# Patient Record
Sex: FEMALE | Race: WHITE | NOT HISPANIC OR LATINO | Employment: OTHER | URBAN - NONMETROPOLITAN AREA
[De-identification: names, ages, dates, MRNs, and addresses within clinical notes are randomized per-mention and may not be internally consistent; named-entity substitution may affect disease eponyms.]

---

## 2023-02-10 RX ORDER — ASPIRIN 325 MG
1 TABLET, DELAYED RELEASE (ENTERIC COATED) ORAL DAILY
COMMUNITY

## 2023-02-10 RX ORDER — IBANDRONATE SODIUM 150 MG/1
TABLET, FILM COATED ORAL
COMMUNITY
Start: 2022-06-21 | End: 2023-05-02 | Stop reason: SDUPTHER

## 2023-02-10 RX ORDER — MEMANTINE HYDROCHLORIDE 10 MG/1
1 TABLET ORAL 2 TIMES DAILY
COMMUNITY
Start: 2021-05-03 | End: 2023-09-13 | Stop reason: SDUPTHER

## 2023-02-10 RX ORDER — ASCORBIC ACID 500 MG
1 TABLET ORAL DAILY
COMMUNITY
Start: 2020-05-21

## 2023-02-10 RX ORDER — MUPIROCIN 20 MG/G
OINTMENT TOPICAL 2 TIMES DAILY
COMMUNITY
Start: 2022-08-26 | End: 2023-05-02 | Stop reason: ALTCHOICE

## 2023-02-18 PROBLEM — L97.912: Status: ACTIVE | Noted: 2023-02-18

## 2023-02-18 PROBLEM — S32.2XXA FX SACRUM/COCCYX-CLOSED (MULTI): Status: ACTIVE | Noted: 2023-02-18

## 2023-02-18 PROBLEM — M53.3 SACRAL PAIN: Status: ACTIVE | Noted: 2023-02-18

## 2023-02-18 PROBLEM — F01.50 VASCULAR DEMENTIA WITHOUT BEHAVIORAL DISTURBANCE (MULTI): Status: ACTIVE | Noted: 2023-02-18

## 2023-02-18 PROBLEM — M40.00 KYPHOSIS (ACQUIRED) (POSTURAL): Status: ACTIVE | Noted: 2023-02-18

## 2023-02-18 PROBLEM — S32.10XA FX SACRUM/COCCYX-CLOSED (MULTI): Status: ACTIVE | Noted: 2023-02-18

## 2023-02-18 PROBLEM — M85.851 OSTEOPENIA OF RIGHT HIP: Status: ACTIVE | Noted: 2023-02-18

## 2023-02-18 PROBLEM — N89.8 VAGINAL DRYNESS: Status: ACTIVE | Noted: 2023-02-18

## 2023-02-18 PROBLEM — M80.00XA AGE-RELATED OSTEOPOROSIS WITH CURRENT PATHOLOGICAL FRACTURE: Status: ACTIVE | Noted: 2023-02-18

## 2023-05-02 ENCOUNTER — OFFICE VISIT (OUTPATIENT)
Dept: PRIMARY CARE | Facility: CLINIC | Age: 83
End: 2023-05-02
Payer: MEDICARE

## 2023-05-02 VITALS
WEIGHT: 111 LBS | BODY MASS INDEX: 22.38 KG/M2 | HEIGHT: 59 IN | SYSTOLIC BLOOD PRESSURE: 126 MMHG | DIASTOLIC BLOOD PRESSURE: 64 MMHG | OXYGEN SATURATION: 98 % | HEART RATE: 82 BPM

## 2023-05-02 DIAGNOSIS — S32.2XXD CLOSED FRACTURE OF SACRUM AND COCCYX WITH ROUTINE HEALING, SUBSEQUENT ENCOUNTER: ICD-10-CM

## 2023-05-02 DIAGNOSIS — Z00.00 ROUTINE GENERAL MEDICAL EXAMINATION AT HEALTH CARE FACILITY: Primary | ICD-10-CM

## 2023-05-02 DIAGNOSIS — M80.00XD AGE-RELATED OSTEOPOROSIS WITH CURRENT PATHOLOGICAL FRACTURE WITH ROUTINE HEALING, SUBSEQUENT ENCOUNTER: ICD-10-CM

## 2023-05-02 DIAGNOSIS — S32.10XD CLOSED FRACTURE OF SACRUM AND COCCYX WITH ROUTINE HEALING, SUBSEQUENT ENCOUNTER: ICD-10-CM

## 2023-05-02 DIAGNOSIS — F01.50 VASCULAR DEMENTIA WITHOUT BEHAVIORAL DISTURBANCE (MULTI): ICD-10-CM

## 2023-05-02 PROBLEM — M53.3 SACRAL PAIN: Status: RESOLVED | Noted: 2023-02-18 | Resolved: 2023-05-02

## 2023-05-02 PROBLEM — L97.912: Status: RESOLVED | Noted: 2023-02-18 | Resolved: 2023-05-02

## 2023-05-02 PROBLEM — M85.851 OSTEOPENIA OF RIGHT HIP: Status: RESOLVED | Noted: 2023-02-18 | Resolved: 2023-05-02

## 2023-05-02 PROCEDURE — 1160F RVW MEDS BY RX/DR IN RCRD: CPT | Performed by: FAMILY MEDICINE

## 2023-05-02 PROCEDURE — 99214 OFFICE O/P EST MOD 30 MIN: CPT | Performed by: FAMILY MEDICINE

## 2023-05-02 PROCEDURE — 1170F FXNL STATUS ASSESSED: CPT | Performed by: FAMILY MEDICINE

## 2023-05-02 PROCEDURE — 1159F MED LIST DOCD IN RCRD: CPT | Performed by: FAMILY MEDICINE

## 2023-05-02 PROCEDURE — 1036F TOBACCO NON-USER: CPT | Performed by: FAMILY MEDICINE

## 2023-05-02 PROCEDURE — G0439 PPPS, SUBSEQ VISIT: HCPCS | Performed by: FAMILY MEDICINE

## 2023-05-02 RX ORDER — SALIVA STIMULANT COMB. NO.4
1 SPRAY, NON-AEROSOL (ML) MUCOUS MEMBRANE DAILY
COMMUNITY
End: 2024-05-15 | Stop reason: ALTCHOICE

## 2023-05-02 RX ORDER — IBANDRONATE SODIUM 150 MG/1
150 TABLET, FILM COATED ORAL
Qty: 3 TABLET | Refills: 3 | Status: SHIPPED | OUTPATIENT
Start: 2023-05-02 | End: 2024-05-15 | Stop reason: SDUPTHER

## 2023-05-02 ASSESSMENT — ACTIVITIES OF DAILY LIVING (ADL)
MANAGING_FINANCES: TOTAL CARE
GROCERY_SHOPPING: NEEDS ASSISTANCE
BATHING: INDEPENDENT
DRESSING: INDEPENDENT
TAKING_MEDICATION: NEEDS ASSISTANCE
DOING_HOUSEWORK: INDEPENDENT

## 2023-05-02 ASSESSMENT — PATIENT HEALTH QUESTIONNAIRE - PHQ9
SUM OF ALL RESPONSES TO PHQ9 QUESTIONS 1 AND 2: 0
1. LITTLE INTEREST OR PLEASURE IN DOING THINGS: NOT AT ALL
1. LITTLE INTEREST OR PLEASURE IN DOING THINGS: NOT AT ALL
2. FEELING DOWN, DEPRESSED OR HOPELESS: NOT AT ALL
2. FEELING DOWN, DEPRESSED OR HOPELESS: NOT AT ALL
SUM OF ALL RESPONSES TO PHQ9 QUESTIONS 1 AND 2: 0

## 2023-05-02 NOTE — PATIENT INSTRUCTIONS

## 2023-05-02 NOTE — PROGRESS NOTES
"Subjective   Reason for Visit: Dinah Parmar is an 82 y.o. female here for a Medicare Wellness visit.     Past Medical, Surgical, and Family History reviewed and updated in chart.    Reviewed all medications by prescribing practitioner or clinical pharmacist (such as prescriptions, OTCs, herbal therapies and supplements) and documented in the medical record.    HPI  She had a lot of pain in right groin. When walking or trying to get up. Walking. CT shows Sacral fracture as well as pubic ramus and L5 transverse process. This was 12  months ago. Has had  no falls since then. Had [facility] for PT. Tylenol for pain. She saw Dr Grossman and observed. . She is Active and pain is less      Memory seems to be about the same as it has been. 6 CIT score of 23 Cannot accurately recall the associations of nieces and nephews and now . Cannot get to places. Will not recognize places she been. Nees reminded of appointments. Re-asks things she has been told. No wandering. Does cooking still but only routine things.  Now burner issues. But cannot recall recipes or follow them. Memantine at 10 bid.  feels she is doing well at times.      Osteoporosis. Noted on Xray. DEXA Elizabeth 15, 2022. Could not afford the Prolia so on Boniva. T -3.9 at hip.      Weight is stable at BMI 22    DPA and LW  and son  Pneumovax UTD          Patient Care Team:  Abdiel Gutierrez MD as PCP - General  Abdiel Gutierrez MD as PCP - O Medicare Advantage PCP     Review of Systems    Objective   Vitals:  /64 (BP Location: Left arm, Patient Position: Sitting)   Pulse 82   Ht 1.499 m (4' 11\")   Wt 50.3 kg (111 lb)   SpO2 98%   BMI 22.42 kg/m²       Physical Exam  Vitals reviewed.   Constitutional:       General: She is not in acute distress.     Appearance: Normal appearance.      Comments:  supplies most of history and corrects her history. She is able to tell current condition.    HENT:      Head: Normocephalic.      Right Ear: Tympanic " membrane, ear canal and external ear normal. No decreased hearing noted.      Left Ear: Tympanic membrane, ear canal and external ear normal. No decreased hearing noted.      Nose: Nose normal.      Mouth/Throat:      Pharynx: Oropharynx is clear.   Eyes:      Extraocular Movements: Extraocular movements intact.      Conjunctiva/sclera: Conjunctivae normal.      Pupils: Pupils are equal, round, and reactive to light.   Neck:      Vascular: No carotid bruit.   Cardiovascular:      Rate and Rhythm: Normal rate and regular rhythm.      Pulses: Normal pulses.      Heart sounds: Normal heart sounds. No murmur heard.  Pulmonary:      Effort: Pulmonary effort is normal. No respiratory distress.      Breath sounds: Normal breath sounds.   Abdominal:      General: Abdomen is flat. Bowel sounds are normal. There is no distension.      Palpations: Abdomen is soft. There is no mass.      Tenderness: There is no abdominal tenderness.   Musculoskeletal:      Cervical back: Normal range of motion and neck supple. No tenderness.   Lymphadenopathy:      Cervical: No cervical adenopathy.   Skin:     General: Skin is warm and dry.      Findings: No rash.   Neurological:      General: No focal deficit present.      Mental Status: She is alert.   Psychiatric:         Mood and Affect: Mood normal.         Thought Content: Thought content normal.         Judgment: Judgment normal.         Assessment/Plan   Problem List Items Addressed This Visit       Age-related osteoporosis with current pathological fracture    Fx sacrum/coccyx-closed (CMS/HCC)    Vascular dementia without behavioral disturbance (CMS/HCC)     Other Visit Diagnoses       Routine general medical examination at health care facility    -  Primary             Patient was identified as a fall risk. Risk prevention instructions provided. She fell a few months ago and broke pelvis. She got tangled up in clothes. She is more careful dressing now but has the memory deficit. Bu  this was over a year ago. No repeats.

## 2023-09-13 ENCOUNTER — TELEPHONE (OUTPATIENT)
Dept: PRIMARY CARE | Facility: CLINIC | Age: 83
End: 2023-09-13
Payer: MEDICARE

## 2023-09-13 DIAGNOSIS — F01.50 VASCULAR DEMENTIA WITHOUT BEHAVIORAL DISTURBANCE (MULTI): Primary | ICD-10-CM

## 2023-09-13 RX ORDER — MEMANTINE HYDROCHLORIDE 10 MG/1
10 TABLET ORAL 2 TIMES DAILY
Qty: 60 TABLET | Refills: 2 | Status: SHIPPED | OUTPATIENT
Start: 2023-09-13 | End: 2023-11-14

## 2023-09-21 ENCOUNTER — CLINICAL SUPPORT (OUTPATIENT)
Dept: PRIMARY CARE | Facility: CLINIC | Age: 83
End: 2023-09-21
Payer: MEDICARE

## 2023-09-21 DIAGNOSIS — Z23 IMMUNIZATION DUE: ICD-10-CM

## 2023-09-21 PROCEDURE — G0008 ADMIN INFLUENZA VIRUS VAC: HCPCS | Performed by: FAMILY MEDICINE

## 2023-09-21 PROCEDURE — 90662 IIV NO PRSV INCREASED AG IM: CPT | Performed by: FAMILY MEDICINE

## 2023-11-02 ENCOUNTER — APPOINTMENT (OUTPATIENT)
Dept: PRIMARY CARE | Facility: CLINIC | Age: 83
End: 2023-11-02
Payer: MEDICARE

## 2023-11-14 ENCOUNTER — LAB (OUTPATIENT)
Dept: LAB | Facility: LAB | Age: 83
End: 2023-11-14
Payer: MEDICARE

## 2023-11-14 ENCOUNTER — OFFICE VISIT (OUTPATIENT)
Dept: PRIMARY CARE | Facility: CLINIC | Age: 83
End: 2023-11-14
Payer: MEDICARE

## 2023-11-14 VITALS
SYSTOLIC BLOOD PRESSURE: 126 MMHG | BODY MASS INDEX: 21.81 KG/M2 | WEIGHT: 108 LBS | HEART RATE: 73 BPM | DIASTOLIC BLOOD PRESSURE: 74 MMHG | OXYGEN SATURATION: 99 %

## 2023-11-14 DIAGNOSIS — M80.00XD AGE-RELATED OSTEOPOROSIS WITH CURRENT PATHOLOGICAL FRACTURE WITH ROUTINE HEALING, SUBSEQUENT ENCOUNTER: Primary | ICD-10-CM

## 2023-11-14 DIAGNOSIS — M47.22 CERVICAL SPONDYLOSIS WITH RADICULOPATHY: ICD-10-CM

## 2023-11-14 DIAGNOSIS — M40.00 KYPHOSIS (ACQUIRED) (POSTURAL): ICD-10-CM

## 2023-11-14 DIAGNOSIS — F01.50 VASCULAR DEMENTIA WITHOUT BEHAVIORAL DISTURBANCE (MULTI): ICD-10-CM

## 2023-11-14 LAB
ALBUMIN SERPL BCP-MCNC: 4.5 G/DL (ref 3.4–5)
ALP SERPL-CCNC: 72 U/L (ref 33–136)
ALT SERPL W P-5'-P-CCNC: 13 U/L (ref 7–45)
ANION GAP SERPL CALC-SCNC: 9 MMOL/L (ref 10–20)
AST SERPL W P-5'-P-CCNC: 25 U/L (ref 9–39)
BILIRUB SERPL-MCNC: 0.9 MG/DL (ref 0–1.2)
BUN SERPL-MCNC: 15 MG/DL (ref 6–23)
CALCIUM SERPL-MCNC: 10.2 MG/DL (ref 8.6–10.3)
CHLORIDE SERPL-SCNC: 100 MMOL/L (ref 98–107)
CO2 SERPL-SCNC: 30 MMOL/L (ref 21–32)
CREAT SERPL-MCNC: 0.75 MG/DL (ref 0.5–1.05)
ERYTHROCYTE [DISTWIDTH] IN BLOOD BY AUTOMATED COUNT: 12.5 % (ref 11.5–14.5)
GFR SERPL CREATININE-BSD FRML MDRD: 78 ML/MIN/1.73M*2
GLUCOSE SERPL-MCNC: 83 MG/DL (ref 74–99)
HCT VFR BLD AUTO: 41.4 % (ref 36–46)
HGB BLD-MCNC: 13.3 G/DL (ref 12–16)
MCH RBC QN AUTO: 32 PG (ref 26–34)
MCHC RBC AUTO-ENTMCNC: 32.1 G/DL (ref 32–36)
MCV RBC AUTO: 100 FL (ref 80–100)
NRBC BLD-RTO: 0 /100 WBCS (ref 0–0)
PLATELET # BLD AUTO: 372 X10*3/UL (ref 150–450)
POTASSIUM SERPL-SCNC: 3.8 MMOL/L (ref 3.5–5.3)
PROT SERPL-MCNC: 7.4 G/DL (ref 6.4–8.2)
RBC # BLD AUTO: 4.16 X10*6/UL (ref 4–5.2)
SODIUM SERPL-SCNC: 135 MMOL/L (ref 136–145)
WBC # BLD AUTO: 8.8 X10*3/UL (ref 4.4–11.3)

## 2023-11-14 PROCEDURE — 1036F TOBACCO NON-USER: CPT | Performed by: FAMILY MEDICINE

## 2023-11-14 PROCEDURE — 80053 COMPREHEN METABOLIC PANEL: CPT

## 2023-11-14 PROCEDURE — 99214 OFFICE O/P EST MOD 30 MIN: CPT | Performed by: FAMILY MEDICINE

## 2023-11-14 PROCEDURE — 1159F MED LIST DOCD IN RCRD: CPT | Performed by: FAMILY MEDICINE

## 2023-11-14 PROCEDURE — 85027 COMPLETE CBC AUTOMATED: CPT

## 2023-11-14 PROCEDURE — 1160F RVW MEDS BY RX/DR IN RCRD: CPT | Performed by: FAMILY MEDICINE

## 2023-11-14 PROCEDURE — 36415 COLL VENOUS BLD VENIPUNCTURE: CPT

## 2023-11-14 RX ORDER — MEMANTINE HYDROCHLORIDE 28 MG/1
28 CAPSULE, EXTENDED RELEASE ORAL DAILY
Qty: 90 CAPSULE | Refills: 3 | Status: SHIPPED | OUTPATIENT
Start: 2023-11-14 | End: 2024-11-13

## 2023-11-14 NOTE — PROGRESS NOTES
Subjective   Patient ID: Dinah Parmar is a 83 y.o. female who presents for Med Management (Memory worse).    HPI   She had a lot of pain in right groin. When walking or trying to get up. Walking. CT shows Sacral fracture as well as pubic ramus and L5 transverse process. This was 12  months ago. Has had no falls since then. Had [facility] for PT. Tylenol for pain. She saw Dr Grossman and observed.  She is Active and pain is resolved but now shoulder pain more of an issue.      Memory seems to be worse.  Her  reports that she used [employer] for shortening. Also she insisted a little girl was in the house.  6 CIT score of 23 last time.  Did not recall that she was sleeping with .  Cannot accurately recall the associations of nieces and nephews and now . Cannot get to places. Will not recognize places she been. Needs reminded of appointments. Re-asks things she has been told. No wandering. No longer does cooking.   No burner issues. Cannot recall recipes or follow them. She was able to make coffee but now struggles.  Memantine at 10 bid. Will bump to 28 mg.   feels she is having less good times. Sleeping OK     Osteoporosis. Noted on Xray. DEXA Elizabeth 15, 2022. Could not afford the Prolia so on Boniva. T -3.9 at hip.      Weight is down 3 to 109 and  BMI 21.8     DPA and LW  and son  Pneumovax UTD     Review of Systems    Objective   /74 (BP Location: Left arm, Patient Position: Sitting)   Pulse 73   Wt 49 kg (108 lb)   SpO2 99%   BMI 21.81 kg/m²     Physical Exam  Vitals reviewed.   Constitutional:       Appearance: Normal appearance.   HENT:      Head: Normocephalic.   Eyes:      Extraocular Movements: Extraocular movements intact.      Conjunctiva/sclera: Conjunctivae normal.      Pupils: Pupils are equal, round, and reactive to light.   Neck:      Vascular: No carotid bruit.   Cardiovascular:      Rate and Rhythm: Normal rate and regular rhythm.      Heart sounds: Murmur (2/6 systolic)  heard.   Pulmonary:      Effort: Pulmonary effort is normal.      Breath sounds: Normal breath sounds.   Abdominal:      General: Abdomen is flat. Bowel sounds are normal.      Palpations: Abdomen is soft.   Musculoskeletal:      Cervical back: Normal range of motion and neck supple. Tenderness (left paracervical with spasm of left traps) present. No rigidity.   Lymphadenopathy:      Cervical: No cervical adenopathy.   Skin:     General: Skin is warm and dry.   Neurological:      Mental Status: She is alert and oriented to person, place, and time.   Psychiatric:         Mood and Affect: Mood normal.         Behavior: Behavior normal.         Thought Content: Thought content normal.         Judgment: Judgment normal.         Assessment/Plan   Problem List Items Addressed This Visit             ICD-10-CM    Age-related osteoporosis with current pathological fracture - Primary M80.00XA    Kyphosis (acquired) (postural) M40.00    Vascular dementia without behavioral disturbance (CMS/HCC) F01.50    Relevant Medications    memantine (Namenda) 28 mg capsule,sprinkle,ER 24hr    Other Relevant Orders    CBC    Comprehensive Metabolic Panel    Follow Up In Primary Care - Established     Other Visit Diagnoses         Codes    Cervical spondylosis with radiculopathy     M47.22

## 2023-11-14 NOTE — PATIENT INSTRUCTIONS
Try heating pad on neck for pain. Can try Jimbo Emu or something like that for the neck pain that you are feeling in the shoulder.

## 2023-11-16 ENCOUNTER — APPOINTMENT (OUTPATIENT)
Dept: PRIMARY CARE | Facility: CLINIC | Age: 83
End: 2023-11-16
Payer: MEDICARE

## 2024-05-15 ENCOUNTER — OFFICE VISIT (OUTPATIENT)
Dept: PRIMARY CARE | Facility: CLINIC | Age: 84
End: 2024-05-15
Payer: MEDICARE

## 2024-05-15 VITALS
SYSTOLIC BLOOD PRESSURE: 122 MMHG | OXYGEN SATURATION: 96 % | HEIGHT: 59 IN | BODY MASS INDEX: 21.97 KG/M2 | HEART RATE: 78 BPM | DIASTOLIC BLOOD PRESSURE: 60 MMHG | WEIGHT: 109 LBS

## 2024-05-15 DIAGNOSIS — N39.41 URGE INCONTINENCE OF URINE: ICD-10-CM

## 2024-05-15 DIAGNOSIS — Z00.00 ROUTINE GENERAL MEDICAL EXAMINATION AT HEALTH CARE FACILITY: Primary | ICD-10-CM

## 2024-05-15 DIAGNOSIS — M80.00XD AGE-RELATED OSTEOPOROSIS WITH CURRENT PATHOLOGICAL FRACTURE WITH ROUTINE HEALING, SUBSEQUENT ENCOUNTER: ICD-10-CM

## 2024-05-15 DIAGNOSIS — S61.411A SKIN TEAR OF RIGHT HAND WITHOUT COMPLICATION, INITIAL ENCOUNTER: ICD-10-CM

## 2024-05-15 DIAGNOSIS — F01.50 VASCULAR DEMENTIA WITHOUT BEHAVIORAL DISTURBANCE (MULTI): ICD-10-CM

## 2024-05-15 PROBLEM — M80.00XA AGE-RELATED OSTEOPOROSIS WITH CURRENT PATHOLOGICAL FRACTURE: Status: RESOLVED | Noted: 2023-02-18 | Resolved: 2024-05-15

## 2024-05-15 PROBLEM — S32.2XXA FX SACRUM/COCCYX-CLOSED (MULTI): Status: RESOLVED | Noted: 2023-02-18 | Resolved: 2024-05-15

## 2024-05-15 PROBLEM — M81.0 AGE-RELATED OSTEOPOROSIS WITHOUT CURRENT PATHOLOGICAL FRACTURE: Status: ACTIVE | Noted: 2024-05-15

## 2024-05-15 PROBLEM — S32.10XA FX SACRUM/COCCYX-CLOSED (MULTI): Status: RESOLVED | Noted: 2023-02-18 | Resolved: 2024-05-15

## 2024-05-15 PROCEDURE — 1123F ACP DISCUSS/DSCN MKR DOCD: CPT | Performed by: FAMILY MEDICINE

## 2024-05-15 PROCEDURE — 1170F FXNL STATUS ASSESSED: CPT | Performed by: FAMILY MEDICINE

## 2024-05-15 PROCEDURE — 1160F RVW MEDS BY RX/DR IN RCRD: CPT | Performed by: FAMILY MEDICINE

## 2024-05-15 PROCEDURE — G0439 PPPS, SUBSEQ VISIT: HCPCS | Performed by: FAMILY MEDICINE

## 2024-05-15 PROCEDURE — 1036F TOBACCO NON-USER: CPT | Performed by: FAMILY MEDICINE

## 2024-05-15 PROCEDURE — 1157F ADVNC CARE PLAN IN RCRD: CPT | Performed by: FAMILY MEDICINE

## 2024-05-15 PROCEDURE — 1159F MED LIST DOCD IN RCRD: CPT | Performed by: FAMILY MEDICINE

## 2024-05-15 PROCEDURE — 1158F ADVNC CARE PLAN TLK DOCD: CPT | Performed by: FAMILY MEDICINE

## 2024-05-15 RX ORDER — IBANDRONATE SODIUM 150 MG/1
150 TABLET, FILM COATED ORAL
Qty: 3 TABLET | Refills: 3 | Status: SHIPPED | OUTPATIENT
Start: 2024-05-15 | End: 2025-05-15

## 2024-05-15 RX ORDER — IBUPROFEN 200 MG
400 TABLET ORAL EVERY 6 HOURS PRN
COMMUNITY

## 2024-05-15 ASSESSMENT — ACTIVITIES OF DAILY LIVING (ADL)
BATHING: INDEPENDENT
MANAGING_FINANCES: TOTAL CARE
GROCERY_SHOPPING: TOTAL CARE
TAKING_MEDICATION: TOTAL CARE
DOING_HOUSEWORK: NEEDS ASSISTANCE
DRESSING: INDEPENDENT

## 2024-05-15 ASSESSMENT — ENCOUNTER SYMPTOMS
OCCASIONAL FEELINGS OF UNSTEADINESS: 0
LOSS OF SENSATION IN FEET: 0
DEPRESSION: 0

## 2024-05-15 ASSESSMENT — PATIENT HEALTH QUESTIONNAIRE - PHQ9
1. LITTLE INTEREST OR PLEASURE IN DOING THINGS: NOT AT ALL
2. FEELING DOWN, DEPRESSED OR HOPELESS: NOT AT ALL
2. FEELING DOWN, DEPRESSED OR HOPELESS: NOT AT ALL
1. LITTLE INTEREST OR PLEASURE IN DOING THINGS: NOT AT ALL
SUM OF ALL RESPONSES TO PHQ9 QUESTIONS 1 AND 2: 0
SUM OF ALL RESPONSES TO PHQ9 QUESTIONS 1 AND 2: 0

## 2024-05-15 NOTE — PROGRESS NOTES
Subjective   Reason for Visit: Dinah Parmar is an 83 y.o. female here for a Medicare Wellness visit.     Past Medical, Surgical, and Family History reviewed and updated in chart.    Reviewed all medications by prescribing practitioner or clinical pharmacist (such as prescriptions, OTCs, herbal therapies and supplements) and documented in the medical record.    HPI  She had a lot of pain in right groin. When walking or trying to get up. Walking. CT shows Sacral fracture as well as pubic ramus and L5 transverse process. This was 12  months ago. Has had no falls since then. Had [facility] for PT. Tylenol for pain. She saw Dr Grossman and observed.  She is Active and pain is resolved but now shoulder pain more of an issue. Ibuprofen takes care of that with normal CMP in November.      Memory continues to get worse.  She is still seeing animals and people in the house. 6 CIT score of 25 as she has difficulty following instructions, remembering what she is doing with sequences. .  Did not recall that she was sleeping with .  Cannot accurately recall the associations of nieces and nephews and now . Cannot get to places. Will not recognize places she has been.  Re-asks things she has been told. No wandering. No longer does cooking.   Now with burner issues so needs to take handles off.  Cannot recall recipes or follow them. She was able to make coffee but now struggles.  Memantine to ER 28 with little help.   feels she is having less good times. Sleeping OK.  Agitation not an issue. Not ready to consider memory care. Unit.  Needs to go with the things she is seeing.       Osteoporosis. Noted on Xray. DEXA Elizabeth 15, 2022. Could not afford the Prolia so on Boniva. T -3.9 at hip. Will forgo the DEXA this time.     Urge incontinence - continent most of the time but when she gets urge does not make it often. So wearing Depends       Weight is stable at 109 again.  BMI 21.8     DPA and LW No but would be  and  "son  Pneumovax UTD     Patient Care Team:  Abdiel Gutierrez MD as PCP - General  Abdiel Gutierrez MD as PCP - MMO Medicare Advantage PCP     Review of Systems    Objective   Vitals:  /60 (BP Location: Left arm, Patient Position: Sitting)   Pulse 78   Ht 1.505 m (4' 11.25\")   Wt 49.4 kg (109 lb)   SpO2 96%   BMI 21.83 kg/m²       Physical Exam  Vitals reviewed.   Constitutional:       General: She is not in acute distress.     Appearance: Normal appearance.      Comments: She repeats things. Denies issues that her  brings up.    HENT:      Head: Normocephalic.      Right Ear: Tympanic membrane, ear canal and external ear normal.      Left Ear: Tympanic membrane, ear canal and external ear normal.      Nose: Nose normal.      Mouth/Throat:      Mouth: Mucous membranes are moist.      Pharynx: Oropharynx is clear.   Eyes:      Extraocular Movements: Extraocular movements intact.      Conjunctiva/sclera: Conjunctivae normal.      Pupils: Pupils are equal, round, and reactive to light.   Neck:      Vascular: No carotid bruit.   Cardiovascular:      Rate and Rhythm: Normal rate and regular rhythm.      Pulses: Normal pulses.      Heart sounds: Normal heart sounds. No murmur heard.  Pulmonary:      Effort: Pulmonary effort is normal. No respiratory distress.      Breath sounds: Normal breath sounds.   Abdominal:      General: Abdomen is flat. Bowel sounds are normal. There is no distension.      Palpations: Abdomen is soft. There is no mass.      Tenderness: There is no abdominal tenderness.   Musculoskeletal:      Cervical back: Normal range of motion and neck supple. No tenderness.   Lymphadenopathy:      Cervical: No cervical adenopathy.   Skin:     General: Skin is warm and dry.      Findings: No rash.      Comments: Healing skin tear on palm of hand.    Neurological:      General: No focal deficit present.      Mental Status: She is alert. She is disoriented.   Psychiatric:         Mood and Affect: " Mood normal.         Thought Content: Thought content normal.      Comments: Good affect and no irritability         Assessment/Plan   Problem List Items Addressed This Visit       RESOLVED: Age-related osteoporosis with current pathological fracture    Relevant Medications    ibandronate (Boniva) 150 mg tablet    Urge incontinence of urine    Vascular dementia without behavioral disturbance (Multi)    Relevant Orders    CBC    Comprehensive Metabolic Panel    Follow Up In Primary Care - Established     Other Visit Diagnoses       Routine general medical examination at health care facility    -  Primary    Skin tear of right hand without complication, initial encounter

## 2024-08-17 ENCOUNTER — HOSPITAL ENCOUNTER (INPATIENT)
Facility: HOSPITAL | Age: 84
End: 2024-08-17
Attending: EMERGENCY MEDICINE | Admitting: INTERNAL MEDICINE
Payer: MEDICARE

## 2024-08-17 DIAGNOSIS — R53.1 GENERALIZED WEAKNESS: Primary | ICD-10-CM

## 2024-08-17 LAB
ALBUMIN SERPL BCP-MCNC: 4.1 G/DL (ref 3.4–5)
ALP SERPL-CCNC: 63 U/L (ref 33–136)
ALT SERPL W P-5'-P-CCNC: 8 U/L (ref 7–45)
ANION GAP SERPL CALC-SCNC: 11 MMOL/L (ref 10–20)
AST SERPL W P-5'-P-CCNC: 19 U/L (ref 9–39)
BASOPHILS # BLD AUTO: 0.04 X10*3/UL (ref 0–0.1)
BASOPHILS NFR BLD AUTO: 0.5 %
BILIRUB SERPL-MCNC: 1 MG/DL (ref 0–1.2)
BUN SERPL-MCNC: 16 MG/DL (ref 6–23)
CALCIUM SERPL-MCNC: 9.3 MG/DL (ref 8.6–10.3)
CHLORIDE SERPL-SCNC: 103 MMOL/L (ref 98–107)
CO2 SERPL-SCNC: 24 MMOL/L (ref 21–32)
CREAT SERPL-MCNC: 0.76 MG/DL (ref 0.5–1.05)
EGFRCR SERPLBLD CKD-EPI 2021: 77 ML/MIN/1.73M*2
EOSINOPHIL # BLD AUTO: 0.1 X10*3/UL (ref 0–0.4)
EOSINOPHIL NFR BLD AUTO: 1.3 %
ERYTHROCYTE [DISTWIDTH] IN BLOOD BY AUTOMATED COUNT: 12.2 % (ref 11.5–14.5)
GLUCOSE SERPL-MCNC: 95 MG/DL (ref 74–99)
HCT VFR BLD AUTO: 36.4 % (ref 36–46)
HGB BLD-MCNC: 11.9 G/DL (ref 12–16)
IMM GRANULOCYTES # BLD AUTO: 0.02 X10*3/UL (ref 0–0.5)
IMM GRANULOCYTES NFR BLD AUTO: 0.3 % (ref 0–0.9)
LACTATE SERPL-SCNC: 0.9 MMOL/L (ref 0.4–2)
LYMPHOCYTES # BLD AUTO: 0.95 X10*3/UL (ref 0.8–3)
LYMPHOCYTES NFR BLD AUTO: 11.9 %
MAGNESIUM SERPL-MCNC: 1.92 MG/DL (ref 1.6–2.4)
MCH RBC QN AUTO: 31.4 PG (ref 26–34)
MCHC RBC AUTO-ENTMCNC: 32.7 G/DL (ref 32–36)
MCV RBC AUTO: 96 FL (ref 80–100)
MONOCYTES # BLD AUTO: 0.92 X10*3/UL (ref 0.05–0.8)
MONOCYTES NFR BLD AUTO: 11.5 %
NEUTROPHILS # BLD AUTO: 5.97 X10*3/UL (ref 1.6–5.5)
NEUTROPHILS NFR BLD AUTO: 74.5 %
NRBC BLD-RTO: 0 /100 WBCS (ref 0–0)
PLATELET # BLD AUTO: 291 X10*3/UL (ref 150–450)
POTASSIUM SERPL-SCNC: 3.5 MMOL/L (ref 3.5–5.3)
PROT SERPL-MCNC: 6.5 G/DL (ref 6.4–8.2)
RBC # BLD AUTO: 3.79 X10*6/UL (ref 4–5.2)
SODIUM SERPL-SCNC: 134 MMOL/L (ref 136–145)
WBC # BLD AUTO: 8 X10*3/UL (ref 4.4–11.3)

## 2024-08-17 PROCEDURE — 80053 COMPREHEN METABOLIC PANEL: CPT | Performed by: EMERGENCY MEDICINE

## 2024-08-17 PROCEDURE — 85025 COMPLETE CBC W/AUTO DIFF WBC: CPT | Performed by: EMERGENCY MEDICINE

## 2024-08-17 PROCEDURE — 83735 ASSAY OF MAGNESIUM: CPT | Performed by: EMERGENCY MEDICINE

## 2024-08-17 PROCEDURE — 84443 ASSAY THYROID STIM HORMONE: CPT | Performed by: INTERNAL MEDICINE

## 2024-08-17 PROCEDURE — 36415 COLL VENOUS BLD VENIPUNCTURE: CPT | Performed by: EMERGENCY MEDICINE

## 2024-08-17 PROCEDURE — 99285 EMERGENCY DEPT VISIT HI MDM: CPT

## 2024-08-17 PROCEDURE — 83605 ASSAY OF LACTIC ACID: CPT | Performed by: EMERGENCY MEDICINE

## 2024-08-18 VITALS
WEIGHT: 105.38 LBS | HEIGHT: 59 IN | SYSTOLIC BLOOD PRESSURE: 129 MMHG | RESPIRATION RATE: 16 BRPM | DIASTOLIC BLOOD PRESSURE: 59 MMHG | BODY MASS INDEX: 21.24 KG/M2 | HEART RATE: 71 BPM | TEMPERATURE: 97.2 F | OXYGEN SATURATION: 97 %

## 2024-08-18 PROBLEM — R53.1 WEAKNESS: Status: ACTIVE | Noted: 2024-08-18

## 2024-08-18 PROBLEM — R53.1 GENERALIZED WEAKNESS: Status: ACTIVE | Noted: 2024-08-18

## 2024-08-18 LAB
AMORPH CRY #/AREA UR COMP ASSIST: ABNORMAL /HPF
APPEARANCE UR: ABNORMAL
BACTERIA #/AREA URNS AUTO: ABNORMAL /HPF
BILIRUB UR STRIP.AUTO-MCNC: NEGATIVE MG/DL
COLOR UR: ABNORMAL
GLUCOSE UR STRIP.AUTO-MCNC: NORMAL MG/DL
HOLD SPECIMEN: NORMAL
KETONES UR STRIP.AUTO-MCNC: ABNORMAL MG/DL
LEUKOCYTE ESTERASE UR QL STRIP.AUTO: ABNORMAL
MUCOUS THREADS #/AREA URNS AUTO: ABNORMAL /LPF
NITRITE UR QL STRIP.AUTO: ABNORMAL
PH UR STRIP.AUTO: 6 [PH]
PROT UR STRIP.AUTO-MCNC: NEGATIVE MG/DL
RBC # UR STRIP.AUTO: NEGATIVE /UL
RBC #/AREA URNS AUTO: ABNORMAL /HPF
SP GR UR STRIP.AUTO: 1.02
SQUAMOUS #/AREA URNS AUTO: ABNORMAL /HPF
TSH SERPL-ACNC: 2.72 MIU/L (ref 0.44–3.98)
UROBILINOGEN UR STRIP.AUTO-MCNC: NORMAL MG/DL
WBC #/AREA URNS AUTO: ABNORMAL /HPF

## 2024-08-18 PROCEDURE — 2500000001 HC RX 250 WO HCPCS SELF ADMINISTERED DRUGS (ALT 637 FOR MEDICARE OP): Performed by: INTERNAL MEDICINE

## 2024-08-18 PROCEDURE — 99223 1ST HOSP IP/OBS HIGH 75: CPT | Performed by: INTERNAL MEDICINE

## 2024-08-18 PROCEDURE — 2500000004 HC RX 250 GENERAL PHARMACY W/ HCPCS (ALT 636 FOR OP/ED): Performed by: HOSPITALIST

## 2024-08-18 PROCEDURE — 1100000001 HC PRIVATE ROOM DAILY

## 2024-08-18 PROCEDURE — 2500000004 HC RX 250 GENERAL PHARMACY W/ HCPCS (ALT 636 FOR OP/ED): Performed by: INTERNAL MEDICINE

## 2024-08-18 PROCEDURE — 97161 PT EVAL LOW COMPLEX 20 MIN: CPT | Mod: GP

## 2024-08-18 PROCEDURE — 81001 URINALYSIS AUTO W/SCOPE: CPT | Performed by: EMERGENCY MEDICINE

## 2024-08-18 PROCEDURE — G0378 HOSPITAL OBSERVATION PER HR: HCPCS

## 2024-08-18 PROCEDURE — 87086 URINE CULTURE/COLONY COUNT: CPT | Mod: SAMLAB | Performed by: EMERGENCY MEDICINE

## 2024-08-18 RX ORDER — MAGNESIUM HYDROXIDE 2400 MG/10ML
10 SUSPENSION ORAL DAILY PRN
Status: DISCONTINUED | OUTPATIENT
Start: 2024-08-18 | End: 2024-08-23 | Stop reason: HOSPADM

## 2024-08-18 RX ORDER — ONDANSETRON 4 MG/1
4 TABLET, ORALLY DISINTEGRATING ORAL EVERY 8 HOURS PRN
Status: DISCONTINUED | OUTPATIENT
Start: 2024-08-18 | End: 2024-08-23 | Stop reason: HOSPADM

## 2024-08-18 RX ORDER — CEFTRIAXONE 1 G/50ML
1 INJECTION, SOLUTION INTRAVENOUS EVERY 24 HOURS
Status: DISCONTINUED | OUTPATIENT
Start: 2024-08-18 | End: 2024-08-20

## 2024-08-18 RX ORDER — ACETAMINOPHEN 650 MG/1
650 SUPPOSITORY RECTAL EVERY 4 HOURS PRN
Status: DISCONTINUED | OUTPATIENT
Start: 2024-08-18 | End: 2024-08-23 | Stop reason: HOSPADM

## 2024-08-18 RX ORDER — ACETAMINOPHEN 325 MG/1
650 TABLET ORAL EVERY 4 HOURS PRN
Status: DISCONTINUED | OUTPATIENT
Start: 2024-08-18 | End: 2024-08-23 | Stop reason: HOSPADM

## 2024-08-18 RX ORDER — ACETAMINOPHEN 160 MG/5ML
650 SOLUTION ORAL EVERY 4 HOURS PRN
Status: DISCONTINUED | OUTPATIENT
Start: 2024-08-18 | End: 2024-08-23 | Stop reason: HOSPADM

## 2024-08-18 RX ORDER — ONDANSETRON HYDROCHLORIDE 2 MG/ML
4 INJECTION, SOLUTION INTRAVENOUS EVERY 8 HOURS PRN
Status: DISCONTINUED | OUTPATIENT
Start: 2024-08-18 | End: 2024-08-23 | Stop reason: HOSPADM

## 2024-08-18 RX ORDER — HALOPERIDOL 5 MG/ML
5 INJECTION INTRAMUSCULAR EVERY 6 HOURS PRN
Status: DISCONTINUED | OUTPATIENT
Start: 2024-08-18 | End: 2024-08-23 | Stop reason: HOSPADM

## 2024-08-18 RX ORDER — MEMANTINE HYDROCHLORIDE 10 MG/1
10 TABLET ORAL 2 TIMES DAILY
Status: DISCONTINUED | OUTPATIENT
Start: 2024-08-18 | End: 2024-08-23 | Stop reason: HOSPADM

## 2024-08-18 RX ORDER — LORAZEPAM 2 MG/ML
0.5 INJECTION INTRAMUSCULAR ONCE
Status: DISCONTINUED | OUTPATIENT
Start: 2024-08-18 | End: 2024-08-18

## 2024-08-18 RX ADMIN — MEMANTINE 10 MG: 10 TABLET ORAL at 08:05

## 2024-08-18 RX ADMIN — CEFTRIAXONE SODIUM 1 G: 1 INJECTION, SOLUTION INTRAVENOUS at 08:05

## 2024-08-18 RX ADMIN — HALOPERIDOL LACTATE 5 MG: 5 INJECTION, SOLUTION INTRAMUSCULAR at 21:28

## 2024-08-18 SDOH — SOCIAL STABILITY: SOCIAL INSECURITY: DO YOU FEEL UNSAFE GOING BACK TO THE PLACE WHERE YOU ARE LIVING?: NO

## 2024-08-18 SDOH — SOCIAL STABILITY: SOCIAL INSECURITY: HAS ANYONE EVER THREATENED TO HURT YOUR FAMILY OR YOUR PETS?: NO

## 2024-08-18 SDOH — SOCIAL STABILITY: SOCIAL INSECURITY: ARE THERE ANY APPARENT SIGNS OF INJURIES/BEHAVIORS THAT COULD BE RELATED TO ABUSE/NEGLECT?: NO

## 2024-08-18 SDOH — SOCIAL STABILITY: SOCIAL INSECURITY: WERE YOU ABLE TO COMPLETE ALL THE BEHAVIORAL HEALTH SCREENINGS?: YES

## 2024-08-18 SDOH — SOCIAL STABILITY: SOCIAL INSECURITY: ABUSE: ADULT

## 2024-08-18 SDOH — SOCIAL STABILITY: SOCIAL INSECURITY: ARE YOU OR HAVE YOU BEEN THREATENED OR ABUSED PHYSICALLY, EMOTIONALLY, OR SEXUALLY BY ANYONE?: NO

## 2024-08-18 SDOH — SOCIAL STABILITY: SOCIAL INSECURITY: HAVE YOU HAD THOUGHTS OF HARMING ANYONE ELSE?: NO

## 2024-08-18 SDOH — SOCIAL STABILITY: SOCIAL INSECURITY: HAVE YOU HAD ANY THOUGHTS OF HARMING ANYONE ELSE?: NO

## 2024-08-18 SDOH — SOCIAL STABILITY: SOCIAL INSECURITY: DOES ANYONE TRY TO KEEP YOU FROM HAVING/CONTACTING OTHER FRIENDS OR DOING THINGS OUTSIDE YOUR HOME?: NO

## 2024-08-18 SDOH — SOCIAL STABILITY: SOCIAL INSECURITY: DO YOU FEEL ANYONE HAS EXPLOITED OR TAKEN ADVANTAGE OF YOU FINANCIALLY OR OF YOUR PERSONAL PROPERTY?: NO

## 2024-08-18 ASSESSMENT — ACTIVITIES OF DAILY LIVING (ADL)
LACK_OF_TRANSPORTATION: PATIENT DECLINED
WALKS IN HOME: INDEPENDENT
GROOMING: INDEPENDENT
PATIENT'S MEMORY ADEQUATE TO SAFELY COMPLETE DAILY ACTIVITIES?: NO
FEEDING YOURSELF: INDEPENDENT
JUDGMENT_ADEQUATE_SAFELY_COMPLETE_DAILY_ACTIVITIES: NO
ADEQUATE_TO_COMPLETE_ADL: YES
HEARING - LEFT EAR: FUNCTIONAL
DRESSING YOURSELF: INDEPENDENT
BATHING: INDEPENDENT
TOILETING: INDEPENDENT
HEARING - RIGHT EAR: FUNCTIONAL

## 2024-08-18 ASSESSMENT — COGNITIVE AND FUNCTIONAL STATUS - GENERAL
WALKING IN HOSPITAL ROOM: A LITTLE
MOBILITY SCORE: 19
MOBILITY SCORE: 18
TURNING FROM BACK TO SIDE WHILE IN FLAT BAD: A LITTLE
MOBILITY SCORE: 22
WALKING IN HOSPITAL ROOM: A LITTLE
MOBILITY SCORE: 18
DRESSING REGULAR LOWER BODY CLOTHING: A LITTLE
DAILY ACTIVITIY SCORE: 20
DRESSING REGULAR UPPER BODY CLOTHING: A LITTLE
HELP NEEDED FOR BATHING: A LITTLE
WALKING IN HOSPITAL ROOM: A LITTLE
TURNING FROM BACK TO SIDE WHILE IN FLAT BAD: A LITTLE
STANDING UP FROM CHAIR USING ARMS: A LITTLE
STANDING UP FROM CHAIR USING ARMS: A LITTLE
HELP NEEDED FOR BATHING: A LITTLE
CLIMB 3 TO 5 STEPS WITH RAILING: A LITTLE
DRESSING REGULAR UPPER BODY CLOTHING: A LITTLE
CLIMB 3 TO 5 STEPS WITH RAILING: A LOT
TOILETING: A LITTLE
MOVING FROM LYING ON BACK TO SITTING ON SIDE OF FLAT BED WITH BEDRAILS: A LITTLE
MOVING TO AND FROM BED TO CHAIR: A LITTLE
STANDING UP FROM CHAIR USING ARMS: A LITTLE
MOVING TO AND FROM BED TO CHAIR: A LITTLE
PATIENT BASELINE BEDBOUND: NO
STANDING UP FROM CHAIR USING ARMS: A LITTLE
MOVING TO AND FROM BED TO CHAIR: A LITTLE
DAILY ACTIVITIY SCORE: 24
DAILY ACTIVITIY SCORE: 20
CLIMB 3 TO 5 STEPS WITH RAILING: A LITTLE
DRESSING REGULAR LOWER BODY CLOTHING: A LITTLE
TOILETING: A LITTLE
MOVING FROM LYING ON BACK TO SITTING ON SIDE OF FLAT BED WITH BEDRAILS: A LITTLE
WALKING IN HOSPITAL ROOM: A LITTLE

## 2024-08-18 ASSESSMENT — PAIN - FUNCTIONAL ASSESSMENT
PAIN_FUNCTIONAL_ASSESSMENT: 0-10

## 2024-08-18 ASSESSMENT — PAIN SCALES - GENERAL
PAINLEVEL_OUTOF10: 0 - NO PAIN

## 2024-08-18 ASSESSMENT — LIFESTYLE VARIABLES
HOW OFTEN DO YOU HAVE 6 OR MORE DRINKS ON ONE OCCASION: NEVER
SKIP TO QUESTIONS 9-10: 1
HOW MANY STANDARD DRINKS CONTAINING ALCOHOL DO YOU HAVE ON A TYPICAL DAY: PATIENT DOES NOT DRINK
HOW OFTEN DO YOU HAVE A DRINK CONTAINING ALCOHOL: NEVER
SUBSTANCE_ABUSE_PAST_12_MONTHS: NO
AUDIT-C TOTAL SCORE: 0
AUDIT-C TOTAL SCORE: 0
PRESCIPTION_ABUSE_PAST_12_MONTHS: NO

## 2024-08-18 ASSESSMENT — PATIENT HEALTH QUESTIONNAIRE - PHQ9
2. FEELING DOWN, DEPRESSED OR HOPELESS: NOT AT ALL
SUM OF ALL RESPONSES TO PHQ9 QUESTIONS 1 & 2: 0
1. LITTLE INTEREST OR PLEASURE IN DOING THINGS: NOT AT ALL

## 2024-08-18 ASSESSMENT — COLUMBIA-SUICIDE SEVERITY RATING SCALE - C-SSRS
6. HAVE YOU EVER DONE ANYTHING, STARTED TO DO ANYTHING, OR PREPARED TO DO ANYTHING TO END YOUR LIFE?: NO
2. HAVE YOU ACTUALLY HAD ANY THOUGHTS OF KILLING YOURSELF?: NO
1. IN THE PAST MONTH, HAVE YOU WISHED YOU WERE DEAD OR WISHED YOU COULD GO TO SLEEP AND NOT WAKE UP?: NO

## 2024-08-18 NOTE — ED PROVIDER NOTES
HPI   Chief Complaint   Patient presents with    Weakness, Gen     Brought by EMS for reported b/l leg weakness that began tonight. EMS states that both patient and  are at home without assistance and unable to care for themselves at present.        83-year-old female presents with generalized weakness.   states that she laid down on the floor for several hours and he could not get her up.  He states she is progressively gotten weaker with her underlying senile dementia.  Patient has really no complaints.   states he is unable to take care of her.  Patient is somewhat agitated at times and was given Ativan 0.5 mg IV.  Patient will be admitted for nursing home placement.      History provided by:  Patient and spouse          Patient History   Past Medical History:   Diagnosis Date    Body mass index (BMI) 23.0-23.9, adult 12/21/2020    BMI 23.0-23.9, adult    Contusion of eyeball and orbital tissues, left eye, initial encounter 02/18/2021    Ecchymosis of left eye, initial encounter    Elevated blood-pressure reading, without diagnosis of hypertension 05/21/2020    Elevated blood pressure reading    Other insomnia not due to a substance or known physiological condition 05/22/2020    Situational insomnia    Personal history of other specified conditions 02/18/2021    History of memory loss    Personal history of urinary (tract) infections 05/21/2020    History of UTI     Past Surgical History:   Procedure Laterality Date    OTHER SURGICAL HISTORY  05/21/2020    Hysterectomy     Family History   Problem Relation Name Age of Onset    Cancer Mother       Social History     Tobacco Use    Smoking status: Never    Smokeless tobacco: Never   Vaping Use    Vaping status: Never Used   Substance Use Topics    Alcohol use: Never    Drug use: Never       Physical Exam   ED Triage Vitals [08/17/24 2254]   Temperature Heart Rate Respirations BP   36.4 °C (97.6 °F) 72 16 154/78      Pulse Ox Temp src Heart Rate  Source Patient Position   96 % -- -- --      BP Location FiO2 (%)     -- --       Physical Exam  Vitals and nursing note reviewed.   Constitutional:       General: She is not in acute distress.     Appearance: Normal appearance. She is well-developed.   HENT:      Head: Normocephalic and atraumatic.   Eyes:      Conjunctiva/sclera: Conjunctivae normal.   Cardiovascular:      Rate and Rhythm: Normal rate and regular rhythm.      Heart sounds: No murmur heard.  Pulmonary:      Effort: Pulmonary effort is normal. No respiratory distress.      Breath sounds: Normal breath sounds.   Abdominal:      Palpations: Abdomen is soft.      Tenderness: There is no abdominal tenderness.   Musculoskeletal:         General: No swelling.      Cervical back: Neck supple.   Skin:     General: Skin is warm and dry.      Capillary Refill: Capillary refill takes less than 2 seconds.   Neurological:      Mental Status: She is alert. Mental status is at baseline.      Comments: Pleasantly confused at times.   Psychiatric:         Mood and Affect: Mood normal.         ED Course & MDM   Diagnoses as of 08/18/24 0156   Generalized weakness                 No data recorded     [place] Coma Scale Score: 15 (08/17/24 2285 : Santo Maldonado RN)                           Medical Decision Making      Procedure  Procedures     Dougie Camarena, DO  08/18/24 0157

## 2024-08-18 NOTE — PROGRESS NOTES
This is an 83 years old female patient admitted because of probable UTI and metabolic encephalopathy in the setting of history of vascular dementia.  This morning, patient was resting on the recliner next to her .  She is aware only to herself, disoriented to time and place.  She denies any pain.  She denies any complaints.  Her vitals are stable, afebrile.  She is on room air.  She is on IV Rocephin.  Routine blood work was unremarkable.  Urine culture is pending.    Plan: Continue IV Rocephin, follow urine cultures, PT OT evaluation and treatment, patient will likely need placement to skilled nursing facility.    I spoke with the patient's  who is sitting next to her and he stated that he is not able to take care of her anymore at home and he thinks that she will need placement.

## 2024-08-18 NOTE — H&P
History Of Present Illness  Dinah Parmar is a 83 y.o. female  who presented to the emergency room for lower extremities weakness and generalized fatigue followed by a fall after which patient was not able to stand up by herself nor the spouse was able to get her up.  On presentation, vital signs grossly within normal limits.  Blood workup also came back grossly within normal limits except for a sodium of 134.  Urinalysis came back positive for nitrite and leukocyte esterase.  Patient was admitted to the medical service for further investigation and management.   Upon encounter, patient is awake and alert but oriented only to self.  Totally confused.  She is even agitated and restless and trying to get out of the bed.  No family at bedside.  Her  was actually hospitalized at the same time.  I was also told that her son lives in [place].  And contact was made with him.  Patient is still continent.  She did request to go to the restroom.    ROS  10 systems were reviewed and were negative except for those noted in the history of present illness.    Past Medical History  Past Medical History:   Diagnosis Date    Body mass index (BMI) 23.0-23.9, adult 12/21/2020    BMI 23.0-23.9, adult    Contusion of eyeball and orbital tissues, left eye, initial encounter 02/18/2021    Ecchymosis of left eye, initial encounter    Elevated blood-pressure reading, without diagnosis of hypertension 05/21/2020    Elevated blood pressure reading    Other insomnia not due to a substance or known physiological condition 05/22/2020    Situational insomnia    Personal history of other specified conditions 02/18/2021    History of memory loss    Personal history of urinary (tract) infections 05/21/2020    History of UTI     Pertinent medical history also documented in my below narrative    Surgical History  Past Surgical History:   Procedure Laterality Date    OTHER SURGICAL HISTORY  05/21/2020    Hysterectomy      Pertinent surgical history  "also documented in my below narrative    Social History  She reports that she has never smoked. She has never used smokeless tobacco. She reports that she does not drink alcohol and does not use drugs.    Family History  Family History   Problem Relation Name Age of Onset    Cancer Mother          Allergies  Donepezil    Medications Prior to Admission   Medication Sig Dispense Refill Last Dose    ascorbic acid (Vitamin C) 500 mg tablet Take 1 tablet (500 mg) by mouth once daily.       cholecalciferol (Vitamin D-3) 1,250 mcg (50,000 unit) capsule Take 1 capsule (50,000 Units) by mouth once daily.       diphenhydrAMINE-acetaminophen (Tylenol PM)  mg per tablet Take 2 tablets by mouth as needed at bedtime.       ibandronate (Boniva) 150 mg tablet Take 1 tablet (150 mg) by mouth every 30 (thirty) days. 3 tablet 3     ibuprofen 200 mg tablet Take 2 tablets (400 mg) by mouth every 6 hours if needed for mild pain (1 - 3).       memantine (Namenda) 28 mg capsule,sprinkle,ER 24hr Take 1 capsule (28 mg) by mouth once daily. 90 capsule 3     RED BEET ROOT-SOUR CHERRY EXT ORAL Take 1 tablet by mouth once daily.           Last Recorded Vitals  Blood pressure 151/60, pulse 72, temperature 36.2 °C (97.2 °F), temperature source Tympanic, resp. rate 20, height 1.505 m (4' 11.25\"), weight 47.8 kg (105 lb 6.1 oz), SpO2 98%.    Physical Exam  Constitutional:       General: She is not in acute distress.     Appearance: She is ill-appearing.      Comments: Awake.  Oriented only to self.  Not engaging in any meaningful conversation.  Not following verbal commands.  Restless and agitated.   HENT:      Mouth/Throat:      Pharynx: Oropharynx is clear.   Eyes:      Pupils: Pupils are equal, round, and reactive to light.   Cardiovascular:      Rate and Rhythm: Normal rate and regular rhythm.      Heart sounds: Normal heart sounds.   Pulmonary:      Effort: No respiratory distress.      Breath sounds: Normal breath sounds. No wheezing or " rhonchi.   Abdominal:      General: Abdomen is flat. Bowel sounds are normal. There is no distension.      Palpations: Abdomen is soft.      Tenderness: There is no abdominal tenderness.   Musculoskeletal:         General: No swelling.   Skin:     General: Skin is warm and dry.   Neurological:      General: No focal deficit present.           Relevant Results  Results for orders placed or performed during the hospital encounter of 08/17/24 (from the past 24 hour(s))   Light Blue Top   Result Value Ref Range    Extra Tube Hold for add-ons.    SST TOP   Result Value Ref Range    Extra Tube Hold for add-ons.    CBC and Auto Differential   Result Value Ref Range    WBC 8.0 4.4 - 11.3 x10*3/uL    nRBC 0.0 0.0 - 0.0 /100 WBCs    RBC 3.79 (L) 4.00 - 5.20 x10*6/uL    Hemoglobin 11.9 (L) 12.0 - 16.0 g/dL    Hematocrit 36.4 36.0 - 46.0 %    MCV 96 80 - 100 fL    MCH 31.4 26.0 - 34.0 pg    MCHC 32.7 32.0 - 36.0 g/dL    RDW 12.2 11.5 - 14.5 %    Platelets 291 150 - 450 x10*3/uL    Neutrophils % 74.5 40.0 - 80.0 %    Immature Granulocytes %, Automated 0.3 0.0 - 0.9 %    Lymphocytes % 11.9 13.0 - 44.0 %    Monocytes % 11.5 2.0 - 10.0 %    Eosinophils % 1.3 0.0 - 6.0 %    Basophils % 0.5 0.0 - 2.0 %    Neutrophils Absolute 5.97 (H) 1.60 - 5.50 x10*3/uL    Immature Granulocytes Absolute, Automated 0.02 0.00 - 0.50 x10*3/uL    Lymphocytes Absolute 0.95 0.80 - 3.00 x10*3/uL    Monocytes Absolute 0.92 (H) 0.05 - 0.80 x10*3/uL    Eosinophils Absolute 0.10 0.00 - 0.40 x10*3/uL    Basophils Absolute 0.04 0.00 - 0.10 x10*3/uL   Comprehensive metabolic panel   Result Value Ref Range    Glucose 95 74 - 99 mg/dL    Sodium 134 (L) 136 - 145 mmol/L    Potassium 3.5 3.5 - 5.3 mmol/L    Chloride 103 98 - 107 mmol/L    Bicarbonate 24 21 - 32 mmol/L    Anion Gap 11 10 - 20 mmol/L    Urea Nitrogen 16 6 - 23 mg/dL    Creatinine 0.76 0.50 - 1.05 mg/dL    eGFR 77 >60 mL/min/1.73m*2    Calcium 9.3 8.6 - 10.3 mg/dL    Albumin 4.1 3.4 - 5.0 g/dL     Alkaline Phosphatase 63 33 - 136 U/L    Total Protein 6.5 6.4 - 8.2 g/dL    AST 19 9 - 39 U/L    Bilirubin, Total 1.0 0.0 - 1.2 mg/dL    ALT 8 7 - 45 U/L   Magnesium   Result Value Ref Range    Magnesium 1.92 1.60 - 2.40 mg/dL   Lactate   Result Value Ref Range    Lactate 0.9 0.4 - 2.0 mmol/L   Urinalysis with Reflex Culture and Microscopic   Result Value Ref Range    Color, Urine Light-Yellow Light-Yellow, Yellow, Dark-Yellow    Appearance, Urine Turbid (N) Clear    Specific Gravity, Urine 1.016 1.005 - 1.035    pH, Urine 6.0 5.0, 5.5, 6.0, 6.5, 7.0, 7.5, 8.0    Protein, Urine NEGATIVE NEGATIVE, 10 (TRACE), 20 (TRACE) mg/dL    Glucose, Urine Normal Normal mg/dL    Blood, Urine NEGATIVE NEGATIVE    Ketones, Urine 10 (1+) (A) NEGATIVE mg/dL    Bilirubin, Urine NEGATIVE NEGATIVE    Urobilinogen, Urine Normal Normal mg/dL    Nitrite, Urine 2+ (A) NEGATIVE    Leukocyte Esterase, Urine 250 Ivette/µL (A) NEGATIVE   Microscopic Only, Urine   Result Value Ref Range    WBC, Urine 6-10 (A) 1-5, NONE /HPF    RBC, Urine NONE NONE, 1-2, 3-5 /HPF    Squamous Epithelial Cells, Urine 1-9 (SPARSE) Reference range not established. /HPF    Bacteria, Urine 2+ (A) NONE SEEN /HPF    Mucus, Urine FEW Reference range not established. /LPF    Amorphous Crystals, Urine 1+ NONE, 1+, 2+ /HPF        No results found.      Assessment/Plan       83-year-old female with a past medical history of vascular dementia, pelvic fracture, urinary tract infection, insomnia, incontinence to urine, who presented to the emergency room for generalized weakness and fatigue associated with the fall.  Workup in the emergency room showed findings consistent with urinary tract infection associated with an acute metabolic encephalopathy in the form of hyperactive delirium.    Admit patient to the inpatient medical service with vital signs monitoring.  I will start ceftriaxone 1 g IV daily for the possible urinary tract infection and follow-up with a urine culture final  report.  Consult physical therapy and Occupational Therapy to evaluate patient's functional status and consult  to assist with a safe discharge plan.  Resume home medications  SCDs for DVT prophylaxis  Patient is full code    (This note was generated with voice recognition software and may contain errors including spelling, grammar, syntax and misrecognition of what was dictated, that are not fully corrected)     Rj Mcintyre MD

## 2024-08-18 NOTE — PROGRESS NOTES
Physical Therapy    Physical Therapy Evaluation    Patient Name: Dinah Parmar  MRN: [id number]  Today's Date: 8/18/2024   Time Calculation  Start Time: 0926  Stop Time: 0934  Time Calculation (min): 8 min    Assessment/Plan   PT Assessment  PT Assessment Results: Decreased strength, Impaired balance, Decreased mobility, Decreased cognition, Impaired judgement, Decreased safety awareness  Rehab Prognosis: Good  Evaluation/Treatment Tolerance: Patient tolerated treatment well  Medical Staff Made Aware: Yes  Strengths: Premorbid level of function, Support of Caregivers  Barriers to Participation: Comorbidities, Housing layout  Assessment Comment: Pt will benefit from skilled PT services to addres the above stated impairments in order to reduce recurrance of additoinal falls and to improve safety in the home.  End of Session Patient Position: Up in chair, Alarm off, caregiver present  IP OR SWING BED PT PLAN  Inpatient or Swing Bed: Inpatient  PT Plan  Treatment/Interventions: Transfer training, Gait training, Stair training, Balance training, Neuromuscular re-education, Strengthening, Therapeutic exercise, Therapeutic activity, Home exercise program  PT Plan: Ongoing PT  PT Frequency: 5 times per week  PT Discharge Recommendations: Moderate intensity level of continued care  PT Recommended Transfer Status: Contact guard, Assist x1  PT - OK to Discharge:  (Yes from PT perspective with mobility)      Subjective   General Visit Information:  General  Reason for Referral: brendon jasso  Referred By: impaired mobility  Past Medical History Relevant to Rehab: dementia, UTI, frequent falls  Family/Caregiver Present: Yes ( present (also hospitalized) and provided entire subjective. Wife's sitter present from nursing staff.)  Patient Position Received: Up in chair  General Comment: Pt's  reports she generally falls 2x/wk. He called EMS to assist him in getting her off floor yesterday (she had taken all cushions off couch  and laid on ground then refused to get up).  Home Living:  Home Living  Home Living Comments: Lives with  in mobile home with 4 PATRICIA, B rail. Shower bench in shower with grab bars. Laundry in mobile home.  Prior Level of Function:  Prior Function Per Pt/Caregiver Report  Prior Function Comments: IND with cleaning (sweeps floors, does dishes, puts clothing away). Bathes at sink IND. Normally does not use assistive device to ambulate however  always with d/t imbalance and for safety concerns with dementia  Precautions:  Precautions  Medical Precautions: Fall precautions  Vital Signs:       Objective   Pain:  Pain Assessment  Pain Assessment: 0-10  0-10 (Numeric) Pain Score: 0 - No pain  Cognition:  Cognition  Orientation Level: Disoriented to place, Disoriented to time, Disoriented to situation    General Assessments:       Activity Tolerance  Endurance: Endurance does not limit participation in activity    Strength  Strength Comments: 5/5 MMT seated B hip flex, knee ext, ankle DF       Static Sitting Balance  Static Sitting-Balance Support: Bilateral upper extremity supported  Static Sitting-Level of Assistance: Contact guard  Static Sitting-Comment/Number of Minutes: 2 min in chair- keeps leaning backwards likely d/t core weakness  Functional Assessments:  Transfers  Transfer:  (Sit <> stand min A with BUE support)    Ambulation/Gait Training  Ambulation/Gait Training Performed:  (Ambulates 30 ft CGA and HHA with gait belt. Imbalance observed throughout.)    Outcome Measures:  [facility] Basic Mobility  Turning from your back to your side while in a flat bed without using bedrails: A little  Moving from lying on your back to sitting on the side of a flat bed without using bedrails: A little  Moving to and from bed to chair (including a wheelchair): A little  Standing up from a chair using your arms (e.g. wheelchair or bedside chair): A little  To walk in hospital room: A little  Climbing 3-5 steps with  railing: A little  Basic Mobility - Total Score: 18    Encounter Problems       Encounter Problems (Active)       Mobility       STG - Patient will navigate 4 stairs B rails for safety after discharge.        Start:  08/18/24    Expected End:  09/01/24            STG - Patient will ambulate 50 ft with least restrictive device for improved safety and reduce risk of falling.        Start:  08/18/24    Expected End:  09/01/24               PT Transfers       STG - Patient will transfer sit to and from stand CGA for reduced caregiver burden.        Start:  08/18/24    Expected End:  09/01/24               Pain - Adult              Education Documentation  Mobility Training, taught by Vicky Melendrez PT at 8/18/2024 10:02 AM.  Learner: Patient  Readiness: Acceptance  Method: Explanation  Response: Verbalizes Understanding  Comment: PT will come to assist pt in strengthening and improving balance.    Education Comments  No comments found.

## 2024-08-19 LAB — BACTERIA UR CULT: ABNORMAL

## 2024-08-19 PROCEDURE — 1100000001 HC PRIVATE ROOM DAILY

## 2024-08-19 PROCEDURE — 97110 THERAPEUTIC EXERCISES: CPT | Mod: GP,CQ

## 2024-08-19 PROCEDURE — 99232 SBSQ HOSP IP/OBS MODERATE 35: CPT

## 2024-08-19 PROCEDURE — 97165 OT EVAL LOW COMPLEX 30 MIN: CPT | Mod: GO

## 2024-08-19 PROCEDURE — 2500000001 HC RX 250 WO HCPCS SELF ADMINISTERED DRUGS (ALT 637 FOR MEDICARE OP): Performed by: INTERNAL MEDICINE

## 2024-08-19 PROCEDURE — G0378 HOSPITAL OBSERVATION PER HR: HCPCS

## 2024-08-19 PROCEDURE — 2500000004 HC RX 250 GENERAL PHARMACY W/ HCPCS (ALT 636 FOR OP/ED): Performed by: INTERNAL MEDICINE

## 2024-08-19 RX ADMIN — CEFTRIAXONE SODIUM 1 G: 1 INJECTION, SOLUTION INTRAVENOUS at 06:48

## 2024-08-19 RX ADMIN — ACETAMINOPHEN 650 MG: 325 TABLET ORAL at 08:43

## 2024-08-19 RX ADMIN — MEMANTINE 10 MG: 10 TABLET ORAL at 08:43

## 2024-08-19 RX ADMIN — MEMANTINE 10 MG: 10 TABLET ORAL at 21:47

## 2024-08-19 ASSESSMENT — COGNITIVE AND FUNCTIONAL STATUS - GENERAL
DRESSING REGULAR LOWER BODY CLOTHING: A LOT
DRESSING REGULAR LOWER BODY CLOTHING: A LOT
MOBILITY SCORE: 17
EATING MEALS: A LITTLE
DRESSING REGULAR UPPER BODY CLOTHING: A LOT
MOVING TO AND FROM BED TO CHAIR: A LITTLE
MOBILITY SCORE: 19
DAILY ACTIVITIY SCORE: 14
WALKING IN HOSPITAL ROOM: A LITTLE
STANDING UP FROM CHAIR USING ARMS: A LITTLE
PERSONAL GROOMING: A LITTLE
MOVING TO AND FROM BED TO CHAIR: A LITTLE
EATING MEALS: A LITTLE
CLIMB 3 TO 5 STEPS WITH RAILING: A LOT
MOVING TO AND FROM BED TO CHAIR: A LITTLE
TURNING FROM BACK TO SIDE WHILE IN FLAT BAD: A LITTLE
TOILETING: A LOT
MOVING FROM LYING ON BACK TO SITTING ON SIDE OF FLAT BED WITH BEDRAILS: A LITTLE
TOILETING: A LOT
CLIMB 3 TO 5 STEPS WITH RAILING: A LITTLE
HELP NEEDED FOR BATHING: A LOT
HELP NEEDED FOR BATHING: A LOT
STANDING UP FROM CHAIR USING ARMS: A LITTLE
MOBILITY SCORE: 17
MOVING FROM LYING ON BACK TO SITTING ON SIDE OF FLAT BED WITH BEDRAILS: A LITTLE
HELP NEEDED FOR BATHING: A LOT
TURNING FROM BACK TO SIDE WHILE IN FLAT BAD: A LITTLE
STANDING UP FROM CHAIR USING ARMS: A LITTLE
WALKING IN HOSPITAL ROOM: A LITTLE
TOILETING: A LOT
PERSONAL GROOMING: A LITTLE
DAILY ACTIVITIY SCORE: 14
DAILY ACTIVITIY SCORE: 14
DRESSING REGULAR LOWER BODY CLOTHING: A LOT
PERSONAL GROOMING: A LITTLE
CLIMB 3 TO 5 STEPS WITH RAILING: A LOT
EATING MEALS: A LITTLE
TURNING FROM BACK TO SIDE WHILE IN FLAT BAD: A LITTLE
DRESSING REGULAR UPPER BODY CLOTHING: A LOT
DRESSING REGULAR UPPER BODY CLOTHING: A LOT
WALKING IN HOSPITAL ROOM: A LITTLE

## 2024-08-19 ASSESSMENT — PAIN SCALES - PAIN ASSESSMENT IN ADVANCED DEMENTIA (PAINAD)
FACIALEXPRESSION: SMILING OR INEXPRESSIVE
BODYLANGUAGE: RELAXED
BREATHING: NORMAL
BODYLANGUAGE: RELAXED
FACIALEXPRESSION: SMILING OR INEXPRESSIVE
BREATHING: NORMAL
TOTALSCORE: 0
TOTALSCORE: 0
CONSOLABILITY: NO NEED TO CONSOLE
CONSOLABILITY: NO NEED TO CONSOLE
FACIALEXPRESSION: SMILING OR INEXPRESSIVE
CONSOLABILITY: NO NEED TO CONSOLE
TOTALSCORE: 0
BREATHING: NORMAL
BODYLANGUAGE: RELAXED
TOTALSCORE: MEDICATION (SEE MAR);EMOTIONAL SUPPORT

## 2024-08-19 ASSESSMENT — PAIN - FUNCTIONAL ASSESSMENT
PAIN_FUNCTIONAL_ASSESSMENT: 0-10
PAIN_FUNCTIONAL_ASSESSMENT: PAINAD (PAIN ASSESSMENT IN ADVANCED DEMENTIA SCALE)

## 2024-08-19 ASSESSMENT — ACTIVITIES OF DAILY LIVING (ADL)
BATHING_ASSISTANCE: MODERATE
LACK_OF_TRANSPORTATION: NO

## 2024-08-19 ASSESSMENT — PAIN SCALES - GENERAL
PAINLEVEL_OUTOF10: 0 - NO PAIN

## 2024-08-19 NOTE — PROGRESS NOTES
Occupational Therapy    Evaluation    Patient Name: Dinah Parmar  MRN: [id number]  Today's Date: 8/19/2024  Time Calculation  Start Time: 0751  Stop Time: 0816  Time Calculation (min): 25 min  301/301-A    Assessment  IP OT Assessment  OT Assessment: pt with deficits in cognition, safety/judgement, strength, and balance now needing increased assist for all ADL and mobility.  Recommend skilled OT services in order to ensure safe return to PLOF.   End of Session Communication: PCT/NA/CTA  End of Session Patient Position: Up in chair, Alarm on (PCT present)    Plan:  Treatment Interventions: ADL retraining, Visual perceptual retraining, Functional transfer training  OT Frequency: 3 times per week  OT Discharge Recommendations: Moderate intensity level of continued care  OT Recommended Transfer Status: Assist of 1    Subjective     Current Problem:  1. Generalized weakness            General:  General  Reason for Referral: per chart review: Pt's  reports she generally falls 2x/wk. He called EMS to assist him in getting her off floor yesterday (she had taken all cushions off couch and laid on ground then refused to get up). leg weakness and UTI  Referred By: Jl Mcintyre  Past Medical History Relevant to Rehab: dementia, UTI, frequent falls  Family/Caregiver Present: No  Prior to Session Communication: Bedside nurse  Patient Position Received: Bed, 3 rail up, Alarm on  General Comment: Pt's  reports she generally falls 2x/wk. He called EMS to assist him in getting her off floor yesterday (she had taken all cushions off couch and laid on ground then refused to get up).    Precautions:  Medical Precautions: Fall precautions      Pain:  Pain Assessment  Pain Assessment: 0-10  0-10 (Numeric) Pain Score:  (pt c/o pain in RUE at IV site, nurse notified.  pt unable to rate pain)    Objective     Cognition:  Overall Cognitive Status: Impaired at baseline  Memory: Exceptions to WFL  Short-Term Memory: Impaired  (severe)  Safety/Judgement: Unable to assess  Insight: Moderate             Home Living:  Type of Home: Mobile home  Lives With: Spouse  Home Layout: One level  Home Access: Stairs to enter with rails  Entrance Stairs-Number of Steps: 4  Bathroom Shower/Tub:  (per chart review pt only sponge bathes)     Prior Function:  Prior Function Comments: pt is unable to state due to dementia. per chart review: IND with cleaning (sweeps floors, does dishes, puts clothing away). Bathes at sink IND. Normally does not use assistive device to ambulate however  always with d/t imbalance and for safety concerns with dementia    ADL:  Eating Assistance: Minimal  Grooming Assistance: Moderate  Bathing Assistance: Moderate  UE Dressing Assistance: Moderate  LE Dressing Assistance: Maximal  Toileting Assistance with Device: Maximal    Activity Tolerance:  Endurance: Endurance does not limit participation in activity    Bed Mobility/Transfers:   Bed Mobility  Bed Mobility: Yes  Bed Mobility 1  Bed Mobility 1: Supine to sitting  Level of Assistance 1: Moderate assistance  Bed Mobility Comments 1: extra time.  max cues  Transfers  Transfer: Yes  Transfer 1  Technique 1: Sit to stand, Stand to sit  Transfer Device 1: Walker, Gait belt  Transfer Level of Assistance 1: Minimum assistance  Trials/Comments 1: from elevated bed.  hand over hand for correct hand placement with multiple corrections  Transfers 2  Technique 2: Sit to stand, Stand to sit  Transfer Device 2: Walker, Gait belt  Transfer Level of Assistance 2: Moderate assistance  Trials/Comments 2: from standard toilet    Ambulation/Gait Training:  Functional Mobility  Functional Mobility Performed: Yes  Functional Mobility 1  Surface 1: Level tile  Device 1: Rolling walker  Functional Mobility Support Devices: Gait belt  Assistance 1: Minimum assistance  Comments 1: pt needs constant cues to stay on task.  therapist advances FWW in order to pt to ambulate to targeted  position      Vision: Vision - Basic Assessment  Current Vision: No visual deficits    Strength:  Strength Comments: pt unable to follow commands for MMT    Outcome Measures: [facility] Daily Activity  Putting on and taking off regular lower body clothing: A lot  Bathing (including washing, rinsing, drying): A lot  Putting on and taking off regular upper body clothing: A lot  Toileting, which includes using toilet, bedpan or urinal: A lot  Taking care of personal grooming such as brushing teeth: A little  Eating Meals: A little  Daily Activity - Total Score: 14                       EDUCATION:     Education Documentation  Body Mechanics, taught by Lindsay Melchor OT at 8/19/2024  8:36 AM.  Learner: Patient  Readiness: Eager  Method: Demonstration, Explanation  Response: No Evidence of Learning    ADL Training, taught by Lindsay Melchor OT at 8/19/2024  8:36 AM.  Learner: Patient  Readiness: Eager  Method: Demonstration, Explanation  Response: No Evidence of Learning    Education Comments  No comments found.        Goals:   Encounter Problems       Encounter Problems (Active)       ADLs       Patient will perform UB bathing  with supervision level of assistance. (Progressing)       Start:  08/19/24    Expected End:  09/02/24            Patient with complete lower body dressing with contact guard assist level of assistance  (Progressing)       Start:  08/19/24    Expected End:  09/02/24            Patient will complete toileting including hygiene clothing management/hygiene with contact guard assist level of assistance (Progressing)       Start:  08/19/24    Expected End:  09/02/24               BALANCE       Pt will maintain dynamic standing balance during ADL task with contact guard assist level of assistance in order to demonstrate decreased risk of falling and improved postural control. (Progressing)       Start:  08/19/24    Expected End:  09/02/24

## 2024-08-19 NOTE — PROGRESS NOTES
08/19/24 1254   Discharge Planning   Living Arrangements Spouse/significant other   Support Systems Spouse/significant other;Children   Assistance Needed Lisette x 1   Type of Residence Private residence   Number of Stairs to Enter Residence 4   Number of Stairs Within Residence 0   Do you have animals or pets at home? No   Who is requesting discharge planning? Provider   Home or Post Acute Services Post acute facilities (Rehab/SNF/etc)   Type of Post Acute Facility Services Rehab   Expected Discharge Disposition SNF   Does the patient need discharge transport arranged? Yes   RoundTrip coordination needed? Yes   Has discharge transport been arranged? No   Financial Resource Strain   How hard is it for you to pay for the very basics like food, housing, medical care, and heating? Somewhat  (Answered per spouse at bedside)   Housing Stability   In the last 12 months, was there a time when you were not able to pay the mortgage or rent on time? N   In the past 12 months, how many times have you moved where you were living? 0   At any time in the past 12 months, were you homeless or living in a shelter (including now)? N   Transportation Needs   In the past 12 months, has lack of transportation kept you from medical appointments or from getting medications? no   In the past 12 months, has lack of transportation kept you from meetings, work, or from getting things needed for daily living? No   Patient Choice   Provider Choice list and CMS website (https://medicare.gov/care-compare#search) for post-acute Quality and Resource Measure Data were provided and reviewed with: Family     Care Transitions: Met with patient and  at bedside. Patient  is also currently admitted here and is sitting in same room. Role of TCC explained. Patient is oriented to self only. Unable to answer questions appropriately concerning discharge planning so  is assisting. Demographics and contacts verified. Will update chart with  patient's 2 sons. They both reside in [place]. Their son Chino is flying in today to assist them.  states patient has been confused at home and he found her on the floor but he was too weak himself to assist her up. She has been independent at home with bathing/dressing/toileting herself. She does not drive or cook. She receives meals from [employer]. Her  is her mode of transportation and her main caregiver. AMPAC score 14/17 per therapy with recommendations for rehab. Patient  is also agreeable for her to have rehab before returning home. Facility choices given per  since patient is unable to do so. Will send referrals to [facility], [facility], and [facility] in [facility]. Medicare IMM reviewed, patient  signed, copy provided, original placed in chart. Voiced understanding.  Care team to follow for SNF acceptance and precrt. Wendie Alejo RN/TCC    -1031 Patient reviewed in care round meeting this AM. ADOD 24 hours. Wendie Alejo RN/TCC    -[id number] SNF referral status checked in [facility]. [facility] unable to accept due to no beds. [facility] and [facility] still under review. Met with patient,  and son Chino at bedside. Updated Chino on discharge planning and referral status. Wendie Alejo RN/TCC

## 2024-08-19 NOTE — PROGRESS NOTES
Physical Therapy    Physical Therapy    Physical Therapy Treatment    Patient Name: Dinah Parmar  MRN: [id number]  Today's Date: 8/19/2024  Time Calculation  Start Time: 1207  Stop Time: 1229  Time Calculation (min): 22 min     301/301-A    Assessment/Plan   Assessment Comment: Thi session she was able to follow verbal and tactile cues for exercise instruction. During gait she was unsteady with turns and was stepping outside FWW. She required mid to  mod assist to maintain dynamic standing balance . Verbal cues to stay inside walker when backing up to chair. Cont with CKC exercises and NMRE to increased stability with standing ADLs and safety with ambulation.  End of Session Patient Position: Up in chair, Alarm on ( in room)     PT Plan  Treatment/Interventions: Transfer training, Gait training, Stair training, Balance training, Neuromuscular re-education, Strengthening, Therapeutic exercise, Therapeutic activity, Home exercise program  PT Plan: Ongoing PT  PT Frequency: 5 times per week  PT Discharge Recommendations: Moderate intensity level of continued care  PT Recommended Transfer Status: Contact guard, Assist x1  PT - OK to Discharge:  (Yes from PT perspective with mobility)    Current Problem:  Patient Active Problem List   Diagnosis    Kyphosis (acquired) (postural)    Vaginal dryness    Vascular dementia without behavioral disturbance (Multi)    Age-related osteoporosis without current pathological fracture    Urge incontinence of urine    Weakness    Generalized weakness       General Visit Information:      General  Reason for Referral: t  Subjective  She denies pain. She is agreeable to therex and small walk in underwood.     Precautions:  Precautions  Medical Precautions: Fall precautions    Vital Signs:  Vital Signs  Heart Rate: 65  SpO2: 97 %  Objective     Pain:  Pain Assessment  Pain Assessment: 0-10  0-10 (Numeric) Pain Score: 0 - No pain (No objective signs of pain during session.)    Cognition:  Impaired at baseline          Treatments:  Therapeutic Exercise  Therapeutic Exercise Performed: Yes  Therapeutic Exercise Activity 1: AP  Therapeutic Exercise Activity 2: resisted 4 way ankle  Therapeutic Exercise Activity 3: Hip abd/Add  Therapeutic Exercise Activity 4: SAQ  Therapeutic Exercise Activity 5: resisted HSC  Therapeutic Exercise Activity 6: LAQ  Therapeutic Exercise Activity 7: Clamshells resisted  Therapeutic Exercise Activity 8: Hip add resisted           Ambulation/Gait Training  Ambulation/Gait Training Performed: Yes  Ambulation/Gait Training 1  Surface 1: Level tile  Device 1: Rolling walker  Gait Support Devices: Gait belt  Assistance 1: Minimum assistance  Comments/Distance (ft) 1: 47'  Transfers  Transfer: Yes  Transfer 1  Transfer From 1: Chair with arms to  Transfer to 1: Chair with arms  Technique 1: Sit to stand, Stand to sit  Transfer Device 1: Walker, Gait belt  Transfer Level of Assistance 1: Contact guard          Outcome Measures:     [facility] Basic Mobility  Turning from your back to your side while in a flat bed without using bedrails: A little  Moving from lying on your back to sitting on the side of a flat bed without using bedrails: A little  Moving to and from bed to chair (including a wheelchair): A little  Standing up from a chair using your arms (e.g. wheelchair or bedside chair): A little  To walk in hospital room: A little  Climbing 3-5 steps with railing: A lot  Basic Mobility - Total Score: 17                                      Education Documentation  Mobility Training, taught by Osiris Gates PTA at 8/19/2024 12:45 PM.  Learner: Family, Patient  Readiness: Acceptance  Method: Explanation  Response: Needs Reinforcement    Education Comments  No comments found.           EDUCATION:     Encounter Problems       Encounter Problems (Active)       Mobility       STG - Patient will navigate 4 stairs B rails for safety after discharge.  (Progressing)       Start:  08/18/24     Expected End:  09/01/24            STG - Patient will ambulate 50 ft with least restrictive device for improved safety and reduce risk of falling.  (Progressing)       Start:  08/18/24    Expected End:  09/01/24               PT Transfers       STG - Patient will transfer sit to and from stand [facility] for reduced caregiver burden.  (Progressing)       Start:  08/18/24    Expected End:  09/01/24               Pain - Adult

## 2024-08-19 NOTE — PROGRESS NOTES
Adrian Parmar is a 83 y.o. female on day 1 of admission presenting with Weakness.      Subjective   Seen and examined this morning.  Patient has been present in the room.  Patient verbalizes no complaints.  Alert and oriented to herself.  History of vascular dementia.  Sitting up in a chair comfortably, respirations even nonlabored.  Denies any pain       Objective     Last Recorded Vitals  /52 (BP Location: Left arm, Patient Position: Lying)   Pulse 70   Temp 36.1 °C (97 °F) (Temporal)   Resp 16   Wt 47.8 kg (105 lb 6.1 oz)   SpO2 95%   Intake/Output last 3 Shifts:    Intake/Output Summary (Last 24 hours) at 8/19/2024 0942  Last data filed at 8/19/2024 0718  Gross per 24 hour   Intake 50 ml   Output --   Net 50 ml       Admission Weight  Weight: 49 kg (108 lb) (08/17/24 2254)    Daily Weight  08/18/24 : 47.8 kg (105 lb 6.1 oz)    Image Results  XR tibia and fibia  MRN: [id number]  Patient Name: ADRIAN PARMAR     STUDY:  TIBIA ; Right;  8/24/2022 8:10 pm     INDICATION:  wound .     COMPARISON:  None.     ACCESSION NUMBER(S):  [id number]     ORDERING CLINICIAN:  CINDY ABDUL     FINDINGS:  No osseous destruction or erosive change. No acute fractures or  malalignment. The bones are osteopenic. No soft tissue gas or  radiopaque foreign bodies.     IMPRESSION:  No soft tissue gas or radiographic evidence of osteomyelitis.         Physical Exam  Constitutional:       General: She is not in acute distress.     Appearance: She is ill-appearing.      Comments: Awake.  Oriented only to self.  Not engaging in any meaningful conversation.  Not following verbal commands.  Restless and agitated.   HENT:      Mouth/Throat:      Pharynx: Oropharynx is clear.   Eyes:      Pupils: Pupils are equal, round, and reactive to light.   Cardiovascular:      Rate and Rhythm: Normal rate and regular rhythm.      Heart sounds: Normal heart sounds.   Pulmonary:      Effort: No respiratory distress.      Breath sounds: Normal breath  sounds. No wheezing or rhonchi.   Abdominal:      General: Abdomen is flat. Bowel sounds are normal. There is no distension.      Palpations: Abdomen is soft.      Tenderness: There is no abdominal tenderness.   Musculoskeletal:         General: No swelling.   Skin:     General: Skin is warm and dry.   Neurological:      General: No focal deficit present.   Relevant Results             Scheduled medications  cefTRIAXone, 1 g, intravenous, q24h  memantine, 10 mg, oral, BID      Continuous medications     PRN medications  PRN medications: acetaminophen **OR** acetaminophen **OR** acetaminophen, haloperidol lactate, magnesium hydroxide, ondansetron ODT **OR** ondansetron  No results found for this or any previous visit (from the past 24 hour(s)).   Assessment/Plan        83-year-old female with a past medical history of vascular dementia, pelvic fracture, urinary tract infection, insomnia, incontinence to urine, who presented to the emergency room for generalized weakness and fatigue associated with the fall.  Workup in the emergency room showed findings consistent with urinary tract infection associated with an acute metabolic encephalopathy in the form of hyperactive delirium.     Assessment & Plan  Weakness    Generalized weakness    Weakness  UTI  Metabolic encephalopathy in the setting of history of vascular dementia    Plan:  -Continue IV Rocephin  -Urine culture pending  -PT/OT evaluation and treatment    Full code    Discharge disposition: More likely will need SNF on discharge.   in agreement.   to assist with discharge planning.              SHELL Arellano-CNP

## 2024-08-20 LAB
ANION GAP SERPL CALC-SCNC: 13 MMOL/L (ref 10–20)
BUN SERPL-MCNC: 10 MG/DL (ref 6–23)
CALCIUM SERPL-MCNC: 9.5 MG/DL (ref 8.6–10.3)
CHLORIDE SERPL-SCNC: 103 MMOL/L (ref 98–107)
CO2 SERPL-SCNC: 23 MMOL/L (ref 21–32)
CREAT SERPL-MCNC: 0.68 MG/DL (ref 0.5–1.05)
EGFRCR SERPLBLD CKD-EPI 2021: 85 ML/MIN/1.73M*2
ERYTHROCYTE [DISTWIDTH] IN BLOOD BY AUTOMATED COUNT: 12.3 % (ref 11.5–14.5)
GLUCOSE SERPL-MCNC: 104 MG/DL (ref 74–99)
HCT VFR BLD AUTO: 39.5 % (ref 36–46)
HGB BLD-MCNC: 13.4 G/DL (ref 12–16)
MCH RBC QN AUTO: 31.8 PG (ref 26–34)
MCHC RBC AUTO-ENTMCNC: 33.9 G/DL (ref 32–36)
MCV RBC AUTO: 94 FL (ref 80–100)
NRBC BLD-RTO: 0 /100 WBCS (ref 0–0)
PLATELET # BLD AUTO: 344 X10*3/UL (ref 150–450)
POTASSIUM SERPL-SCNC: 4 MMOL/L (ref 3.5–5.3)
RBC # BLD AUTO: 4.21 X10*6/UL (ref 4–5.2)
SODIUM SERPL-SCNC: 135 MMOL/L (ref 136–145)
WBC # BLD AUTO: 8 X10*3/UL (ref 4.4–11.3)

## 2024-08-20 PROCEDURE — G0378 HOSPITAL OBSERVATION PER HR: HCPCS

## 2024-08-20 PROCEDURE — 97116 GAIT TRAINING THERAPY: CPT | Mod: GP,CQ

## 2024-08-20 PROCEDURE — 2500000004 HC RX 250 GENERAL PHARMACY W/ HCPCS (ALT 636 FOR OP/ED): Performed by: INTERNAL MEDICINE

## 2024-08-20 PROCEDURE — 85027 COMPLETE CBC AUTOMATED: CPT

## 2024-08-20 PROCEDURE — 99232 SBSQ HOSP IP/OBS MODERATE 35: CPT

## 2024-08-20 PROCEDURE — 2500000001 HC RX 250 WO HCPCS SELF ADMINISTERED DRUGS (ALT 637 FOR MEDICARE OP): Performed by: INTERNAL MEDICINE

## 2024-08-20 PROCEDURE — 82374 ASSAY BLOOD CARBON DIOXIDE: CPT

## 2024-08-20 PROCEDURE — 36415 COLL VENOUS BLD VENIPUNCTURE: CPT

## 2024-08-20 PROCEDURE — 1100000001 HC PRIVATE ROOM DAILY

## 2024-08-20 RX ADMIN — CEFTRIAXONE SODIUM 1 G: 1 INJECTION, SOLUTION INTRAVENOUS at 06:47

## 2024-08-20 RX ADMIN — MEMANTINE 10 MG: 10 TABLET ORAL at 08:00

## 2024-08-20 ASSESSMENT — COGNITIVE AND FUNCTIONAL STATUS - GENERAL
MOVING TO AND FROM BED TO CHAIR: A LITTLE
MOBILITY SCORE: 17
WALKING IN HOSPITAL ROOM: A LITTLE
EATING MEALS: A LITTLE
STANDING UP FROM CHAIR USING ARMS: A LITTLE
TOILETING: A LOT
PERSONAL GROOMING: A LITTLE
STANDING UP FROM CHAIR USING ARMS: A LITTLE
WALKING IN HOSPITAL ROOM: A LITTLE
TOILETING: A LOT
MOVING FROM LYING ON BACK TO SITTING ON SIDE OF FLAT BED WITH BEDRAILS: A LITTLE
TURNING FROM BACK TO SIDE WHILE IN FLAT BAD: A LITTLE
MOVING FROM LYING ON BACK TO SITTING ON SIDE OF FLAT BED WITH BEDRAILS: A LITTLE
WALKING IN HOSPITAL ROOM: A LITTLE
DAILY ACTIVITIY SCORE: 14
PERSONAL GROOMING: A LITTLE
MOVING TO AND FROM BED TO CHAIR: A LITTLE
STANDING UP FROM CHAIR USING ARMS: A LITTLE
DRESSING REGULAR LOWER BODY CLOTHING: A LOT
TURNING FROM BACK TO SIDE WHILE IN FLAT BAD: A LITTLE
MOBILITY SCORE: 17
CLIMB 3 TO 5 STEPS WITH RAILING: A LOT
DRESSING REGULAR UPPER BODY CLOTHING: A LOT
MOBILITY SCORE: 17
HELP NEEDED FOR BATHING: A LOT
EATING MEALS: A LITTLE
DRESSING REGULAR UPPER BODY CLOTHING: A LOT
CLIMB 3 TO 5 STEPS WITH RAILING: A LOT
MOVING TO AND FROM BED TO CHAIR: A LITTLE
MOVING FROM LYING ON BACK TO SITTING ON SIDE OF FLAT BED WITH BEDRAILS: A LITTLE
DRESSING REGULAR LOWER BODY CLOTHING: A LOT
CLIMB 3 TO 5 STEPS WITH RAILING: A LOT
DAILY ACTIVITIY SCORE: 14
TURNING FROM BACK TO SIDE WHILE IN FLAT BAD: A LITTLE
HELP NEEDED FOR BATHING: A LOT

## 2024-08-20 ASSESSMENT — PAIN - FUNCTIONAL ASSESSMENT
PAIN_FUNCTIONAL_ASSESSMENT: 0-10
PAIN_FUNCTIONAL_ASSESSMENT: 0-10

## 2024-08-20 ASSESSMENT — PAIN SCALES - GENERAL
PAINLEVEL_OUTOF10: 0 - NO PAIN

## 2024-08-20 NOTE — PROGRESS NOTES
Per medical team, patient is medically appropriate for discharge today, but continues to await an accepting SNF. Per [facility], [facility] cannot accept; [facility] and [facility] are still considering. ABDIAS spoke with [facility] who will continue to consider referral. SW went to patient's room where there was no family present. Patient is confused at baseline. SW assured patient that SW is continuing to work with patient's family toward a good plan. Patient expressed appreciation. SW spoke with patient's son/Omar and /Brandon who agreed for additional referrals to be sent, though family prefers for patient to have SNF placement in [place]. SW/DSC to continue to send updated notes to referral facilities via [facility] as notes available.  - 1410: [facility] is considering patient, and may do an onsite visit today. [facility] and [facility] are out of network with insurance. [facility] has not responded. [facility] has no bed available before Friday. Plan for patient to discharge to accepting facility pending insurance auth. Care Transitions to follow and assist. MEGAN Aguilar

## 2024-08-20 NOTE — CARE PLAN
The patient's goals for the shift include get some sleep    The clinical goals for the shift include no falls throughout shift

## 2024-08-20 NOTE — PROGRESS NOTES
Physical Therapy    Physical Therapy Treatment    Patient Name: Dinah Parmar  MRN: [id number]  Today's Date: 8/20/2024  Time Calculation  Start Time: 1154  Stop Time: 1209  Time Calculation (min): 15 min     301/301-A    Assessment/Plan   PT Assessment  PT Assessment Results: Decreased strength, Impaired balance, Decreased mobility, Decreased cognition, Impaired judgement, Decreased safety awareness  Rehab Prognosis: Good  Evaluation/Treatment Tolerance: Patient tolerated treatment well  Medical Staff Made Aware: Yes  Strengths: Premorbid level of function, Support of Caregivers  Barriers to Participation: Comorbidities, Housing layout  End of Session Communication: Bedside nurse  Assessment Comment: Pt. required frequent cueing with guiding the walker during gait.  Pt. is unsteady and needed cues to stay within the walker during gait and transfers.  Pt. challenged with backing up to the chair after walk.  Pt. demo's some confusion this am during session.  End of Session Patient Position: Up in chair, Alarm on (Call light within reach)  PT Plan  Inpatient/Swing Bed or Outpatient: Inpatient  PT Plan  Treatment/Interventions: Transfer training, Gait training, Stair training, Balance training, Neuromuscular re-education, Strengthening, Therapeutic exercise, Therapeutic activity, Home exercise program  PT Plan: Ongoing PT  PT Frequency: 5 times per week  PT Discharge Recommendations: Moderate intensity level of continued care  PT Recommended Transfer Status: Contact guard, Assist x1  PT - OK to Discharge:  (Yes from PT perspective with mobility)    Current Problem:  Patient Active Problem List   Diagnosis    Kyphosis (acquired) (postural)    Vaginal dryness    Vascular dementia without behavioral disturbance (Multi)    Age-related osteoporosis without current pathological fracture    Urge incontinence of urine    Weakness    Generalized weakness       General Visit Information:   PT  Visit  PT Received On:  08/20/24  General  Family/Caregiver Present: No  Prior to Session Communication: Bedside nurse  Patient Position Received: Up in chair, Alarm on  Subjective     Precautions:  Precautions  Medical Precautions: Fall precautions    Vital Signs:  Vital Signs  SpO2: 97 %  Objective     Pain:  Pain Assessment  Pain Assessment: 0-10  0-10 (Numeric) Pain Score: 0 - No pain    Cognition:       Postural Control:       Extremity/Trunk Assessments:                Activity Tolerance:       Treatments:              Ambulation/Gait Training  Ambulation/Gait Training Performed: Yes  Ambulation/Gait Training 1  Surface 1: Level tile  Device 1: Rolling walker  Gait Support Devices: Gait belt  Assistance 1: Minimum assistance  Quality of Gait 1: Narrow base of support  Comments/Distance (ft) 1: 85ft  Transfers  Transfer: Yes  Transfer 1  Technique 1: Sit to stand, Stand to sit  Transfer Device 1: Walker, Gait belt  Transfer Level of Assistance 1: Contact guard, Minimum assistance          Outcome Measures:     [facility] Basic Mobility  Turning from your back to your side while in a flat bed without using bedrails: A little  Moving from lying on your back to sitting on the side of a flat bed without using bedrails: A little  Moving to and from bed to chair (including a wheelchair): A little  Standing up from a chair using your arms (e.g. wheelchair or bedside chair): A little  To walk in hospital room: A little  Climbing 3-5 steps with railing: A lot  Basic Mobility - Total Score: 17                                      Education Documentation  Mobility Training, taught by Wendie Moeller PTA at 8/20/2024 12:36 PM.  Learner: Patient  Readiness: Acceptance  Method: Explanation  Response: Needs Reinforcement    Education Comments  No comments found.           EDUCATION:     Encounter Problems       Encounter Problems (Active)       Mobility       STG - Patient will navigate 4 stairs B rails for safety after discharge.  (Progressing)        Start:  08/18/24    Expected End:  09/01/24            STG - Patient will ambulate 50 ft with least restrictive device for improved safety and reduce risk of falling.  (Progressing)       Start:  08/18/24    Expected End:  09/01/24               PT Transfers       STG - Patient will transfer sit to and from [facility] for reduced caregiver burden.  (Progressing)       Start:  08/18/24    Expected End:  09/01/24               Pain - Adult

## 2024-08-20 NOTE — PROGRESS NOTES
Adrian Parmar is a 83 y.o. female on day 2 of admission presenting with Weakness.      Subjective   Seen and examined this morning.   and the son at the bedside patient verbalizes no complaints.  Alert and oriented to herself.  History of vascular dementia.  Sitting up in a chair comfortably, respirations even nonlabored.  Denies any pain.  Awaiting pre-CERT to SNF       Objective     Last Recorded Vitals  /57 (BP Location: Left arm, Patient Position: Sitting)   Pulse 78   Temp 36.5 °C (97.7 °F) (Temporal)   Resp 20   Wt 47.8 kg (105 lb 6.1 oz)   SpO2 96%   Intake/Output last 3 Shifts:    Intake/Output Summary (Last 24 hours) at 8/20/2024 1151  Last data filed at 8/20/2024 0906  Gross per 24 hour   Intake 790 ml   Output --   Net 790 ml       Admission Weight  Weight: 49 kg (108 lb) (08/17/24 2254)    Daily Weight  08/18/24 : 47.8 kg (105 lb 6.1 oz)    Image Results  XR tibia and fibia  MRN: [id number]  Patient Name: ADRIAN PARMAR     STUDY:  TIBIA ; Right;  8/24/2022 8:10 pm     INDICATION:  wound .     COMPARISON:  None.     ACCESSION NUMBER(S):  [id number]     ORDERING CLINICIAN:  CINDY ABDUL     FINDINGS:  No osseous destruction or erosive change. No acute fractures or  malalignment. The bones are osteopenic. No soft tissue gas or  radiopaque foreign bodies.     IMPRESSION:  No soft tissue gas or radiographic evidence of osteomyelitis.         Physical Exam  Constitutional:       General: She is not in acute distress.     Appearance: She is ill-appearing.      Comments: Awake.  Oriented only to self.  Not engaging in any meaningful conversation.  Not following verbal commands.  Restless and agitated.   HENT:      Mouth/Throat:      Pharynx: Oropharynx is clear.   Eyes:      Pupils: Pupils are equal, round, and reactive to light.   Cardiovascular:      Rate and Rhythm: Normal rate and regular rhythm.      Heart sounds: Normal heart sounds.   Pulmonary:      Effort: No respiratory distress.       Breath sounds: Normal breath sounds. No wheezing or rhonchi.   Abdominal:      General: Abdomen is flat. Bowel sounds are normal. There is no distension.      Palpations: Abdomen is soft.      Tenderness: There is no abdominal tenderness.   Musculoskeletal:         General: No swelling.   Skin:     General: Skin is warm and dry.   Neurological:      General: No focal deficit present.   Relevant Results             Scheduled medications  cefTRIAXone, 1 g, intravenous, q24h  memantine, 10 mg, oral, BID      Continuous medications     PRN medications  PRN medications: acetaminophen **OR** acetaminophen **OR** acetaminophen, haloperidol lactate, magnesium hydroxide, ondansetron ODT **OR** ondansetron  Results for orders placed or performed during the hospital encounter of 08/17/24 (from the past 24 hour(s))   CBC   Result Value Ref Range    WBC 8.0 4.4 - 11.3 x10*3/uL    nRBC 0.0 0.0 - 0.0 /100 WBCs    RBC 4.21 4.00 - 5.20 x10*6/uL    Hemoglobin 13.4 12.0 - 16.0 g/dL    Hematocrit 39.5 36.0 - 46.0 %    MCV 94 80 - 100 fL    MCH 31.8 26.0 - 34.0 pg    MCHC 33.9 32.0 - 36.0 g/dL    RDW 12.3 11.5 - 14.5 %    Platelets 344 150 - 450 x10*3/uL   Basic Metabolic Panel   Result Value Ref Range    Glucose 104 (H) 74 - 99 mg/dL    Sodium 135 (L) 136 - 145 mmol/L    Potassium 4.0 3.5 - 5.3 mmol/L    Chloride 103 98 - 107 mmol/L    Bicarbonate 23 21 - 32 mmol/L    Anion Gap 13 10 - 20 mmol/L    Urea Nitrogen 10 6 - 23 mg/dL    Creatinine 0.68 0.50 - 1.05 mg/dL    eGFR 85 >60 mL/min/1.73m*2    Calcium 9.5 8.6 - 10.3 mg/dL      Assessment/Plan        83-year-old female with a past medical history of vascular dementia, pelvic fracture, urinary tract infection, insomnia, incontinence to urine, who presented to the emergency room for generalized weakness and fatigue associated with the fall.  Workup in the emergency room showed findings consistent with urinary tract infection associated with an acute metabolic encephalopathy in the form  of hyperactive delirium.     Assessment & Plan  Weakness    Generalized weakness    Weakness  UTI  Metabolic encephalopathy in the setting of history of vascular dementia    Plan:  -Received IV Rocephin for 3 days  -Continue home dose Namenda  -PT/OT evaluation and treatment    Full code    Discharge disposition: Awaiting pre-CERT to SNF.          Jia Lea, SHELL-CNP

## 2024-08-20 NOTE — CARE PLAN
The clinical goals for the shift include no falls, pt will rest quietly in bed and sleep some, and will not fall or be injured.

## 2024-08-21 PROCEDURE — G0378 HOSPITAL OBSERVATION PER HR: HCPCS

## 2024-08-21 PROCEDURE — 99239 HOSP IP/OBS DSCHRG MGMT >30: CPT

## 2024-08-21 PROCEDURE — 97530 THERAPEUTIC ACTIVITIES: CPT | Mod: GO

## 2024-08-21 PROCEDURE — 2500000001 HC RX 250 WO HCPCS SELF ADMINISTERED DRUGS (ALT 637 FOR MEDICARE OP): Performed by: INTERNAL MEDICINE

## 2024-08-21 PROCEDURE — 97530 THERAPEUTIC ACTIVITIES: CPT | Mod: GP

## 2024-08-21 PROCEDURE — 97535 SELF CARE MNGMENT TRAINING: CPT | Mod: GO

## 2024-08-21 PROCEDURE — 97116 GAIT TRAINING THERAPY: CPT | Mod: GP

## 2024-08-21 PROCEDURE — 1100000001 HC PRIVATE ROOM DAILY

## 2024-08-21 RX ADMIN — MEMANTINE 10 MG: 10 TABLET ORAL at 20:26

## 2024-08-21 RX ADMIN — MEMANTINE 10 MG: 10 TABLET ORAL at 09:55

## 2024-08-21 ASSESSMENT — PAIN SCALES - GENERAL
PAINLEVEL_OUTOF10: 0 - NO PAIN

## 2024-08-21 ASSESSMENT — COGNITIVE AND FUNCTIONAL STATUS - GENERAL
DAILY ACTIVITIY SCORE: 14
EATING MEALS: A LITTLE
EATING MEALS: A LITTLE
MOVING FROM LYING ON BACK TO SITTING ON SIDE OF FLAT BED WITH BEDRAILS: A LITTLE
WALKING IN HOSPITAL ROOM: A LITTLE
MOBILITY SCORE: 16
TURNING FROM BACK TO SIDE WHILE IN FLAT BAD: A LITTLE
CLIMB 3 TO 5 STEPS WITH RAILING: TOTAL
WALKING IN HOSPITAL ROOM: A LITTLE
DAILY ACTIVITIY SCORE: 14
MOVING FROM LYING ON BACK TO SITTING ON SIDE OF FLAT BED WITH BEDRAILS: A LITTLE
MOBILITY SCORE: 16
PERSONAL GROOMING: A LITTLE
STANDING UP FROM CHAIR USING ARMS: A LITTLE
DRESSING REGULAR LOWER BODY CLOTHING: A LOT
TURNING FROM BACK TO SIDE WHILE IN FLAT BAD: A LITTLE
MOVING TO AND FROM BED TO CHAIR: A LITTLE
TOILETING: A LOT
CLIMB 3 TO 5 STEPS WITH RAILING: TOTAL
DRESSING REGULAR UPPER BODY CLOTHING: A LOT
HELP NEEDED FOR BATHING: A LOT
STANDING UP FROM CHAIR USING ARMS: A LITTLE
PERSONAL GROOMING: A LITTLE
HELP NEEDED FOR BATHING: A LOT
MOVING TO AND FROM BED TO CHAIR: A LITTLE
DRESSING REGULAR UPPER BODY CLOTHING: A LOT
DRESSING REGULAR LOWER BODY CLOTHING: A LOT
TOILETING: A LOT

## 2024-08-21 ASSESSMENT — PAIN - FUNCTIONAL ASSESSMENT
PAIN_FUNCTIONAL_ASSESSMENT: 0-10

## 2024-08-21 ASSESSMENT — ACTIVITIES OF DAILY LIVING (ADL): HOME_MANAGEMENT_TIME_ENTRY: 9

## 2024-08-21 NOTE — NURSING NOTE
Pts  Brandon at bedside. Pts  taking pts wallet home with him to help fill out paperwork related to SNF placement.

## 2024-08-21 NOTE — PROGRESS NOTES
Per medical team, patient is medically appropriate for discharge today, but continues to await a final SNF placement. Patient's /son request an [facility] facility if possible. Only [facility] has not yet responded; other [facility] facilities cannot accept. [facility] is currently the only accepting facility.  left a message for [facility]; awaits a call back. SW to speak with patient's family to review discharge plan and Medicare IMM, as patient is confused at baseline. SW/DSC to continue to send updated notes to referral facilities via [facility] as notes available.  - 1200: SW spoke with patient's  and son to review plan and Medicare IMM. As [facility] has ot responded, patient's family agreeable for patient to go to [facility] pending auth. ABDIAS asked [facility] Auth Team to submit for same. Per auth team, the facility will have to submit for insurance auth for this particular plan. SW relayed same to [facility] via [facility], and awaits outcome. Plan for patient is to discharge to [facility] pending insurance auth. Avoidable days to be entered from today if auth not received. Care Transitions to follow and assist. MEGAN Aguilar

## 2024-08-21 NOTE — DISCHARGE SUMMARY
Discharge Diagnosis  Weakness    Issues Requiring Follow-Up  Weakness and UTI    Discharge Meds     Your medication list        CONTINUE taking these medications        Instructions Last Dose Given Next Dose Due   ascorbic acid 500 mg tablet  Commonly known as: Vitamin C           cholecalciferol 50,000 unit capsule  Commonly known as: Vitamin D-3           diphenhydrAMINE-acetaminophen  mg per tablet  Commonly known as: Tylenol PM           ibandronate 150 mg tablet  Commonly known as: Boniva      Take 1 tablet (150 mg) by mouth every 30 (thirty) days.       ibuprofen 200 mg tablet           memantine 28 mg capsule,sprinkle,ER 24hr  Commonly known as: Namenda      Take 1 capsule (28 mg) by mouth once daily.       RED BEET ROOT-SOUR CHERRY EXT ORAL                    Test Results Pending At Discharge  Pending Labs       No current pending labs.            Hospital Course   83-year-old female with a past medical history of vascular dementia, pelvic fracture, urinary tract infection, insomnia, incontinence to urine, who presented to the emergency room for generalized weakness and fatigue associated with the fall. Workup in the emergency room showed findings consistent with urinary tract infection associated with an acute metabolic encephalopathy in the form of hyperactive delirium.  She was admitted to the hospital on August 18 and treated with IV Rocephin for UTI.  She is discharged to skilled nursing facility to work with physical and Occupational Therapy  Pertinent Physical Exam At Time of Discharge  Physical Exam  Constitutional:       General: She is not in acute distress.  Comments: Awake.  Oriented only to self.    Mouth/Throat:      Pharynx: Oropharynx is clear.   Eyes:      Pupils: Pupils are equal, round, and reactive to light.   Cardiovascular:      Rate and Rhythm: Normal rate and regular rhythm.      Heart sounds: Normal heart sounds.   Pulmonary:      Effort: No respiratory distress.      Breath  sounds: Normal breath sounds. No wheezing or rhonchi.   Abdominal:      General: Abdomen is flat. Bowel sounds are normal. There is no distension.      Palpations: Abdomen is soft.      Tenderness: There is no abdominal tenderness.   Musculoskeletal:         General: No swelling.   Skin:     General: Skin is warm and dry.   Neurological:      General: No focal deficit present.   Outpatient Follow-Up  Future Appointments   Date Time Provider Department Center   11/18/2024 10:00 AM Abdiel Gutierrez MD [id number]     Follow-up with PCP in 1 week    SHELL Arellano-CNP

## 2024-08-21 NOTE — CARE PLAN
Problem: Discharge Planning  Goal: Discharge to home or other facility with appropriate resources  Outcome: Progressing     Problem: Chronic Conditions and Co-morbidities  Goal: Patient's chronic conditions and co-morbidity symptoms are monitored and maintained or improved  Outcome: Progressing     Problem: Fall/Injury  Goal: Not fall by end of shift  Outcome: Progressing  Goal: Be free from injury by end of the shift  Outcome: Progressing  Goal: Verbalize understanding of personal risk factors for fall in the hospital  Outcome: Progressing  Goal: Verbalize understanding of risk factor reduction measures to prevent injury from fall in the home  Outcome: Progressing  Goal: Use assistive devices by end of the shift  Outcome: Progressing  Goal: Pace activities to prevent fatigue by end of the shift  Outcome: Progressing     Problem: Skin  Goal: Decreased wound size/increased tissue granulation at next dressing change  Outcome: Progressing  Flowsheets (Taken 8/21/2024 1054)  Decreased wound size/increased tissue granulation at next dressing change: Promote sleep for wound healing  Goal: Participates in plan/prevention/treatment measures  Outcome: Progressing  Flowsheets (Taken 8/21/2024 1054)  Participates in plan/prevention/treatment measures:   Elevate heels   Increase activity/out of bed for meals  Goal: Prevent/manage excess moisture  Outcome: Progressing  Flowsheets (Taken 8/21/2024 1054)  Prevent/manage excess moisture:   Cleanse incontinence/protect with barrier cream   Monitor for/manage infection if present   Moisturize dry skin  Goal: Prevent/minimize sheer/friction injuries  Outcome: Progressing  Flowsheets (Taken 8/21/2024 1054)  Prevent/minimize sheer/friction injuries:   Complete micro-shifts as needed if patient unable. Adjust patient position to relieve pressure points, not a full turn   Increase activity/out of bed for meals   Use pull sheet   HOB 30 degrees or less   Turn/reposition every 2 hours/use  positioning/transfer devices  Goal: Promote/optimize nutrition  Outcome: Progressing  Flowsheets (Taken 8/21/2024 1054)  Promote/optimize nutrition:   Assist with feeding   Monitor/record intake including meals   Consume > 50% meals/supplements   Offer water/supplements/favorite foods  Goal: Promote skin healing  Outcome: Progressing  Flowsheets (Taken 8/21/2024 1054)  Promote skin healing:   Assess skin/pad under line(s)/device(s)   Turn/reposition every 2 hours/use positioning/transfer devices    The clinical goals for the shift include fall prevention, maintain comfort

## 2024-08-21 NOTE — PROGRESS NOTES
Physical Therapy    Physical Therapy Treatment    Patient Name: Dinah Parmar  MRN: [id number]  Today's Date: 8/21/2024  Time Calculation  Start Time: 0744  Stop Time: 0810  Time Calculation (min): 26 min     301/301-A    Assessment/Plan   PT Assessment  PT Assessment Results: Decreased strength, Impaired balance, Decreased mobility, Decreased cognition, Impaired judgement, Decreased safety awareness  Rehab Prognosis: Good  Evaluation/Treatment Tolerance: Patient tolerated treatment well  Medical Staff Made Aware: Yes  Strengths: Premorbid level of function, Support of Caregivers  Barriers to Participation: Comorbidities, Housing layout  End of Session Communication: PCT/NA/CTA  Assessment Comment: Pt requiring frequent verbal and tactile cues for safety during mobility this date. She demonstrates difficulty with ww management requiring min A during ambulation and transfers. Pt will continue to benefit from PT to address mobility deficits.  End of Session Patient Position: Up in chair, Alarm on  PT Plan  Inpatient/Swing Bed or Outpatient: Inpatient  PT Plan  Treatment/Interventions: Transfer training, Gait training, Stair training, Balance training, Neuromuscular re-education, Strengthening, Therapeutic exercise, Therapeutic activity, Home exercise program  PT Plan: Ongoing PT  PT Frequency: 5 times per week  PT Discharge Recommendations: Moderate intensity level of continued care  PT Recommended Transfer Status: Contact guard, Assist x1  PT - OK to Discharge:  (Yes from PT perspective with mobility)    Current Problem:  Patient Active Problem List   Diagnosis    Kyphosis (acquired) (postural)    Vaginal dryness    Vascular dementia without behavioral disturbance (Multi)    Age-related osteoporosis without current pathological fracture    Urge incontinence of urine    Weakness    Generalized weakness       General Visit Information:   PT  Visit  PT Received On: 08/21/24  General  Referred By: Jl Mcintyre  Family/Caregiver  Present: No  Co-Treatment: OT  Co-Treatment Reason: to maximize pt safety  Patient Position Received: Up in chair, Alarm on  General Comment: pt awake in chair upon arrival.  pt remaints confused due to dementia yet pleasant and cooperative  Subjective     Precautions:  Precautions  Medical Precautions: Fall precautions    Vital Signs:     Objective     Pain:  Pain Assessment  Pain Assessment: 0-10  0-10 (Numeric) Pain Score: 0 - No pain    Cognition:  Cognition  Overall Cognitive Status: Impaired at baseline  Orientation Level: Disoriented to person, Disoriented to situation, Disoriented to time, Disoriented to place  Processing Speed: Delayed        Activity Tolerance:  Activity Tolerance  Endurance: Endurance does not limit participation in activity    Treatments:  Therapeutic Exercise  Therapeutic Exercise Performed: No        Bed Mobility  Bed Mobility: No  Ambulation/Gait Training  Ambulation/Gait Training Performed: Yes  Ambulation/Gait Training 1  Surface 1: Level tile  Device 1: Rolling walker  Gait Support Devices: Gait belt  Assistance 1: Minimum assistance  Quality of Gait 1: Narrow base of support  Comments/Distance (ft) 1: Pt ambulates ~75' with FWW and min A x1. She has difficulty managing FWW and requires tactile cues to prevent her from steering FWW into the wall. Tends to veer towards her L. She demonstrates wide turns in hallway, again requiring PT assist to manage FWW.  Transfers  Transfer: Yes  Transfer 1  Technique 1: Sit to stand, Stand to sit  Transfer Device 1: Gait belt (FWW)  Transfer Level of Assistance 1: Minimum assistance, Moderate verbal cues  Trials/Comments 1: Tactile cues for hand placement with FWW. Cues to scoot forward in chair. Difficulty sequencing steps to perform stand > sit transfer, requiring PT to move chair behind patient as pt has difficulty turning with FWW.          Outcome Measures:     [facility] Basic Mobility  Turning from your back to your side while in a flat bed  without using bedrails: A little  Moving from lying on your back to sitting on the side of a flat bed without using bedrails: A little  Moving to and from bed to chair (including a wheelchair): A little  Standing up from a chair using your arms (e.g. wheelchair or bedside chair): A little  To walk in hospital room: A little  Climbing 3-5 steps with railing: Total  Basic Mobility - Total Score: 16                                      Education Documentation  Mobility Training, taught by Candace Delgado PT at 8/21/2024 11:52 AM.  Learner: Patient  Readiness: Acceptance  Method: Explanation  Response: Needs Reinforcement  Comment: Educated pt on safe FWW use           EDUCATION:     Encounter Problems       Encounter Problems (Active)       Mobility       STG - Patient will navigate 4 stairs B rails for safety after discharge.  (Progressing)       Start:  08/18/24    Expected End:  09/01/24            STG - Patient will ambulate 50 ft with least restrictive device for improved safety and reduce risk of falling.  (Progressing)       Start:  08/18/24    Expected End:  09/01/24               PT Transfers       STG - Patient will transfer sit to and from [facility] for reduced caregiver burden.  (Progressing)       Start:  08/18/24    Expected End:  09/01/24               Pain - Adult

## 2024-08-21 NOTE — CARE PLAN
The patient's goals for the shift include get some sleep    The clinical goals for the shift include Safety- fall prevention, hourly rounding, comfort    Over the shift, the patient did not make progress toward the following goals. Barriers to progression include delirium.  Recommendations to address these barriers include reorient patient.     Problem: Discharge Planning  Goal: Discharge to home or other facility with appropriate resources  Outcome: Progressing     Problem: Chronic Conditions and Co-morbidities  Goal: Patient's chronic conditions and co-morbidity symptoms are monitored and maintained or improved  Outcome: Progressing  Flowsheets (Taken 8/20/2024 5244)  Care Plan - Patient's Chronic Conditions and Co-Morbidity Symptoms are Monitored and Maintained or Improved: Monitor and assess patient's chronic conditions and comorbid symptoms for stability, deterioration, or improvement     Problem: Fall/Injury  Goal: Not fall by end of shift  Outcome: Progressing  Goal: Be free from injury by end of the shift  Outcome: Progressing  Goal: Verbalize understanding of personal risk factors for fall in the hospital  Outcome: Progressing  Goal: Verbalize understanding of risk factor reduction measures to prevent injury from fall in the home  Outcome: Progressing  Goal: Use assistive devices by end of the shift  Outcome: Progressing  Goal: Pace activities to prevent fatigue by end of the shift  Outcome: Progressing     Problem: Skin  Goal: Decreased wound size/increased tissue granulation at next dressing change  Flowsheets (Taken 8/20/2024 9342)  Decreased wound size/increased tissue granulation at next dressing change: Promote sleep for wound healing

## 2024-08-21 NOTE — PROGRESS NOTES
Occupational Therapy    OT Treatment    Patient Name: Dinah Parmar  MRN: [id number]  Today's Date: 8/21/2024  Time Calculation  Start Time: 0743  Stop Time: 0810  Time Calculation (min): 27 min        Assessment:  OT Assessment: pt continues to need assist for most ADLs and mobility.  recommend to continue OT POC and current goals  End of Session Communication: PCT/NA/CTA  End of Session Patient Position: Up in chair, Alarm on     Plan:  Treatment Interventions: ADL retraining, Visual perceptual retraining, Functional transfer training  OT Frequency: 3 times per week  OT Discharge Recommendations: Moderate intensity level of continued care  OT Recommended Transfer Status: Assist of 1  Treatment Interventions: ADL retraining, Visual perceptual retraining, Functional transfer training    Subjective   Previous Visit Info:  OT Last Visit  OT Received On: 08/21/24  General:  General  Referred By: Jl Mcintyre  Family/Caregiver Present: No  Co-Treatment: PT  Co-Treatment Reason: to maximize pt safety  Patient Position Received: Up in chair, Alarm on  General Comment: pt awake in chair upon arrival.  pt remaints confused due to dementia yet pleasant and cooperative  Precautions:  Medical Precautions: Fall precautions    Pain:  Pain Assessment  Pain Assessment: 0-10  0-10 (Numeric) Pain Score: 0 - No pain    Objective    Cognition:  Cognition  Overall Cognitive Status: Impaired at baseline  Orientation Level: Disoriented to person, Disoriented to situation, Disoriented to time, Disoriented to place  Coordination:     Activities of Daily Living: Grooming  Grooming Level of Assistance: Minimum assistance  Grooming Where Assessed: Standing sinkside  Grooming Comments: pt particpate in standing at sink to wash hands.  pt able to initiate task with moderate prompts.  hand over hand to intiate to wash LUE.   pt needs cues for attention to task and sequencing.  Functional Standing Tolerance:     Bed Mobility/Transfers:      Transfer  1  Technique 1: Sit to stand, Stand to sit  Transfer Device 1: Walker, Gait belt  Transfer Level of Assistance 1: Minimum assistance, Moderate verbal cues  Trials/Comments 1: some hand over hand for correct hand placement      Functional Mobility:  Functional Mobility  Functional Mobility Performed: Yes  Functional Mobility 1  Surface 1: Level tile  Device 1: Rolling walker  Functional Mobility Support Devices: Gait belt  Assistance 1: Minimum assistance  Comments 1: ambulation to sink with FWW.  pt leaning more to right this date.   pt needs assist to move FWW especially with turns.  moderate cues for technique and safety. Pt needs extreme extra time      Outcome Measures:[facility] Daily Activity  Putting on and taking off regular lower body clothing: A lot  Bathing (including washing, rinsing, drying): A lot  Putting on and taking off regular upper body clothing: A lot  Toileting, which includes using toilet, bedpan or urinal: A lot  Taking care of personal grooming such as brushing teeth: A little  Eating Meals: A little  Daily Activity - Total Score: 14        Education Documentation  Body Mechanics, taught by Lindsay Melchor OT at 8/21/2024  8:36 AM.  Learner: Patient  Readiness: Acceptance  Method: Explanation, Demonstration  Response: Needs Reinforcement, Demonstrated Understanding    ADL Training, taught by Lindsay Melchor OT at 8/21/2024  8:36 AM.  Learner: Patient  Readiness: Acceptance  Method: Explanation, Demonstration  Response: Needs Reinforcement, Demonstrated Understanding    Education Comments  No comments found.        OP EDUCATION:       Goals:  Encounter Problems       Encounter Problems (Active)       ADLs       Patient will perform UB bathing  with supervision level of assistance. (Progressing)       Start:  08/19/24    Expected End:  09/02/24            Patient with complete lower body dressing with contact guard assist level of assistance  (Progressing)       Start:  08/19/24    Expected End:   09/02/24            Patient will complete toileting including hygiene clothing management/hygiene with contact guard assist level of assistance (Progressing)       Start:  08/19/24    Expected End:  09/02/24               BALANCE       Pt will maintain dynamic standing balance during ADL task with contact guard assist level of assistance in order to demonstrate decreased risk of falling and improved postural control. (Progressing)       Start:  08/19/24    Expected End:  09/02/24

## 2024-08-22 VITALS
WEIGHT: 105.38 LBS | DIASTOLIC BLOOD PRESSURE: 70 MMHG | HEIGHT: 59 IN | BODY MASS INDEX: 21.24 KG/M2 | HEART RATE: 95 BPM | SYSTOLIC BLOOD PRESSURE: 165 MMHG | OXYGEN SATURATION: 95 % | TEMPERATURE: 98.1 F | RESPIRATION RATE: 17 BRPM

## 2024-08-22 PROBLEM — R53.1 WEAKNESS: Status: RESOLVED | Noted: 2024-08-18 | Resolved: 2024-08-22

## 2024-08-22 PROCEDURE — 97530 THERAPEUTIC ACTIVITIES: CPT | Mod: GO

## 2024-08-22 PROCEDURE — 99232 SBSQ HOSP IP/OBS MODERATE 35: CPT | Performed by: NURSE PRACTITIONER

## 2024-08-22 PROCEDURE — G0378 HOSPITAL OBSERVATION PER HR: HCPCS

## 2024-08-22 PROCEDURE — 2500000001 HC RX 250 WO HCPCS SELF ADMINISTERED DRUGS (ALT 637 FOR MEDICARE OP): Performed by: INTERNAL MEDICINE

## 2024-08-22 RX ADMIN — MEMANTINE 10 MG: 10 TABLET ORAL at 09:29

## 2024-08-22 ASSESSMENT — COGNITIVE AND FUNCTIONAL STATUS - GENERAL
TOILETING: A LOT
EATING MEALS: A LITTLE
HELP NEEDED FOR BATHING: A LOT
PERSONAL GROOMING: A LITTLE
DRESSING REGULAR UPPER BODY CLOTHING: A LITTLE
DRESSING REGULAR LOWER BODY CLOTHING: A LOT
DAILY ACTIVITIY SCORE: 15

## 2024-08-22 ASSESSMENT — PAIN SCALES - PAIN ASSESSMENT IN ADVANCED DEMENTIA (PAINAD)
TOTALSCORE: 0
CONSOLABILITY: NO NEED TO CONSOLE
BREATHING: NORMAL
BODYLANGUAGE: RELAXED
FACIALEXPRESSION: SMILING OR INEXPRESSIVE

## 2024-08-22 ASSESSMENT — PAIN SCALES - GENERAL
PAINLEVEL_OUTOF10: 0 - NO PAIN
PAINLEVEL_OUTOF10: 0 - NO PAIN

## 2024-08-22 NOTE — CARE PLAN
Problem: Discharge Planning  Goal: Discharge to home or other facility with appropriate resources  Outcome: Progressing     Problem: Chronic Conditions and Co-morbidities  Goal: Patient's chronic conditions and co-morbidity symptoms are monitored and maintained or improved  Outcome: Progressing     Problem: Fall/Injury  Goal: Not fall by end of shift  Outcome: Progressing  Goal: Be free from injury by end of the shift  Outcome: Progressing  Goal: Verbalize understanding of personal risk factors for fall in the hospital  Outcome: Progressing  Goal: Verbalize understanding of risk factor reduction measures to prevent injury from fall in the home  Outcome: Progressing  Goal: Use assistive devices by end of the shift  Outcome: Progressing  Goal: Pace activities to prevent fatigue by end of the shift  Outcome: Progressing     Problem: Skin  Goal: Decreased wound size/increased tissue granulation at next dressing change  Outcome: Progressing  Flowsheets (Taken 8/22/2024 0925)  Decreased wound size/increased tissue granulation at next dressing change: Promote sleep for wound healing  Goal: Participates in plan/prevention/treatment measures  Outcome: Progressing  Flowsheets (Taken 8/22/2024 0925)  Participates in plan/prevention/treatment measures:   Elevate heels   Increase activity/out of bed for meals  Goal: Prevent/manage excess moisture  Outcome: Progressing  Flowsheets (Taken 8/22/2024 0925)  Prevent/manage excess moisture:   Cleanse incontinence/protect with barrier cream   Monitor for/manage infection if present   Follow provider orders for dressing changes  Goal: Prevent/minimize sheer/friction injuries  Outcome: Progressing  Flowsheets (Taken 8/22/2024 0925)  Prevent/minimize sheer/friction injuries:   Complete micro-shifts as needed if patient unable. Adjust patient position to relieve pressure points, not a full turn   Use pull sheet   HOB 30 degrees or less   Turn/reposition every 2 hours/use  positioning/transfer devices  Goal: Promote/optimize nutrition  Outcome: Progressing  Flowsheets (Taken 8/22/2024 0925)  Promote/optimize nutrition:   Consume > 50% meals/supplements   Assist with feeding   Offer water/supplements/favorite foods  Goal: Promote skin healing  Outcome: Progressing  Flowsheets (Taken 8/22/2024 0925)  Promote skin healing:   Assess skin/pad under line(s)/device(s)   Protective dressings over bony prominences   Turn/reposition every 2 hours/use positioning/transfer devices    The clinical goals for the shift include maintain pt safety, remain fall free

## 2024-08-22 NOTE — PROGRESS NOTES
Occupational Therapy    OT Treatment    Patient Name: Dinah Parmar  MRN: [id number]  Today's Date: 8/22/2024  Time Calculation  Start Time: 1129  Stop Time: 1147  Time Calculation (min): 18 min        Assessment:  OT Assessment: pt is making progress in all areas and now able to complete tasks with less tactile/hand over hand cues.   continue with OT POC  End of Session Patient Position: Up in chair, Alarm on     Plan:  Treatment Interventions: ADL retraining, Visual perceptual retraining, Functional transfer training  OT Frequency: 3 times per week  OT Discharge Recommendations: Moderate intensity level of continued care  OT Recommended Transfer Status: Assist of 1  Treatment Interventions: ADL retraining, Visual perceptual retraining, Functional transfer training    Subjective   Previous Visit Info:  OT Last Visit  OT Received On: 08/22/24  General:  General  Family/Caregiver Present: No  Patient Position Received: Up in chair, Alarm on  General Comment: pt awake upon arrival.  pt confused but able to follow simple commands with extra time and repitition  Precautions:  Medical Precautions: Fall precautions    Pain:  Pain Assessment  Pain Assessment: 0-10  0-10 (Numeric) Pain Score: 0 - No pain    Objective    Cognition:  Cognition  Overall Cognitive Status: Impaired at baseline       Therapy/Activity: Therapeutic Activity  Therapeutic Activity 1: sit to stand transfer training.  first trial min A.  second trial CGA.  pt has improved ability to follow verbal commands and not needing any tactile cues this date.  Therapeutic Activity 2: standing balance tasks incorporating reaching and dynamic balance.  reaching across midline.   pt leaning right yet able to correct with tactile cues.      Outcome Measures:[facility] Daily Activity  Putting on and taking off regular lower body clothing: A lot  Bathing (including washing, rinsing, drying): A lot  Putting on and taking off regular upper body clothing: A little  Toileting, which  includes using toilet, bedpan or urinal: A lot  Taking care of personal grooming such as brushing teeth: A little  Eating Meals: A little  Daily Activity - Total Score: 15        Education Documentation  Body Mechanics, taught by Lindsay Melchor OT at 8/22/2024 11:54 AM.  Learner: Patient  Readiness: Eager  Method: Explanation, Demonstration  Response: Needs Reinforcement, Demonstrated Understanding    ADL Training, taught by Lindsay Melchor OT at 8/22/2024 11:54 AM.  Learner: Patient  Readiness: Eager  Method: Explanation, Demonstration  Response: Needs Reinforcement, Demonstrated Understanding    Education Comments  No comments found.        OP EDUCATION:       Goals:  Encounter Problems       Encounter Problems (Active)       ADLs       Patient will perform UB bathing  with supervision level of assistance. (Progressing)       Start:  08/19/24    Expected End:  09/02/24            Patient with complete lower body dressing with contact guard assist level of assistance  (Progressing)       Start:  08/19/24    Expected End:  09/02/24            Patient will complete toileting including hygiene clothing management/hygiene with contact guard assist level of assistance (Progressing)       Start:  08/19/24    Expected End:  09/02/24               BALANCE       Pt will maintain dynamic standing balance during ADL task with contact guard assist level of assistance in order to demonstrate decreased risk of falling and improved postural control. (Progressing)       Start:  08/19/24    Expected End:  09/02/24

## 2024-08-22 NOTE — PROGRESS NOTES
Physical Therapy                 Therapy Communication Note    Patient Name: Dinah Parmar  MRN: [id number]  Today's Date: 8/22/2024     Discipline: Physical Therapy    Missed Visit Reason: Missed Visit Reason: Patient refused (States she's waiting on , suppose to be discharged today.)    Missed Time: Attempt

## 2024-08-22 NOTE — PROGRESS NOTES
Adrian Parmar is a 83 y.o. female on day 4 of admission presenting with Weakness.      Subjective   Patient sitting up in chair eating breakfast.  Awake and oriented to self only.  She is pleasant and appears comfortable.  Denies any complaints.  Awaiting insurance approval to transition to SNF.       Objective     Last Recorded Vitals  /71 (BP Location: Left arm, Patient Position: Sitting)   Pulse 86   Temp 36.5 °C (97.7 °F) (Temporal)   Resp 17   Wt 47.8 kg (105 lb 6.1 oz)   SpO2 94%   Intake/Output last 3 Shifts:    Intake/Output Summary (Last 24 hours) at 8/22/2024 1042  Last data filed at 8/22/2024 0807  Gross per 24 hour   Intake 480 ml   Output 175 ml   Net 305 ml       Admission Weight  Weight: 49 kg (108 lb) (08/17/24 2254)    Daily Weight  08/18/24 : 47.8 kg (105 lb 6.1 oz)    Image Results  XR tibia and fibia  MRN: [id number]  Patient Name: ADRIAN PARMAR     STUDY:  TIBIA ; Right;  8/24/2022 8:10 pm     INDICATION:  wound .     COMPARISON:  None.     ACCESSION NUMBER(S):  [id number]     ORDERING CLINICIAN:  CINDY ABDUL     FINDINGS:  No osseous destruction or erosive change. No acute fractures or  malalignment. The bones are osteopenic. No soft tissue gas or  radiopaque foreign bodies.     IMPRESSION:  No soft tissue gas or radiographic evidence of osteomyelitis.         Physical Exam  Constitutional:       General: She is not in acute distress.     Appearance: She is ill-appearing.      Comments: Awake.  Oriented only to self.  Not engaging in any meaningful conversation.  Not following verbal commands.  Restless and agitated.   HENT:      Mouth/Throat:      Pharynx: Oropharynx is clear.   Eyes:      Pupils: Pupils are equal, round, and reactive to light.   Cardiovascular:      Rate and Rhythm: Normal rate and regular rhythm.      Heart sounds: Normal heart sounds.   Pulmonary:      Effort: No respiratory distress.      Breath sounds: Normal breath sounds. No wheezing or rhonchi.   Abdominal:       General: Abdomen is flat. Bowel sounds are normal. There is no distension.      Palpations: Abdomen is soft.      Tenderness: There is no abdominal tenderness.   Musculoskeletal:         General: No swelling.   Skin:     General: Skin is warm and dry.   Neurological:      General: No focal deficit present.       Assessment/Plan    83-year-old female with a past medical history of vascular dementia, pelvic fracture, urinary tract infection, insomnia, incontinence to urine, who presented to the emergency room for generalized weakness and fatigue associated with the fall. Workup in the emergency room showed findings consistent with urinary tract infection associated with an acute metabolic encephalopathy in the form of hyperactive delirium.     Assessment & Plan  Weakness    Generalized weakness      Weakness  UTI  Metabolic encephalopathy in the setting of history of vascular dementia     Plan:  -IV Rocephin completed  -Continue home dose Namenda  -PT/OT evaluation and treatment completed     Full code     Discharge disposition: Awaiting pre-CERT to SNF.               Alyce Chaves, APRN-CNP

## 2024-08-26 ENCOUNTER — TELEPHONE (OUTPATIENT)
Dept: PRIMARY CARE | Facility: CLINIC | Age: 84
End: 2024-08-26
Payer: MEDICARE

## 2024-08-26 NOTE — TELEPHONE ENCOUNTER
Patient  called in wanting to let you know that patient is at the [facility] after having a fall

## 2024-09-03 PROBLEM — E55.9 VITAMIN D DEFICIENCY: Status: ACTIVE | Noted: 2024-08-23
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
## 2024-09-03 PROBLEM — M54.50 ACUTE LOW BACK PAIN: Status: ACTIVE | Noted: 2024-09-03
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
## 2024-09-03 PROBLEM — M25.559 ARTHRALGIA OF HIP: Status: ACTIVE | Noted: 2024-09-03

## 2024-09-03 PROBLEM — M89.8X1 PAIN OF RIGHT CLAVICLE: Status: ACTIVE | Noted: 2024-08-30

## 2024-09-05 RX ORDER — ASPIRIN 325 MG
325 TABLET ORAL 2 TIMES DAILY
COMMUNITY

## 2024-09-07 ENCOUNTER — APPOINTMENT (OUTPATIENT)
Dept: RADIOLOGY | Facility: HOSPITAL | Age: 84
End: 2024-09-07
Payer: MEDICARE

## 2024-09-07 ENCOUNTER — HOSPITAL ENCOUNTER (OUTPATIENT)
Dept: CARDIOLOGY | Facility: HOSPITAL | Age: 84
Discharge: HOME | End: 2024-09-07
Payer: MEDICARE

## 2024-09-07 ENCOUNTER — HOSPITAL ENCOUNTER (EMERGENCY)
Facility: HOSPITAL | Age: 84
Discharge: HOME | End: 2024-09-07
Payer: MEDICARE

## 2024-09-07 VITALS
WEIGHT: 111 LBS | TEMPERATURE: 97.2 F | BODY MASS INDEX: 21.79 KG/M2 | OXYGEN SATURATION: 96 % | HEIGHT: 60 IN | RESPIRATION RATE: 14 BRPM | SYSTOLIC BLOOD PRESSURE: 117 MMHG | HEART RATE: 74 BPM | DIASTOLIC BLOOD PRESSURE: 42 MMHG
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
## 2024-09-07 DIAGNOSIS — N39.0 URINARY TRACT INFECTION WITHOUT HEMATURIA, SITE UNSPECIFIED: ICD-10-CM

## 2024-09-07 LAB
ALBUMIN SERPL BCP-MCNC: 3.6 G/DL (ref 3.4–5)
ALP SERPL-CCNC: 214 U/L (ref 33–136)
ALT SERPL W P-5'-P-CCNC: 12 U/L (ref 7–45)
ANION GAP SERPL CALC-SCNC: 10 MMOL/L (ref 10–20)
APPEARANCE UR: ABNORMAL
AST SERPL W P-5'-P-CCNC: 27 U/L (ref 9–39)
BACTERIA #/AREA URNS AUTO: ABNORMAL /HPF
BILIRUB SERPL-MCNC: 1.5 MG/DL (ref 0–1.2)
BILIRUB UR STRIP.AUTO-MCNC: NEGATIVE MG/DL
BNP SERPL-MCNC: 145 PG/ML (ref 0–99)
BUN SERPL-MCNC: 9 MG/DL (ref 6–23)
CALCIUM SERPL-MCNC: 9.1 MG/DL (ref 8.6–10.3)
CARDIAC TROPONIN I PNL SERPL HS: 21 NG/L (ref 0–13)
CARDIAC TROPONIN I PNL SERPL HS: 22 NG/L (ref 0–13)
CHLORIDE SERPL-SCNC: 101 MMOL/L (ref 98–107)
CK SERPL-CCNC: 130 U/L (ref 0–215)
CO2 SERPL-SCNC: 27 MMOL/L (ref 21–32)
COLOR UR: YELLOW
CREAT SERPL-MCNC: 0.53 MG/DL (ref 0.5–1.05)
EGFRCR SERPLBLD CKD-EPI 2021: >90 ML/MIN/1.73M*2
ERYTHROCYTE [DISTWIDTH] IN BLOOD BY AUTOMATED COUNT: 13.8 % (ref 11.5–14.5)
GLUCOSE SERPL-MCNC: 115 MG/DL (ref 74–99)
GLUCOSE UR STRIP.AUTO-MCNC: NORMAL MG/DL
HCT VFR BLD AUTO: 28.3 % (ref 36–46)
HGB BLD-MCNC: 9.1 G/DL (ref 12–16)
KETONES UR STRIP.AUTO-MCNC: NEGATIVE MG/DL
LEUKOCYTE ESTERASE UR QL STRIP.AUTO: ABNORMAL
MCH RBC QN AUTO: 32.2 PG (ref 26–34)
MCHC RBC AUTO-ENTMCNC: 32.2 G/DL (ref 32–36)
MCV RBC AUTO: 100 FL (ref 80–100)
MUCOUS THREADS #/AREA URNS AUTO: ABNORMAL /LPF
NITRITE UR QL STRIP.AUTO: NEGATIVE
NRBC BLD-RTO: 0 /100 WBCS (ref 0–0)
PH UR STRIP.AUTO: 7 [PH]
PLATELET # BLD AUTO: 641 X10*3/UL (ref 150–450)
POTASSIUM SERPL-SCNC: 3.9 MMOL/L (ref 3.5–5.3)
PROT SERPL-MCNC: 6.6 G/DL (ref 6.4–8.2)
PROT UR STRIP.AUTO-MCNC: NEGATIVE MG/DL
RBC # BLD AUTO: 2.83 X10*6/UL (ref 4–5.2)
RBC # UR STRIP.AUTO: NEGATIVE /UL
RBC #/AREA URNS AUTO: ABNORMAL /HPF
SODIUM SERPL-SCNC: 134 MMOL/L (ref 136–145)
SP GR UR STRIP.AUTO: 1.01
SQUAMOUS #/AREA URNS AUTO: ABNORMAL /HPF
UROBILINOGEN UR STRIP.AUTO-MCNC: ABNORMAL MG/DL
WBC # BLD AUTO: 12.8 X10*3/UL (ref 4.4–11.3)
WBC #/AREA URNS AUTO: ABNORMAL /HPF

## 2024-09-07 PROCEDURE — 96365 THER/PROPH/DIAG IV INF INIT: CPT

## 2024-09-07 PROCEDURE — 96375 TX/PRO/DX INJ NEW DRUG ADDON: CPT

## 2024-09-07 PROCEDURE — 2500000004 HC RX 250 GENERAL PHARMACY W/ HCPCS (ALT 636 FOR OP/ED): Performed by: PHYSICIAN ASSISTANT

## 2024-09-07 PROCEDURE — 83880 ASSAY OF NATRIURETIC PEPTIDE: CPT | Performed by: PHYSICIAN ASSISTANT

## 2024-09-07 PROCEDURE — 80053 COMPREHEN METABOLIC PANEL: CPT | Performed by: PHYSICIAN ASSISTANT

## 2024-09-07 PROCEDURE — 81003 URINALYSIS AUTO W/O SCOPE: CPT | Performed by: PHYSICIAN ASSISTANT

## 2024-09-07 PROCEDURE — 99285 EMERGENCY DEPT VISIT HI MDM: CPT | Mod: 25

## 2024-09-07 PROCEDURE — 36415 COLL VENOUS BLD VENIPUNCTURE: CPT | Performed by: PHYSICIAN ASSISTANT

## 2024-09-07 PROCEDURE — 93005 ELECTROCARDIOGRAM TRACING: CPT

## 2024-09-07 PROCEDURE — 82550 ASSAY OF CK (CPK): CPT | Performed by: PHYSICIAN ASSISTANT

## 2024-09-07 PROCEDURE — 85027 COMPLETE CBC AUTOMATED: CPT | Performed by: PHYSICIAN ASSISTANT

## 2024-09-07 PROCEDURE — 70450 CT HEAD/BRAIN W/O DYE: CPT | Performed by: RADIOLOGY

## 2024-09-07 PROCEDURE — 84484 ASSAY OF TROPONIN QUANT: CPT | Performed by: PHYSICIAN ASSISTANT

## 2024-09-07 PROCEDURE — 73502 X-RAY EXAM HIP UNI 2-3 VIEWS: CPT | Mod: RT

## 2024-09-07 PROCEDURE — 71045 X-RAY EXAM CHEST 1 VIEW: CPT | Performed by: RADIOLOGY

## 2024-09-07 PROCEDURE — 70450 CT HEAD/BRAIN W/O DYE: CPT

## 2024-09-07 PROCEDURE — 87086 URINE CULTURE/COLONY COUNT: CPT | Mod: SAMLAB | Performed by: PHYSICIAN ASSISTANT

## 2024-09-07 PROCEDURE — 71045 X-RAY EXAM CHEST 1 VIEW: CPT

## 2024-09-07 PROCEDURE — 73502 X-RAY EXAM HIP UNI 2-3 VIEWS: CPT | Mod: RIGHT SIDE | Performed by: RADIOLOGY

## 2024-09-07 RX ORDER — CEFTRIAXONE 1 G/50ML
1 INJECTION, SOLUTION INTRAVENOUS ONCE
Status: COMPLETED | OUTPATIENT
Start: 2024-09-07 | End: 2024-09-07

## 2024-09-07 RX ORDER — VITAMIN E MIXED 400 UNIT
1000 CAPSULE ORAL DAILY
COMMUNITY

## 2024-09-07 RX ORDER — FERROUS SULFATE 325(65) MG
325 TABLET ORAL DAILY
COMMUNITY
End: 2024-10-19

## 2024-09-07 RX ORDER — MICONAZOLE NITRATE 2 G/100G
1 POWDER TOPICAL AS NEEDED
COMMUNITY
Start: 2024-09-05

## 2024-09-07 RX ORDER — PETROLATUM 0.61 G/G
1 CREAM TOPICAL AS NEEDED
COMMUNITY

## 2024-09-07 RX ORDER — POTASSIUM &MAGNESIUM ASPARTATE 250-250 MG
500 CAPSULE ORAL DAILY
COMMUNITY

## 2024-09-07 RX ORDER — MORPHINE SULFATE 4 MG/ML
4 INJECTION, SOLUTION INTRAMUSCULAR; INTRAVENOUS ONCE
Status: COMPLETED | OUTPATIENT
Start: 2024-09-07 | End: 2024-09-07

## 2024-09-07 RX ORDER — ONDANSETRON HYDROCHLORIDE 2 MG/ML
4 INJECTION, SOLUTION INTRAVENOUS ONCE
Status: COMPLETED | OUTPATIENT
Start: 2024-09-07 | End: 2024-09-07

## 2024-09-07 RX ORDER — MEMANTINE HYDROCHLORIDE 10 MG/1
10 TABLET ORAL 2 TIMES DAILY
COMMUNITY

## 2024-09-07 RX ORDER — CEPHALEXIN 500 MG/1
500 CAPSULE ORAL 4 TIMES DAILY
Qty: 20 CAPSULE | Refills: 0 | Status: SHIPPED | OUTPATIENT
Start: 2024-09-07 | End: 2024-09-12

## 2024-09-07 RX ORDER — ACETAMINOPHEN 325 MG/1
650 TABLET ORAL EVERY 6 HOURS PRN
COMMUNITY

## 2024-09-07 RX ORDER — ADHESIVE BANDAGE
30 BANDAGE TOPICAL DAILY PRN
COMMUNITY

## 2024-09-07 RX ORDER — BISMUTH SUBSALICYLATE 262 MG
1 TABLET,CHEWABLE ORAL DAILY
COMMUNITY

## 2024-09-07 RX ORDER — HYDROCODONE BITARTRATE AND ACETAMINOPHEN 5; 300 MG/1; MG/1
1 TABLET ORAL EVERY 6 HOURS PRN
COMMUNITY

## 2024-09-07 RX ORDER — ALENDRONATE SODIUM 70 MG/1
70 TABLET ORAL
COMMUNITY

## 2024-09-07 RX ADMIN — MORPHINE SULFATE 4 MG: 4 INJECTION, SOLUTION INTRAMUSCULAR; INTRAVENOUS at 18:13

## 2024-09-07 RX ADMIN — ONDANSETRON 4 MG: 2 INJECTION INTRAMUSCULAR; INTRAVENOUS at 18:13

## 2024-09-07 RX ADMIN — CEFTRIAXONE SODIUM 1 G: 1 INJECTION, SOLUTION INTRAVENOUS at 18:57

## 2024-09-07 ASSESSMENT — PAIN SCALES - GENERAL
PAINLEVEL_OUTOF10: 0 - NO PAIN
PAINLEVEL_OUTOF10: 0 - NO PAIN

## 2024-09-07 ASSESSMENT — PAIN - FUNCTIONAL ASSESSMENT: PAIN_FUNCTIONAL_ASSESSMENT: 0-10

## 2024-09-07 NOTE — ED PROVIDER NOTES
HPI   Chief Complaint   Patient presents with    Fall     Brought to ED per ELIZABETH erickson from [place] after unwitnessed fall with c/o R hip pain. She was found in a W sitting position. Is A&Ox1 which is baseline for her       Patient has dementia and cannot give useful information.  She is pleasant and cooperative.    Nursing home report is limited.  We are told that patient was found on the ground.  This was a presumed fall.  It was unwitnessed.  Patient does complain of right hip pain.  Patient had an ORIF a few days ago of the right hip.  She is currently residing in the nursing home for rehab.      History provided by:  Patient and nursing home  History limited by:  Dementia          Patient History   Past Medical History:   Diagnosis Date    Body mass index (BMI) 23.0-23.9, adult 12/21/2020    BMI 23.0-23.9, adult    Contusion of eyeball and orbital tissues, left eye, initial encounter 02/18/2021    Ecchymosis of left eye, initial encounter    Elevated blood-pressure reading, without diagnosis of hypertension 05/21/2020    Elevated blood pressure reading    Other insomnia not due to a substance or known physiological condition 05/22/2020    Situational insomnia    Personal history of other specified conditions 02/18/2021    History of memory loss    Personal history of urinary (tract) infections 05/21/2020    History of UTI     Past Surgical History:   Procedure Laterality Date    HIP FRACTURE SURGERY Right 09/02/2024    fixation of right hip fracture    OTHER SURGICAL HISTORY  05/21/2020    Hysterectomy     Family History   Problem Relation Name Age of Onset    Cancer Mother       Social History     Tobacco Use    Smoking status: Never    Smokeless tobacco: Never   Vaping Use    Vaping status: Never Used   Substance Use Topics    Alcohol use: Never    Drug use: Never       Physical Exam   ED Triage Vitals   Temperature Heart Rate Respirations BP   09/07/24 1551 09/07/24 1551 09/07/24 1551 09/07/24 1551   36.2 °C  (97.2 °F) 91 16 155/71      Pulse Ox Temp src Heart Rate Source Patient Position   09/07/24 1551 -- 09/07/24 1700 --   98 %  Monitor       BP Location FiO2 (%)     -- --             Physical Exam  Vitals and nursing note reviewed.   Constitutional:       General: She is not in acute distress.     Appearance: Normal appearance. She is well-developed, well-groomed and normal weight. She is not ill-appearing, toxic-appearing or diaphoretic.   HENT:      Head: Normocephalic and atraumatic.      Right Ear: Ear canal and external ear normal.      Left Ear: Ear canal and external ear normal.      Nose: Nose normal.      Mouth/Throat:      Lips: Pink. No lesions.      Mouth: Mucous membranes are moist.      Pharynx: Oropharynx is clear.   Eyes:      General: No scleral icterus.     Conjunctiva/sclera: Conjunctivae normal.   Cardiovascular:      Rate and Rhythm: Normal rate and regular rhythm.      Pulses:           Dorsalis pedis pulses are 2+ on the right side and 2+ on the left side.        Posterior tibial pulses are 2+ on the right side and 2+ on the left side.      Heart sounds: Normal heart sounds.   Pulmonary:      Effort: Pulmonary effort is normal.      Breath sounds: Normal breath sounds and air entry.   Chest:      Chest wall: No tenderness.   Abdominal:      General: Bowel sounds are normal. There is no distension.      Palpations: Abdomen is soft.      Tenderness: There is no abdominal tenderness. There is no right CVA tenderness, left CVA tenderness or guarding.   Musculoskeletal:      Cervical back: No tenderness. No spinous process tenderness or muscular tenderness.      Comments: Palpating right hip causes discomfort.  Right knee and ankle are without any areas of tenderness.  Left lower extremity is without any areas of tenderness.  No tenderness to palpation of upper extremities bilaterally.  No midline axial spine tenderness on exam.  No step-off deformity   Neurological:      General: No focal deficit  present.      Mental Status: She is alert. She is confused.      Cranial Nerves: No cranial nerve deficit or facial asymmetry.      Sensory: No sensory deficit.      Motor: No weakness.   Psychiatric:         Attention and Perception: Attention normal.         Mood and Affect: Mood and affect normal.         Speech: Speech normal.         Behavior: Behavior normal. Behavior is cooperative.         Cognition and Memory: Memory is impaired.           ED Course & MDM   Diagnoses as of 09/07/24 1920   Fall, initial encounter   Urinary tract infection without hematuria, site unspecified                 No data recorded     Ann Coma Scale Score: 14 (09/07/24 1705 : Cecille Salcedo, TAYLOR)                           Medical Decision Making  Patient has dementia and cannot give useful information.  She is pleasant and cooperative.    Nursing home report is limited.  We are told that patient was found on the ground.  This was a presumed fall.  It was unwitnessed.  Patient does complain of right hip pain.  Patient had an ORIF a few days ago of the right hip.  She is currently residing in the nursing home for rehab.    Ddx: Fracture, contusion, strain, sprain, syncope, cardiac, pulmonary, metabolic, other    As this was an unwitnessed fall a more lengthy workup was obtained including CK to ensure that patient was not on the ground for any significant period of time.  Patient is slightly anemic on her labs but this appears to be improvement from the most recent from her hospitalization and ORIF.  Patient has a slight leukocytosis which might also be related to her recent surgery and the stress response.  No other acute findings were noted.  Patient's pain was treated with IV morphine and Zofran.  I discussed findings with the family they were agreeable to have patient transported back to the nursing home for further evaluation and treatment and continued rehab.  Patient discharged home in improved stable condition    Problems  Addressed:  Fall, initial encounter: undiagnosed new problem with uncertain prognosis    Amount and/or Complexity of Data Reviewed  Labs: ordered. Decision-making details documented in ED Course.  Radiology: ordered and independent interpretation performed. Decision-making details documented in ED Course.  ECG/medicine tests: ordered and independent interpretation performed. Decision-making details documented in ED Course.     Details: Read by self showing normal sinus rhythm at a ventricular rate of 95 bpm.  Normal axis.  No ST segment elevation.  HI interval of 146 with a QT of 350        Procedure  Procedures     Heather Ulloa PA-C  09/07/24 1803       Heather Ulloa PA-C  09/07/24 1920

## 2024-09-08 ENCOUNTER — APPOINTMENT (OUTPATIENT)
Dept: RADIOLOGY | Facility: HOSPITAL | Age: 84
DRG: 640 | End: 2024-09-08
Payer: MEDICARE

## 2024-09-08 ENCOUNTER — HOSPITAL ENCOUNTER (EMERGENCY)
Facility: HOSPITAL | Age: 84
Discharge: HOME | DRG: 640 | End: 2024-09-09
Attending: EMERGENCY MEDICINE
Payer: MEDICARE
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
## 2024-09-08 DIAGNOSIS — S70.11XA CONTUSION OF RIGHT HIP AND THIGH, INITIAL ENCOUNTER: ICD-10-CM

## 2024-09-08 DIAGNOSIS — S70.01XA CONTUSION OF RIGHT HIP AND THIGH, INITIAL ENCOUNTER: ICD-10-CM

## 2024-09-08 PROCEDURE — 73502 X-RAY EXAM HIP UNI 2-3 VIEWS: CPT | Mod: RIGHT SIDE | Performed by: RADIOLOGY

## 2024-09-08 PROCEDURE — 73502 X-RAY EXAM HIP UNI 2-3 VIEWS: CPT | Mod: RT

## 2024-09-08 PROCEDURE — 72125 CT NECK SPINE W/O DYE: CPT | Performed by: RADIOLOGY

## 2024-09-08 PROCEDURE — 70450 CT HEAD/BRAIN W/O DYE: CPT | Performed by: RADIOLOGY

## 2024-09-08 PROCEDURE — 70450 CT HEAD/BRAIN W/O DYE: CPT

## 2024-09-08 PROCEDURE — 73552 X-RAY EXAM OF FEMUR 2/>: CPT | Mod: RT

## 2024-09-08 PROCEDURE — 73552 X-RAY EXAM OF FEMUR 2/>: CPT | Mod: RIGHT SIDE | Performed by: RADIOLOGY

## 2024-09-08 PROCEDURE — 99285 EMERGENCY DEPT VISIT HI MDM: CPT | Mod: 25

## 2024-09-08 PROCEDURE — 72125 CT NECK SPINE W/O DYE: CPT

## 2024-09-08 ASSESSMENT — VISUAL ACUITY: OU: 1

## 2024-09-08 ASSESSMENT — PAIN SCALES - WONG BAKER: WONGBAKER_NUMERICALRESPONSE: HURTS EVEN MORE

## 2024-09-08 ASSESSMENT — PAIN DESCRIPTION - PROGRESSION: CLINICAL_PROGRESSION: NOT CHANGED

## 2024-09-08 ASSESSMENT — PAIN DESCRIPTION - LOCATION: LOCATION: HIP

## 2024-09-08 ASSESSMENT — COLUMBIA-SUICIDE SEVERITY RATING SCALE - C-SSRS
1. IN THE PAST MONTH, HAVE YOU WISHED YOU WERE DEAD OR WISHED YOU COULD GO TO SLEEP AND NOT WAKE UP?: NO
6. HAVE YOU EVER DONE ANYTHING, STARTED TO DO ANYTHING, OR PREPARED TO DO ANYTHING TO END YOUR LIFE?: NO
2. HAVE YOU ACTUALLY HAD ANY THOUGHTS OF KILLING YOURSELF?: NO

## 2024-09-08 ASSESSMENT — PAIN DESCRIPTION - PAIN TYPE: TYPE: ACUTE PAIN

## 2024-09-08 ASSESSMENT — PAIN DESCRIPTION - ORIENTATION: ORIENTATION: RIGHT

## 2024-09-08 ASSESSMENT — PAIN SCALES - GENERAL: PAINLEVEL_OUTOF10: 0 - NO PAIN

## 2024-09-08 ASSESSMENT — PAIN - FUNCTIONAL ASSESSMENT: PAIN_FUNCTIONAL_ASSESSMENT: WONG-BAKER FACES

## 2024-09-09 VITALS
TEMPERATURE: 97.3 F | BODY MASS INDEX: 21.79 KG/M2 | OXYGEN SATURATION: 95 % | HEART RATE: 85 BPM | RESPIRATION RATE: 16 BRPM | DIASTOLIC BLOOD PRESSURE: 63 MMHG | SYSTOLIC BLOOD PRESSURE: 138 MMHG | WEIGHT: 111 LBS | HEIGHT: 60 IN

## 2024-09-09 LAB — BACTERIA UR CULT: ABNORMAL

## 2024-09-09 RX ORDER — ACETAMINOPHEN 325 MG/1
650 TABLET ORAL ONCE
Status: COMPLETED | OUTPATIENT
Start: 2024-09-09 | End: 2024-09-09

## 2024-09-09 NOTE — ED PROVIDER NOTES
Unwitnessed fall.  This 83-year-old white female with recent history of ORIF of right hip due to fall presents from skilled nursing facility due to an unwitnessed fall.  Per EMS the patient was found on the ground and the patient was complaining of right hip pain.  Patient apparently was seen and evaluated in the ED yesterday for similar symptoms.  Patient with history of dementia and is unable to describe to me the fall or why she fell down.  She is noted to be in a Pasha lift mat on arrival to the ED.  Patient has no complaints.      History provided by:  Patient and EMS personnel  History limited by:  Dementia   used: No         Physical Exam  Vitals and nursing note reviewed.   Constitutional:       General: She is awake.      Appearance: Normal appearance. She is normal weight.   HENT:      Head: Normocephalic and atraumatic.      Right Ear: Tympanic membrane, ear canal and external ear normal. Decreased hearing noted.      Left Ear: Tympanic membrane, ear canal and external ear normal. Decreased hearing noted.      Nose: Nose normal. No congestion or rhinorrhea.      Mouth/Throat:      Lips: Pink.      Mouth: Mucous membranes are moist.      Pharynx: Oropharynx is clear. Uvula midline. No oropharyngeal exudate or posterior oropharyngeal erythema.   Eyes:      General: Lids are normal. Vision grossly intact.         Right eye: No discharge.         Left eye: No discharge.      Extraocular Movements: Extraocular movements intact.      Conjunctiva/sclera: Conjunctivae normal.      Pupils: Pupils are equal, round, and reactive to light.   Cardiovascular:      Rate and Rhythm: Normal rate and regular rhythm.      Pulses: Normal pulses.      Heart sounds: Normal heart sounds. No murmur heard.     No friction rub. No gallop.   Pulmonary:      Effort: Pulmonary effort is normal. No respiratory distress.      Breath sounds: Normal breath sounds. No stridor. No wheezing, rhonchi or rales.   Chest:       Chest wall: No tenderness.   Abdominal:      General: Abdomen is flat. Bowel sounds are normal. There is no distension.      Palpations: Abdomen is soft. There is no mass.      Tenderness: There is no abdominal tenderness. There is no guarding or rebound.      Hernia: No hernia is present.      Comments: Patient has benign abdominal exam.   Musculoskeletal:         General: No swelling, deformity or signs of injury. Normal range of motion.      Cervical back: Full passive range of motion without pain, normal range of motion and neck supple.      Right hip: Tenderness present.      Left hip: Normal.      Right upper leg: Tenderness present.      Left upper leg: Normal.      Right lower leg: Tenderness present. No edema.      Left lower leg: Normal. No edema.      Comments: Evaluation of the right lower extremity revealed distal pulses are +2/4 and present at the dorsalis pedis and tibialis.  Cap refill less than 2 seconds in light touch sensations intact.  Patient has tenderness over the greater trochanter area is low well as the middle portion of the femur.  Patient has surgical site with staples in place without evidence of dehiscence or secondary infection consistent with recent ORIF of right hip.   Skin:     General: Skin is warm and dry.      Capillary Refill: Capillary refill takes less than 2 seconds.      Coloration: Skin is not jaundiced or pale.      Findings: No bruising, erythema, lesion or rash.      Comments: Surgical incision over her right greater troches.   Neurological:      General: No focal deficit present.      Mental Status: She is alert. Mental status is at baseline. She is disoriented.      GCS: GCS eye subscore is 4. GCS verbal subscore is 5. GCS motor subscore is 6.      Cranial Nerves: Cranial nerves 2-12 are intact. No cranial nerve deficit.      Sensory: Sensation is intact. No sensory deficit.      Motor: Motor function is intact. No weakness.      Coordination: Coordination is intact.  Coordination normal.      Deep Tendon Reflexes: Reflexes normal.   Psychiatric:         Mood and Affect: Mood normal.         Behavior: Behavior normal. Behavior is cooperative.         Thought Content: Thought content normal.         Judgment: Judgment normal.          Labs Reviewed - No data to display     CT head wo IV contrast   Final Result   No acute intracranial hemorrhage, mass effect or midline shift.        Nonspecific scattered white matter hypodensities favored to represent   sequela of small vessel ischemia.        Cervical spondylosis without acute loss of vertebral body height or   traumatic malalignment.             MACRO:   None.        Signed by: Evan Finkelstein 9/9/2024 12:00 AM   Dictation workstation:   [id number]      CT cervical spine wo IV contrast   Final Result   No acute intracranial hemorrhage, mass effect or midline shift.        Nonspecific scattered white matter hypodensities favored to represent   sequela of small vessel ischemia.        Cervical spondylosis without acute loss of vertebral body height or   traumatic malalignment.             MACRO:   None.        Signed by: Evan Finkelstein 9/9/2024 12:00 AM   Dictation workstation:   [id number]      XR hip right with pelvis when performed 2 or 3 views   Final Result   Right hip fracture with hardware in place.   Signed by Tenzin Coffman DO      XR femur right 2+ views   Final Result   Internal fixation of right intertrochanteric fracture. Knee joint   effusion.   Signed by Tenzin Coffman DO           Procedures     Medical Decision Making  Patient with history of recurrent fall after having a right ORIF of hip.  Patient's fall was unwitnessed and subsequent CT scan of the brain and cervical spine was ordered.  These were unremarkable for acute abnormality.  X-rays of the right hip and femur were performed because of patient's complaint of pain with palpation in these areas.  Patient has postoperative staples in place that  appear to be without dehiscence or secondary infection.  X-rays of the right hip and femur were unremarkable with evidence of recent ORIF.  Patient was then discharged back to the facility she resides at currently.         Diagnoses as of 09/09/24 0734   Fall, subsequent encounter   Contusion of right hip and thigh, initial encounter                    Tenzin Bush,   09/09/24 0734

## 2024-09-10 ENCOUNTER — TELEPHONE (OUTPATIENT)
Dept: PHARMACY | Facility: HOSPITAL | Age: 84
End: 2024-09-10
Payer: MEDICARE

## 2024-09-10 NOTE — PROGRESS NOTES
EDPD Note: Lab/Chart Reviewed    Reviewed Mr./Mrs./Ms. Dinah Parmar 's chart regarding a positive urine culture/result that was taken during their recent emergency room visit. The patient was transferred back to their rehab/LTC facility .Therefore, I have faxed this information to [facility] at fax number [phone] .    Urine Culture Multiple organisms present, probable contamination. Repeat culture if clinically indicated. Abnormal            Resulting Agency: [facility]     No further follow up needed from EDPD Team.     Eli Tyson  Pharmacy Resident

## 2024-09-12 ENCOUNTER — APPOINTMENT (OUTPATIENT)
Dept: RADIOLOGY | Facility: HOSPITAL | Age: 84
DRG: 640 | End: 2024-09-12
Payer: MEDICARE

## 2024-09-12 ENCOUNTER — HOSPITAL ENCOUNTER (INPATIENT)
Facility: HOSPITAL | Age: 84
Discharge: ANOTHER HOSPITAL | DRG: 640 | End: 2024-09-12
Attending: EMERGENCY MEDICINE | Admitting: HOSPITALIST
Payer: MEDICARE

## 2024-09-12 ENCOUNTER — APPOINTMENT (OUTPATIENT)
Dept: CARDIOLOGY | Facility: HOSPITAL | Age: 84
DRG: 640 | End: 2024-09-12
Payer: MEDICARE

## 2024-09-12 DIAGNOSIS — R55 SYNCOPE, UNSPECIFIED SYNCOPE TYPE: ICD-10-CM

## 2024-09-12 DIAGNOSIS — R09.89 OTHER SPECIFIED SYMPTOMS AND SIGNS INVOLVING THE CIRCULATORY AND RESPIRATORY SYSTEMS: ICD-10-CM

## 2024-09-12 DIAGNOSIS — A41.9 SEPSIS WITH ENCEPHALOPATHY WITHOUT SEPTIC SHOCK, DUE TO UNSPECIFIED ORGANISM (MULTI): Primary | ICD-10-CM

## 2024-09-12 DIAGNOSIS — M79.89 SWELLING OF BOTH LOWER EXTREMITIES: ICD-10-CM

## 2024-09-12 DIAGNOSIS — R65.20 SEPSIS WITH ENCEPHALOPATHY WITHOUT SEPTIC SHOCK, DUE TO UNSPECIFIED ORGANISM (MULTI): Primary | ICD-10-CM

## 2024-09-12 DIAGNOSIS — R60.0 LOCALIZED EDEMA: ICD-10-CM

## 2024-09-12 DIAGNOSIS — E86.0 DEHYDRATION: ICD-10-CM

## 2024-09-12 DIAGNOSIS — R10.84 GENERALIZED ABDOMINAL PAIN: ICD-10-CM

## 2024-09-12 DIAGNOSIS — R55 SYNCOPE AND COLLAPSE: ICD-10-CM

## 2024-09-12 DIAGNOSIS — G93.41 SEPSIS WITH ENCEPHALOPATHY WITHOUT SEPTIC SHOCK, DUE TO UNSPECIFIED ORGANISM (MULTI): Primary | ICD-10-CM

## 2024-09-12 DIAGNOSIS — E87.6 HYPOKALEMIA: ICD-10-CM

## 2024-09-12 DIAGNOSIS — D64.9 ANEMIA, UNSPECIFIED TYPE: ICD-10-CM

## 2024-09-12 DIAGNOSIS — R19.7 DIARRHEA, UNSPECIFIED TYPE: ICD-10-CM

## 2024-09-12 DIAGNOSIS — R60.9 SWELLING: ICD-10-CM

## 2024-09-12 DIAGNOSIS — I50.20 UNSPECIFIED SYSTOLIC (CONGESTIVE) HEART FAILURE: ICD-10-CM

## 2024-09-12 LAB
ALBUMIN SERPL BCP-MCNC: 2.7 G/DL (ref 3.4–5)
ALP SERPL-CCNC: 186 U/L (ref 33–136)
ALT SERPL W P-5'-P-CCNC: 7 U/L (ref 7–45)
ANION GAP SERPL CALC-SCNC: 10 MMOL/L (ref 10–20)
APTT PPP: 23 SECONDS (ref 27–38)
AST SERPL W P-5'-P-CCNC: 19 U/L (ref 9–39)
BASOPHILS # BLD AUTO: 0.03 X10*3/UL (ref 0–0.1)
BASOPHILS NFR BLD AUTO: 0.2 %
BILIRUB SERPL-MCNC: 1.4 MG/DL (ref 0–1.2)
BUN SERPL-MCNC: 20 MG/DL (ref 6–23)
C DIF TOX TCDA+TCDB STL QL NAA+PROBE: NOT DETECTED
CALCIUM SERPL-MCNC: 8.2 MG/DL (ref 8.6–10.3)
CARDIAC TROPONIN I PNL SERPL HS: 14 NG/L (ref 0–13)
CARDIAC TROPONIN I PNL SERPL HS: 17 NG/L (ref 0–13)
CHLORIDE SERPL-SCNC: 103 MMOL/L (ref 98–107)
CO2 SERPL-SCNC: 22 MMOL/L (ref 21–32)
CREAT SERPL-MCNC: 0.84 MG/DL (ref 0.5–1.05)
CRP SERPL-MCNC: 2.05 MG/DL
EGFRCR SERPLBLD CKD-EPI 2021: 68 ML/MIN/1.73M*2
EOSINOPHIL # BLD AUTO: 0.05 X10*3/UL (ref 0–0.4)
EOSINOPHIL NFR BLD AUTO: 0.4 %
ERYTHROCYTE [DISTWIDTH] IN BLOOD BY AUTOMATED COUNT: 15.7 % (ref 11.5–14.5)
ERYTHROCYTE [SEDIMENTATION RATE] IN BLOOD BY WESTERGREN METHOD: 11 MM/H (ref 0–30)
FERRITIN SERPL-MCNC: 289 NG/ML (ref 8–150)
FLUAV RNA RESP QL NAA+PROBE: NOT DETECTED
FLUBV RNA RESP QL NAA+PROBE: NOT DETECTED
FOLATE SERPL-MCNC: 14.4 NG/ML
FOLATE SERPL-MCNC: 16.7 NG/ML
GLUCOSE BLD MANUAL STRIP-MCNC: 165 MG/DL (ref 74–99)
GLUCOSE SERPL-MCNC: 186 MG/DL (ref 74–99)
HCT VFR BLD AUTO: 25.8 % (ref 36–46)
HGB BLD-MCNC: 8.1 G/DL (ref 12–16)
HOLD SPECIMEN: NORMAL
HOLD SPECIMEN: NORMAL
IMM GRANULOCYTES # BLD AUTO: 0.24 X10*3/UL (ref 0–0.5)
IMM GRANULOCYTES NFR BLD AUTO: 1.8 % (ref 0–0.9)
INR PPP: 1 (ref 0.9–1.1)
IRON SATN MFR SERPL: 53 % (ref 25–45)
IRON SERPL-MCNC: 104 UG/DL (ref 35–150)
LACTATE SERPL-SCNC: 2 MMOL/L (ref 0.4–2)
LACTATE SERPL-SCNC: 3.1 MMOL/L (ref 0.4–2)
LYMPHOCYTES # BLD AUTO: 0.75 X10*3/UL (ref 0.8–3)
LYMPHOCYTES NFR BLD AUTO: 5.7 %
MAGNESIUM SERPL-MCNC: 2.01 MG/DL (ref 1.6–2.4)
MCH RBC QN AUTO: 32.1 PG (ref 26–34)
MCHC RBC AUTO-ENTMCNC: 31.4 G/DL (ref 32–36)
MCV RBC AUTO: 102 FL (ref 80–100)
MONOCYTES # BLD AUTO: 0.58 X10*3/UL (ref 0.05–0.8)
MONOCYTES NFR BLD AUTO: 4.4 %
NEUTROPHILS # BLD AUTO: 11.56 X10*3/UL (ref 1.6–5.5)
NEUTROPHILS NFR BLD AUTO: 87.5 %
NRBC BLD-RTO: 0 /100 WBCS (ref 0–0)
PHOSPHATE SERPL-MCNC: 3.3 MG/DL (ref 2.5–4.9)
PLATELET # BLD AUTO: 405 X10*3/UL (ref 150–450)
POTASSIUM SERPL-SCNC: 3.3 MMOL/L (ref 3.5–5.3)
PROT SERPL-MCNC: 4.7 G/DL (ref 6.4–8.2)
PROTHROMBIN TIME: 11.9 SECONDS (ref 9.8–12.8)
RBC # BLD AUTO: 2.52 X10*6/UL (ref 4–5.2)
RSV RNA RESP QL NAA+PROBE: NOT DETECTED
SARS-COV-2 RNA RESP QL NAA+PROBE: NOT DETECTED
SODIUM SERPL-SCNC: 132 MMOL/L (ref 136–145)
TIBC SERPL-MCNC: 196 UG/DL (ref 240–445)
TSH SERPL-ACNC: 3.19 MIU/L (ref 0.44–3.98)
UIBC SERPL-MCNC: 92 UG/DL (ref 110–370)
VIT B12 SERPL-MCNC: 393 PG/ML (ref 211–911)
WBC # BLD AUTO: 13.2 X10*3/UL (ref 4.4–11.3)

## 2024-09-12 PROCEDURE — 82746 ASSAY OF FOLIC ACID SERUM: CPT | Performed by: HOSPITALIST

## 2024-09-12 PROCEDURE — 82607 VITAMIN B-12: CPT | Performed by: HOSPITALIST

## 2024-09-12 PROCEDURE — 87077 CULTURE AEROBIC IDENTIFY: CPT | Mod: SAMLAB | Performed by: EMERGENCY MEDICINE

## 2024-09-12 PROCEDURE — 2550000001 HC RX 255 CONTRASTS: Performed by: EMERGENCY MEDICINE

## 2024-09-12 PROCEDURE — 94762 N-INVAS EAR/PLS OXIMTRY CONT: CPT

## 2024-09-12 PROCEDURE — 86140 C-REACTIVE PROTEIN: CPT | Performed by: HOSPITALIST

## 2024-09-12 PROCEDURE — 93005 ELECTROCARDIOGRAM TRACING: CPT

## 2024-09-12 PROCEDURE — 85610 PROTHROMBIN TIME: CPT | Performed by: EMERGENCY MEDICINE

## 2024-09-12 PROCEDURE — 2500000004 HC RX 250 GENERAL PHARMACY W/ HCPCS (ALT 636 FOR OP/ED): Performed by: HOSPITALIST

## 2024-09-12 PROCEDURE — 87637 SARSCOV2&INF A&B&RSV AMP PRB: CPT | Performed by: EMERGENCY MEDICINE

## 2024-09-12 PROCEDURE — 84484 ASSAY OF TROPONIN QUANT: CPT | Performed by: EMERGENCY MEDICINE

## 2024-09-12 PROCEDURE — 84443 ASSAY THYROID STIM HORMONE: CPT | Performed by: HOSPITALIST

## 2024-09-12 PROCEDURE — 85652 RBC SED RATE AUTOMATED: CPT | Performed by: HOSPITALIST

## 2024-09-12 PROCEDURE — 99222 1ST HOSP IP/OBS MODERATE 55: CPT | Performed by: STUDENT IN AN ORGANIZED HEALTH CARE EDUCATION/TRAINING PROGRAM

## 2024-09-12 PROCEDURE — 76705 ECHO EXAM OF ABDOMEN: CPT | Mod: FOREIGN READ | Performed by: RADIOLOGY

## 2024-09-12 PROCEDURE — 85730 THROMBOPLASTIN TIME PARTIAL: CPT | Performed by: EMERGENCY MEDICINE

## 2024-09-12 PROCEDURE — 2500000004 HC RX 250 GENERAL PHARMACY W/ HCPCS (ALT 636 FOR OP/ED): Performed by: EMERGENCY MEDICINE

## 2024-09-12 PROCEDURE — 9420000001 HC RT PATIENT EDUCATION 5 MIN

## 2024-09-12 PROCEDURE — 87493 C DIFF AMPLIFIED PROBE: CPT | Performed by: EMERGENCY MEDICINE

## 2024-09-12 PROCEDURE — 82947 ASSAY GLUCOSE BLOOD QUANT: CPT

## 2024-09-12 PROCEDURE — 83735 ASSAY OF MAGNESIUM: CPT | Performed by: EMERGENCY MEDICINE

## 2024-09-12 PROCEDURE — 76705 ECHO EXAM OF ABDOMEN: CPT

## 2024-09-12 PROCEDURE — 80053 COMPREHEN METABOLIC PANEL: CPT | Performed by: EMERGENCY MEDICINE

## 2024-09-12 PROCEDURE — 70450 CT HEAD/BRAIN W/O DYE: CPT

## 2024-09-12 PROCEDURE — 83605 ASSAY OF LACTIC ACID: CPT | Performed by: EMERGENCY MEDICINE

## 2024-09-12 PROCEDURE — 87040 BLOOD CULTURE FOR BACTERIA: CPT | Mod: SAMLAB | Performed by: EMERGENCY MEDICINE

## 2024-09-12 PROCEDURE — 94761 N-INVAS EAR/PLS OXIMETRY MLT: CPT

## 2024-09-12 PROCEDURE — 36415 COLL VENOUS BLD VENIPUNCTURE: CPT | Performed by: EMERGENCY MEDICINE

## 2024-09-12 PROCEDURE — 74177 CT ABD & PELVIS W/CONTRAST: CPT

## 2024-09-12 PROCEDURE — 96375 TX/PRO/DX INJ NEW DRUG ADDON: CPT

## 2024-09-12 PROCEDURE — 2500000001 HC RX 250 WO HCPCS SELF ADMINISTERED DRUGS (ALT 637 FOR MEDICARE OP): Performed by: HOSPITALIST

## 2024-09-12 PROCEDURE — 1200000002 HC GENERAL ROOM WITH TELEMETRY DAILY

## 2024-09-12 PROCEDURE — 99285 EMERGENCY DEPT VISIT HI MDM: CPT

## 2024-09-12 PROCEDURE — 2500000005 HC RX 250 GENERAL PHARMACY W/O HCPCS: Performed by: EMERGENCY MEDICINE

## 2024-09-12 PROCEDURE — 99223 1ST HOSP IP/OBS HIGH 75: CPT | Performed by: HOSPITALIST

## 2024-09-12 PROCEDURE — 70450 CT HEAD/BRAIN W/O DYE: CPT | Performed by: RADIOLOGY

## 2024-09-12 PROCEDURE — 82728 ASSAY OF FERRITIN: CPT | Performed by: HOSPITALIST

## 2024-09-12 PROCEDURE — 85025 COMPLETE CBC W/AUTO DIFF WBC: CPT | Performed by: EMERGENCY MEDICINE

## 2024-09-12 PROCEDURE — 83540 ASSAY OF IRON: CPT | Performed by: HOSPITALIST

## 2024-09-12 PROCEDURE — 96366 THER/PROPH/DIAG IV INF ADDON: CPT

## 2024-09-12 PROCEDURE — 84100 ASSAY OF PHOSPHORUS: CPT | Performed by: HOSPITALIST

## 2024-09-12 PROCEDURE — 96365 THER/PROPH/DIAG IV INF INIT: CPT | Mod: 59

## 2024-09-12 PROCEDURE — 94664 DEMO&/EVAL PT USE INHALER: CPT

## 2024-09-12 RX ORDER — FERROUS SULFATE 325(65) MG
65 TABLET ORAL DAILY
Status: DISCONTINUED | OUTPATIENT
Start: 2024-09-13 | End: 2024-09-18 | Stop reason: HOSPADM

## 2024-09-12 RX ORDER — ONDANSETRON HYDROCHLORIDE 2 MG/ML
4 INJECTION, SOLUTION INTRAVENOUS ONCE
Status: COMPLETED | OUTPATIENT
Start: 2024-09-12 | End: 2024-09-12

## 2024-09-12 RX ORDER — MEMANTINE HYDROCHLORIDE 10 MG/1
10 TABLET ORAL 2 TIMES DAILY
Status: DISCONTINUED | OUTPATIENT
Start: 2024-09-12 | End: 2024-09-18 | Stop reason: HOSPADM

## 2024-09-12 RX ORDER — ASPIRIN 325 MG
325 TABLET ORAL 2 TIMES DAILY
Status: DISCONTINUED | OUTPATIENT
Start: 2024-09-12 | End: 2024-09-13

## 2024-09-12 RX ORDER — SODIUM CHLORIDE 9 MG/ML
100 INJECTION, SOLUTION INTRAVENOUS CONTINUOUS
Status: DISCONTINUED | OUTPATIENT
Start: 2024-09-12 | End: 2024-09-16

## 2024-09-12 RX ORDER — ASCORBIC ACID 500 MG
500 TABLET ORAL DAILY
Status: DISCONTINUED | OUTPATIENT
Start: 2024-09-13 | End: 2024-09-18 | Stop reason: HOSPADM

## 2024-09-12 RX ORDER — MICONAZOLE NITRATE 2 G/100G
1 POWDER TOPICAL AS NEEDED
Status: DISCONTINUED | OUTPATIENT
Start: 2024-09-12 | End: 2024-09-12

## 2024-09-12 RX ORDER — ACETAMINOPHEN 325 MG/1
650 TABLET ORAL EVERY 6 HOURS PRN
COMMUNITY

## 2024-09-12 RX ORDER — POTASSIUM CHLORIDE 14.9 MG/ML
20 INJECTION INTRAVENOUS ONCE
Status: COMPLETED | OUTPATIENT
Start: 2024-09-12 | End: 2024-09-12

## 2024-09-12 RX ORDER — KETOCONAZOLE 20 MG/G
CREAM TOPICAL DAILY PRN
Status: DISCONTINUED | OUTPATIENT
Start: 2024-09-12 | End: 2024-09-18 | Stop reason: HOSPADM

## 2024-09-12 SDOH — ECONOMIC STABILITY: TRANSPORTATION INSECURITY
IN THE PAST 12 MONTHS, HAS THE LACK OF TRANSPORTATION KEPT YOU FROM MEDICAL APPOINTMENTS OR FROM GETTING MEDICATIONS?: PATIENT UNABLE TO ANSWER

## 2024-09-12 SDOH — SOCIAL STABILITY: SOCIAL INSECURITY: HAVE YOU HAD ANY THOUGHTS OF HARMING ANYONE ELSE?: UNABLE TO ASSESS

## 2024-09-12 SDOH — ECONOMIC STABILITY: TRANSPORTATION INSECURITY
IN THE PAST 12 MONTHS, HAS LACK OF TRANSPORTATION KEPT YOU FROM MEETINGS, WORK, OR FROM GETTING THINGS NEEDED FOR DAILY LIVING?: PATIENT UNABLE TO ANSWER

## 2024-09-12 SDOH — ECONOMIC STABILITY: INCOME INSECURITY
IN THE LAST 12 MONTHS, WAS THERE A TIME WHEN YOU WERE NOT ABLE TO PAY THE MORTGAGE OR RENT ON TIME?: PATIENT UNABLE TO ANSWER

## 2024-09-12 SDOH — SOCIAL STABILITY: SOCIAL INSECURITY: DO YOU FEEL UNSAFE GOING BACK TO THE PLACE WHERE YOU ARE LIVING?: UNABLE TO ASSESS

## 2024-09-12 SDOH — SOCIAL STABILITY: SOCIAL INSECURITY: DO YOU FEEL ANYONE HAS EXPLOITED OR TAKEN ADVANTAGE OF YOU FINANCIALLY OR OF YOUR PERSONAL PROPERTY?: UNABLE TO ASSESS

## 2024-09-12 SDOH — ECONOMIC STABILITY: INCOME INSECURITY
HOW HARD IS IT FOR YOU TO PAY FOR THE VERY BASICS LIKE FOOD, HOUSING, MEDICAL CARE, AND HEATING?: PATIENT UNABLE TO ANSWER

## 2024-09-12 SDOH — SOCIAL STABILITY: SOCIAL INSECURITY: ARE THERE ANY APPARENT SIGNS OF INJURIES/BEHAVIORS THAT COULD BE RELATED TO ABUSE/NEGLECT?: UNABLE TO ASSESS

## 2024-09-12 SDOH — SOCIAL STABILITY: SOCIAL INSECURITY: HAVE YOU HAD THOUGHTS OF HARMING ANYONE ELSE?: NO

## 2024-09-12 SDOH — SOCIAL STABILITY: SOCIAL INSECURITY: ARE YOU OR HAVE YOU BEEN THREATENED OR ABUSED PHYSICALLY, EMOTIONALLY, OR SEXUALLY BY ANYONE?: UNABLE TO ASSESS

## 2024-09-12 SDOH — SOCIAL STABILITY: SOCIAL INSECURITY: ABUSE: ADULT

## 2024-09-12 SDOH — SOCIAL STABILITY: SOCIAL INSECURITY: DOES ANYONE TRY TO KEEP YOU FROM HAVING/CONTACTING OTHER FRIENDS OR DOING THINGS OUTSIDE YOUR HOME?: UNABLE TO ASSESS

## 2024-09-12 SDOH — SOCIAL STABILITY: SOCIAL INSECURITY: HAS ANYONE EVER THREATENED TO HURT YOUR FAMILY OR YOUR PETS?: UNABLE TO ASSESS

## 2024-09-12 SDOH — ECONOMIC STABILITY: HOUSING INSECURITY: IN THE PAST 12 MONTHS, HOW MANY TIMES HAVE YOU MOVED WHERE YOU WERE LIVING?: 1

## 2024-09-12 SDOH — SOCIAL STABILITY: SOCIAL INSECURITY: WERE YOU ABLE TO COMPLETE ALL THE BEHAVIORAL HEALTH SCREENINGS?: NO

## 2024-09-12 SDOH — ECONOMIC STABILITY: HOUSING INSECURITY: AT ANY TIME IN THE PAST 12 MONTHS, WERE YOU HOMELESS OR LIVING IN A SHELTER (INCLUDING NOW)?: PATIENT UNABLE TO ANSWER

## 2024-09-12 ASSESSMENT — PAIN SCALES - PAIN ASSESSMENT IN ADVANCED DEMENTIA (PAINAD)
BREATHING: NORMAL
FACIALEXPRESSION: SMILING OR INEXPRESSIVE
BODYLANGUAGE: RELAXED
TOTALSCORE: 0
CONSOLABILITY: NO NEED TO CONSOLE

## 2024-09-12 ASSESSMENT — COGNITIVE AND FUNCTIONAL STATUS - GENERAL
DRESSING REGULAR LOWER BODY CLOTHING: TOTAL
STANDING UP FROM CHAIR USING ARMS: TOTAL
PERSONAL GROOMING: TOTAL
TOILETING: TOTAL
MOVING FROM LYING ON BACK TO SITTING ON SIDE OF FLAT BED WITH BEDRAILS: A LOT
WALKING IN HOSPITAL ROOM: TOTAL
MOBILITY SCORE: 8
DAILY ACTIVITIY SCORE: 7
MOVING TO AND FROM BED TO CHAIR: TOTAL
HELP NEEDED FOR BATHING: TOTAL
DRESSING REGULAR UPPER BODY CLOTHING: TOTAL
CLIMB 3 TO 5 STEPS WITH RAILING: TOTAL
TURNING FROM BACK TO SIDE WHILE IN FLAT BAD: A LOT
EATING MEALS: A LOT

## 2024-09-12 ASSESSMENT — VISUAL ACUITY: OU: 1

## 2024-09-12 ASSESSMENT — LIFESTYLE VARIABLES
HOW MANY STANDARD DRINKS CONTAINING ALCOHOL DO YOU HAVE ON A TYPICAL DAY: PATIENT DOES NOT DRINK
AUDIT-C TOTAL SCORE: 0
AUDIT-C TOTAL SCORE: 0
HOW OFTEN DO YOU HAVE A DRINK CONTAINING ALCOHOL: NEVER
SKIP TO QUESTIONS 9-10: 1
HOW OFTEN DO YOU HAVE 6 OR MORE DRINKS ON ONE OCCASION: NEVER

## 2024-09-12 ASSESSMENT — PAIN SCALES - GENERAL: PAINLEVEL_OUTOF10: 0 - NO PAIN

## 2024-09-12 ASSESSMENT — PATIENT HEALTH QUESTIONNAIRE - PHQ9
SUM OF ALL RESPONSES TO PHQ9 QUESTIONS 1 & 2: 0
1. LITTLE INTEREST OR PLEASURE IN DOING THINGS: NOT AT ALL
2. FEELING DOWN, DEPRESSED OR HOPELESS: NOT AT ALL

## 2024-09-12 ASSESSMENT — PAIN - FUNCTIONAL ASSESSMENT: PAIN_FUNCTIONAL_ASSESSMENT: PAINAD (PAIN ASSESSMENT IN ADVANCED DEMENTIA SCALE)

## 2024-09-12 NOTE — ED PROVIDER NOTES
Unresponsiveness.  This 83-year-old white female presented to the ED with complaint of being unresponsive with low blood pressure.  The patient's last well-known was at 8:30 AM.  Patient was treated with push dose epinephrine by EMS and patient had mild improvement of her blood pressure and the patient was able to communicate with us on arrival to the ED she was complaining of abdominal pain symptoms.  Patient had a code stroke that was noted at 9:27 AM when the patient arrived to the trauma bay.  After initial assessment of the patient she was taken mainly to the CT scan for potential stroke workup she was ordered a CT scan of the brain in addition to his CT scan imaging of the abdomen pelvis.  Patient returned to the room at 9:49 AM and she currently has no complaints.      History provided by:  Patient and EMS personnel   used: No         Physical Exam  Vitals and nursing note reviewed.   Constitutional:       General: She is awake.      Appearance: Normal appearance. She is normal weight.   HENT:      Head: Normocephalic and atraumatic.      Jaw: There is normal jaw occlusion.      Right Ear: External ear normal. Decreased hearing noted.      Left Ear: External ear normal. Decreased hearing noted.      Nose: Nose normal. No congestion or rhinorrhea.      Mouth/Throat:      Mouth: Mucous membranes are moist. Mucous membranes are pale.      Pharynx: Oropharynx is clear. Uvula midline. No oropharyngeal exudate or posterior oropharyngeal erythema.   Eyes:      General: Lids are normal. Vision grossly intact.         Right eye: No discharge.         Left eye: No discharge.      Extraocular Movements: Extraocular movements intact.      Conjunctiva/sclera: Conjunctivae normal.      Pupils: Pupils are equal, round, and reactive to light.   Cardiovascular:      Rate and Rhythm: Normal rate and regular rhythm.      Pulses: Normal pulses.      Heart sounds: Normal heart sounds. No murmur heard.     No  friction rub. No gallop.   Pulmonary:      Effort: Pulmonary effort is normal. No respiratory distress.      Breath sounds: Normal breath sounds. No stridor. No wheezing, rhonchi or rales.   Chest:      Chest wall: No tenderness.   Abdominal:      General: Abdomen is flat. Bowel sounds are decreased. There is no distension.      Palpations: Abdomen is soft. There is no mass.      Tenderness: There is generalized abdominal tenderness. There is no guarding or rebound.      Hernia: No hernia is present.      Comments: Patient has diffuse tenderness of the abdomen.   Musculoskeletal:         General: No swelling, tenderness, deformity or signs of injury. Normal range of motion.      Cervical back: Full passive range of motion without pain, normal range of motion and neck supple.      Right lower le+ Pitting Edema present.      Left lower le+ Pitting Edema present.   Skin:     General: Skin is cool and dry.      Capillary Refill: Capillary refill takes less than 2 seconds.      Coloration: Skin is pale. Skin is not jaundiced.      Findings: No bruising, erythema, lesion or rash.   Neurological:      General: No focal deficit present.      Mental Status: She is alert and oriented to person, place, and time.      GCS: GCS eye subscore is 4. GCS verbal subscore is 5. GCS motor subscore is 6.      Cranial Nerves: Cranial nerves 2-12 are intact. No cranial nerve deficit.      Sensory: Sensation is intact. No sensory deficit.      Motor: Motor function is intact. No weakness.      Coordination: Coordination normal.      Deep Tendon Reflexes: Reflexes normal.   Psychiatric:         Mood and Affect: Mood normal.         Behavior: Behavior normal. Behavior is cooperative.         Thought Content: Thought content normal.         Judgment: Judgment normal.          Labs Reviewed   BLOOD CULTURE - Abnormal       Result Value    Blood Culture        Value: Identification and susceptibility testing to follow    Gram Stain Gram  negative bacilli (*)    OCCULT BLOOD X1, STOOL - Abnormal    Occult Blood, Stool X1 Positive (*)    CBC WITH AUTO DIFFERENTIAL - Abnormal    WBC 13.2 (*)     nRBC 0.0      RBC 2.52 (*)     Hemoglobin 8.1 (*)     Hematocrit 25.8 (*)      (*)     MCH 32.1      MCHC 31.4 (*)     RDW 15.7 (*)     Platelets 405      Neutrophils % 87.5      Immature Granulocytes %, Automated 1.8 (*)     Lymphocytes % 5.7      Monocytes % 4.4      Eosinophils % 0.4      Basophils % 0.2      Neutrophils Absolute 11.56 (*)     Immature Granulocytes Absolute, Automated 0.24      Lymphocytes Absolute 0.75 (*)     Monocytes Absolute 0.58      Eosinophils Absolute 0.05      Basophils Absolute 0.03     COMPREHENSIVE METABOLIC PANEL - Abnormal    Glucose 186 (*)     Sodium 132 (*)     Potassium 3.3 (*)     Chloride 103      Bicarbonate 22      Anion Gap 10      Urea Nitrogen 20      Creatinine 0.84      eGFR 68      Calcium 8.2 (*)     Albumin 2.7 (*)     Alkaline Phosphatase 186 (*)     Total Protein 4.7 (*)     AST 19      Bilirubin, Total 1.4 (*)     ALT 7     TROPONIN I, HIGH SENSITIVITY - Abnormal    Troponin I, High Sensitivity 14 (*)     Narrative:     Less than 99th percentile of normal range cutoff-  Female and children under 18 years old <14 ng/L; Male <21 ng/L: Negative  Repeat testing should be performed if clinically indicated.     Female and children under 18 years old 14-50 ng/L; Male 21-50 ng/L:  Consistent with possible cardiac damage and possible increased clinical   risk. Serial measurements may help to assess extent of myocardial damage.     >50 ng/L: Consistent with cardiac damage, increased clinical risk and  myocardial infarction. Serial measurements may help assess extent of   myocardial damage.      NOTE: Children less than 1 year old may have higher baseline troponin   levels and results should be interpreted in conjunction with the overall   clinical context.     NOTE: Troponin I testing is performed using a  different   testing methodology at [facility] than at other   [facility]. Direct result comparisons should only   be made within the same method.   APTT - Abnormal    aPTT 23 (*)     Narrative:     The APTT is no longer used for monitoring Unfractionated Heparin Therapy. For monitoring Heparin Therapy, use the Heparin Assay.   TROPONIN I, HIGH SENSITIVITY - Abnormal    Troponin I, High Sensitivity 17 (*)     Narrative:     Less than 99th percentile of normal range cutoff-  Female and children under 18 years old <14 ng/L; Male <21 ng/L: Negative  Repeat testing should be performed if clinically indicated.     Female and children under 18 years old 14-50 ng/L; Male 21-50 ng/L:  Consistent with possible cardiac damage and possible increased clinical   risk. Serial measurements may help to assess extent of myocardial damage.     >50 ng/L: Consistent with cardiac damage, increased clinical risk and  myocardial infarction. Serial measurements may help assess extent of   myocardial damage.      NOTE: Children less than 1 year old may have higher baseline troponin   levels and results should be interpreted in conjunction with the overall   clinical context.     NOTE: Troponin I testing is performed using a different   testing methodology at [facility] than at other   [facility]. Direct result comparisons should only   be made within the same method.   LACTATE - Abnormal    Lactate 3.1 (*)     Narrative:     Venipuncture immediately after or during the administration of Metamizole may lead to falsely low results. Testing should be performed immediately  prior to Metamizole dosing.   URINALYSIS WITH REFLEX CULTURE AND MICROSCOPIC - Abnormal    Color, Urine Yellow      Appearance, Urine Turbid (*)     Specific Gravity, Urine 1.048 (*)     pH, Urine 6.5      Protein, Urine 70 (1+) (*)     Glucose, Urine Normal      Blood, Urine NEGATIVE      Ketones, Urine 10 (1+) (*)     Bilirubin, Urine  NEGATIVE      Urobilinogen, Urine 4 (2+) (*)     Nitrite, Urine NEGATIVE      Leukocyte Esterase, Urine 250 Ivette/µL (*)    C-REACTIVE PROTEIN - Abnormal    C-Reactive Protein 2.05 (*)    IRON AND TIBC - Abnormal    Iron 104      UIBC 92 (*)     TIBC 196 (*)     % Saturation 53 (*)    FERRITIN - Abnormal    Ferritin 289 (*)    MICROSCOPIC ONLY, URINE - Abnormal    WBC, Urine 21-50 (*)     RBC, Urine 11-20 (*)     Squamous Epithelial Cells, Urine 10-25 (FEW)      Bacteria, Urine 1+ (*)     Budding Yeast, Urine PRESENT (*)     Mixed Cell Casts, Urine 2+ (*)    CBC - Abnormal    WBC 13.3 (*)     nRBC 0.0      RBC 2.40 (*)     Hemoglobin 7.8 (*)     Hematocrit 24.5 (*)      (*)     MCH 32.5      MCHC 31.8 (*)     RDW 16.0 (*)     Platelets 451 (*)    BASIC METABOLIC PANEL - Abnormal    Glucose 86      Sodium 136      Potassium 3.0 (*)     Chloride 110 (*)     Bicarbonate 21      Anion Gap 8 (*)     Urea Nitrogen 12      Creatinine 0.38 (*)     eGFR >90      Calcium 8.0 (*)    CBC - Abnormal    WBC 10.1      nRBC 0.0      RBC 2.45 (*)     Hemoglobin 7.9 (*)     Hematocrit 26.0 (*)      (*)     MCH 32.2      MCHC 30.4 (*)     RDW 15.5 (*)     Platelets 393     BASIC METABOLIC PANEL - Abnormal    Glucose 93      Sodium 134 (*)     Potassium 2.8 (*)     Chloride 106      Bicarbonate 20 (*)     Anion Gap 11      Urea Nitrogen 4 (*)     Creatinine 0.39 (*)     eGFR >90      Calcium 7.9 (*)    POCT GLUCOSE - Abnormal    POCT Glucose 165 (*)    C. DIFFICILE, PCR - Normal    C. difficile, PCR Not Detected      Narrative:     This test is an FDA-cleared real-time PCR assay for detection of toxigenic C. difficile DNA from unprocessed liquid or unformed stool specimens that have not undergone nucleic acid extraction in symptomatic patients with potential C. difficile infection (CDI). A positive result may indicate colonization, and clinical assessment is required for the diagnosis of CDI. This test cannot be performed  on formed stools or used as a test of cure, and should not be performed more than once per 7 days.   BLOOD CULTURE - Normal    Blood Culture No growth at 2 days     URINE CULTURE - Normal    Urine Culture Culture in progress     BLOOD CULTURE - Normal    Blood Culture Loaded on Instrument - Culture in progress     PROTIME-INR - Normal    Protime 11.9      INR 1.0     MAGNESIUM - Normal    Magnesium 2.01     SARS-COV-2 PCR - Normal    Coronavirus 2019, PCR Not Detected      Narrative:     This assay has received FDA Emergency Use Authorization (EUA) and is only authorized for the duration of time that circumstances exist to justify the authorization of the emergency use of in vitro diagnostic tests for the detection of SARS-CoV-2 virus and/or diagnosis of COVID-19 infection under section 564(b)(1) of the Act, 21 U.S.C. 360bbb-3(b)(1). This assay is an in vitro diagnostic nucleic acid amplification test for the qualitative detection of SARS-CoV-2 from nasopharyngeal specimens and has been validated for use at [facility]. Negative results do not preclude COVID-19 infections and should not be used as the sole basis for diagnosis, treatment, or other management decisions.     INFLUENZA A AND B PCR - Normal    Flu A Result Not Detected      Flu B Result Not Detected      Narrative:     This assay is an in vitro diagnostic multiplex nucleic acid amplification test for the detection and discrimination of Influenza A & B from nasopharyngeal specimens, and has been validated for use at [facility]. Negative results do not preclude Influenza A/B infections, and should not be used as the sole basis for diagnosis, treatment, or other management decisions. If Influenza A/B and RSV PCR results are negative, testing for Parainfluenza virus, Adenovirus and Metapneumovirus is routinely performed for [facility] pediatric oncology and intensive care inpatients, and is available on other patients  by placing an add-on request.   RSV PCR - Normal    RSV PCR Not Detected      Narrative:     This assay is an FDA-cleared, in vitro diagnostic nucleic acid amplification test for the detection of RSV from nasopharyngeal specimens, and has been validated for use at [facility]. Negative results do not preclude RSV infections, and should not be used as the sole basis for diagnosis, treatment, or other management decisions. If Influenza A/B and RSV PCR results are negative, testing for Parainfluenza virus, Adenovirus and Metapneumovirus is routinely performed for pediatric oncology and intensive care inpatients at [facility], and is available on other patients by placing an add-on request.       LACTATE - Normal    Lactate 2.0      Narrative:     Venipuncture immediately after or during the administration of Metamizole may lead to falsely low results. Testing should be performed immediately  prior to Metamizole dosing.   PHOSPHORUS - Normal    Phosphorus 3.3     FOLATE - Normal    Folate, Serum 14.4      Narrative:     Low           <3.4  Borderline 3.4-5.0  Normal        >5.0    Patients receiving more than 5 mg/day of biotin may have interference in test results. A sample should be taken no sooner than eight hours after previous dose. Contact the testing laboratory for additional information.    SEDIMENTATION RATE, AUTOMATED - Normal    Sedimentation Rate 11     TSH - Normal    Thyroid Stimulating Hormone 3.19      Narrative:     TSH testing is performed using different testing methodology at [facility] than at other [facility]. Direct result comparisons should only be made within the same method.     VITAMIN B12 - Normal    Vitamin B12 393     FOLATE - Normal    Folate, Serum 16.7      Narrative:     Low           <3.4  Borderline 3.4-5.0  Normal        >5.0    Patients receiving more than 5 mg/day of biotin may have interference in test results. A sample should be taken no sooner  than eight hours after previous dose. Contact the testing laboratory for additional information.    STOOL PATHOGEN PANEL, PCR   URINALYSIS WITH REFLEX CULTURE AND MICROSCOPIC    Narrative:     The following orders were created for panel order Urinalysis with Reflex Culture and Microscopic.  Procedure                               Abnormality         Status                     ---------                               -----------         ------                     Urinalysis with Reflex C...[[id number]]  Abnormal            Final result               Extra Urine Gray Tube[[id number]]                                                         Please view results for these tests on the individual orders.   EXTRA URINE GRAY TUBE   POCT GLUCOSE METER        Vascular US carotid artery duplex bilateral   Final Result      Lower extremity venous duplex bilateral   Final Result      Transthoracic Echo (TTE) Complete   Final Result      US gallbladder   Final Result   Nonspecific heterogeneous/coarse hepatic parenchyma which may be seen   in the setting of fatty infiltration or other underlying   hepatocellular process. Correlation is recommended.   Gallbladder polyp measuring 0.3 x 0.2 cm.  The biliary tree is not   significantly dilated on this examination. However, if biliary enzymes   are abnormal, MRCP correlation is advised.   Right renal pelvis is dilated which is likely secondary to a normal   variant extrarenal pelvis. Recent abdominal CT did not show any   hydronephrosis.   Signed by Kavon Cuevas MD      CT abdomen pelvis w IV contrast   Final Result   Addendum (preliminary) 1 of 1   This finding was discussed with and acknowledged by GIO Rosario on 9/12/2024 at 10:22 AM   Signed by Jens Espana MD      Final   1. There is a large mass in the region of the right hip. This extends   through the subcutaneous fat and appears to invade the musculature   around the right hip. This is only partially visualized,  but measures   at least 5.4 x 2.7 x 8.0 cm.   2. The gallbladder is not clearly identified in the gallbladder fossa.   However, there is a thick-walled, somewhat tubular structure in the   right upper abdomen which may represent the gallbladder. The common   bile duct is slightly dilated which may be due to biliary obstruction.   Signed by Jens Espana MD      CT brain attack head wo IV contrast   Final Result   1. No acute intracranial pathology.   2. Cerebral atrophy and marked chronic small vessel disease.        MACRO:   Asha Roach discussed the significance and urgency of this   critical finding by secure chat with  REED HOPPER on 9/12/2024   at 9:46 am.  (**-RCF-**) Findings:  See findings.        Signed by: Asha Roach 9/12/2024 9:46 AM   Dictation workstation:   [id number]           Procedures     Medical Decision Making  Patient was seen and evaluated due to unresponsiveness.  Patient was noted initially to be hypotensive.  Patient was treated per EMS with push dose epi with improvement of blood pressure.  Patient with recent history of 2 falls with head injury.  Concern for possible intracranial hemorrhage with the top of the list for potential change in mental status and she was taken directly to CT scanner for evaluation in the meantime she was treated with IV fluids and I was later found out that the patient has been having diarrhea.  Patient was resuscitated with IV fluids with improvement of her blood pressure and her mental status.  Patient had lab work obtained which revealed hypokalemia and evidence of anemia and evidence of dehydration.  The patient also has some complaints of diffuse abdominal pain symptoms.  Patient was ordered a troponin that was negative.  CMP revealed a glucose of 186.  Sodium was 132 and potassium was 3.3.  Patient's calcium was low at 8.2.  Albumin was low at 2.7.  Alkaline phosphatase was elevated at 186.  Total protein was low at 4.7.  CBC revealed a  white cell count of 13.2.  Hemoglobin is low 8.4 hematocrit was low at 25.8 and platelets were normal at 405.  Testing for COVID, influenza and RSV were negative.  Lactic acid was normal at 2.0.  Phosphorus 3.3.  Urinalysis was not obtained during the course of the ED visit.  CT scan of the brain revealed no acute intracranial pathology with cerebral atrophy and marked chronic small vessel disease.  CT scan imaging of the abdomen pelvis with IV contrast revealed a large mass in the region of the right hip extending to the subcutaneous fat appears to invade the musculature around the right hip.  This is only partially visualized but measures at least 5.4 x 2.7 x 8 cm.  The gallbladder is not clearly identified in the gallbladder fossa however there is a thick walled somewhat tubular structure in the right upper abdomen which could represent the gallbladder.  The common bile duct is slightly dilated which may be due to biliary obstruction.  I did have a discussion with the patient's  concerning whether or not the patient has had her gallbladder out he states he was not sure because when she had her hysterectomy is possible they removed her gallbladderl.  Subsequently a gallbladder ultrasound was ordered and this revealed nonspecific heterogeneous coarse hepatic parenchyma which may be seen in the setting of fatty infiltration and/or underlying hepatocellular process correlation is recommended.  Gallbladder polyp measuring 0.3 x 0.2 cm the bili 3 is not significantly dilated on this examination however if biliary enzymes are abnormal MRCP correlation is advised.  The right renal pelvis is dilated which is likely secondary to a normal variant extrarenal pelvis.  Recent abdominal CT did not show any hydronephrosis.  After getting the gallbladder results the patient was then admitted to the hospital I did have a detailed discussion with both the patient and the patient's  concerning lab and x-ray and CT scan  results.    Amount and/or Complexity of Data Reviewed  ECG/medicine tests: independent interpretation performed.     Details: EKG was interpreted by myself at 10:06 AM revealed sinus rhythm with PACs and left axis deviation and nonspecific ST-T wave changes.  The heart rate is 80 bpm.  The CT interval is 158 ms.  The QRS duration 76 ms.  The QTc is 474 ms Axis is -37 degrees.         Diagnoses as of 09/15/24 1533   Syncope, unspecified syncope type   Dehydration   Diarrhea, unspecified type   Generalized abdominal pain   Hypokalemia   Anemia, unspecified type                    Tenzin Bush,   09/15/24 1533

## 2024-09-12 NOTE — CONSULTS
Inpatient consult to Cardiology  Consult performed by: Ten Rios MD  Consult ordered by: Orlando Whitman MD  Reason for consult: syncope        History Of Present Illness:    Mrs. Dinah Parmar is a 83 y.o. female being consulted by the Cardiology team for syncope. Patient with prior medical history significant for dementia, osteoporosis, urge incontinence, recent R hip fracture (9/1/2024). Patient with multiple recent admissions due to multiple falls including R hip fracture. She was brought from [facility] to ED [facility] on 09/12/2024 after an unwitnessed fall and unresponsiveness. Patient is a poor historian and cannot give reliable information at this point. History has been obtained from previous records. Per chart review,  she was found to have a right intertrochanteric hip fracture for which she underwent surgical fixation on 9/2/2024. Patient was found to have a T9 compression fracture with no involvement of the posterior structures, no epidural hematoma and no severe spinal canal stenosis. She was asymptomatic from that regard. The fracture was biomechanically stable. No neuro- surgical intervention is indicated. She was brought by squad with unresponsive with low blood pressure. The patient's last well-known was at 8:30 AM on 09/12/2024. Patient was treated with push dose epinephrine by EMS and patient had mild improvement of her blood pressure and the patient was able to communicate at the arrival to the ED she was complaining of abdominal pain symptoms. Patient had a code stroke that was noted at 9:27 AM when the patient arrived to the trauma bay. After initial assessment of the patient she was taken mainly to the CT scan for potential stroke workup she was ordered a CT scan of the brain in addition to his CT scan imaging of the abdomen pelvis. Patient returned to the room 1 hour later with no complains. EKG shows normal sinus rhythm with PACs and no signs of acute ischemic changes. Trop 14-17.  "Patient has been admitted for clinical compensation.     Last Recorded Vitals:  Vitals:    09/12/24 1401 09/12/24 1500 09/12/24 1515 09/12/24 1558   BP: 147/60  147/71    BP Location:   Left arm    Patient Position:   Lying    Pulse: 78  86    Resp: 18  18    Temp:   36.5 °C (97.7 °F)    TempSrc:   Temporal    SpO2: 100%  95% 96%   Weight:  47.7 kg (105 lb 3.2 oz)     Height:  1.6 m (5' 3\")         Last Labs:  CBC - 9/12/2024:  9:48 AM  13.2 8.1 405    25.8      CMP - 9/12/2024:  9:48 AM  8.2 4.7 19 --- 1.4   3.3 2.7 7 186      PTT - 9/12/2024:  9:48 AM  1.0   11.9 23     Troponin I, High Sensitivity   Date/Time Value Ref Range Status   09/12/2024 11:00 AM 17 (H) 0 - 13 ng/L Final   09/12/2024 09:48 AM 14 (H) 0 - 13 ng/L Final   09/07/2024 05:35 PM 22 (H) 0 - 13 ng/L Final     BNP   Date/Time Value Ref Range Status   09/07/2024 04:05  (H) 0 - 99 pg/mL Final      Last I/O:  No intake/output data recorded.    Past Cardiology Tests (Last 3 Years):  EKG:  ECG 12 lead 09/12/2024 (Preliminary)      ECG 12 lead 09/07/2024 (Preliminary)    Echo:  No results found for this or any previous visit from the past 1095 days.    Ejection Fractions:  No results found for: \"EF\"  Cath:  No results found for this or any previous visit from the past 1095 days.    Stress Test:  No results found for this or any previous visit from the past 1095 days.    Cardiac Imaging:  No results found for this or any previous visit from the past 1095 days.      Past Medical History:  She has a past medical history of Body mass index (BMI) 23.0-23.9, adult (12/21/2020), Contusion of eyeball and orbital tissues, left eye, initial encounter (02/18/2021), Elevated blood-pressure reading, without diagnosis of hypertension (05/21/2020), Other insomnia not due to a substance or known physiological condition (05/22/2020), Personal history of other specified conditions (02/18/2021), and Personal history of urinary (tract) infections (05/21/2020).    Past " Surgical History:  She has a past surgical history that includes Other surgical history (05/21/2020) and Hip fracture surgery (Right, 09/02/2024).      Social History:  She reports that she has never smoked. She has never used smokeless tobacco. She reports that she does not drink alcohol and does not use drugs.    Family History:  Family History   Problem Relation Name Age of Onset    Cancer Mother          Allergies:  Donepezil    Inpatient Medications:  Scheduled medications   Medication Dose Route Frequency     PRN medications   Medication    oxygen     Continuous Medications   Medication Dose Last Rate    sodium chloride 0.9%  100 mL/hr 100 mL/hr (09/12/24 1534)     Outpatient Medications:  Current Outpatient Medications   Medication Instructions    acetaminophen (TYLENOL) 650 mg, oral, Every 6 hours PRN    alendronate (FOSAMAX) 70 mg, oral, Every 7 days, Take in the morning with a full glass of water, on an empty stomach, and do not take anything else by mouth or lie down for the next 30 min.    ascorbic acid (Vitamin C) 500 mg tablet 1 tablet, oral, Daily    aspirin 325 mg, oral, 2 times daily    cephalexin (KEFLEX) 500 mg, oral, 4 times daily    cranberry 500 mg, oral, Daily    diphenhydrAMINE-acetaminophen (Tylenol PM)  mg per tablet 2 tablets, oral, Nightly PRN, Do not exceed 3000 mg of acetaminophen in one day    ferrous sulfate (325 mg ferrous sulfate) 325 mg, oral, Daily    HYDROcodone-acetaminophen (Vicodin) 5-300 mg tablet 1 tablet, oral, Every 6 hours PRN    ibandronate (BONIVA) 150 mg, oral, Every 30 days    ibuprofen 400 mg, oral, Every 6 hours PRN    magnesium hydroxide (Milk of Magnesia) 400 mg/5 mL suspension 30 mL, oral, Daily PRN    memantine (NAMENDA) 28 mg, oral, Daily    memantine (NAMENDA) 10 mg, oral, 2 times daily    miconazole (AntifungaL, miconazole,) 2 % powder 1 Application, Topical, As needed    multivitamin tablet 1 tablet, oral, Daily    vitamin E 1,000 Units, oral, Daily     white petrolatum ([place] Moisture Barrier Cr) 61 % cream 1 Application, topical (top), As needed, Every shift and PRN       Physical Exam:  General: alert and in no acute distress  HEENT: NC/AT; EOMI; PERRLA, external ear is normal  Neck: supple; trachea midline; no masses; no JVD  Chest: clear breath sounds bilaterally; no wheezing  Cardio: regular rhythm, S1S2 normal, no murmurs  Abdomen: Soft, non-tender, non-distension, no organomegaly  Extremities: Edema 1+/3+ LE b/l     Assessment/Plan     Mrs. Dinah Parmar is a 83 y.o. female being consulted by the Cardiology team for syncope. Patient with prior medical history significant for dementia, osteoporosis, urge incontinence, recent R hip fracture (9/1/2024). Patient with multiple recent admissions due to multiple falls including R hip fracture. She was brought from [facility] to ED [facility] on 09/12/2024 after an unwitnessed fall and unresponsiveness. Patient is a poor historian and cannot give reliable information at this point. History has been obtained from previous records. Per chart review,  she was found to have a right intertrochanteric hip fracture for which she underwent surgical fixation on 9/2/2024. Patient was found to have a T9 compression fracture with no involvement of the posterior structures, no epidural hematoma and no severe spinal canal stenosis. She was asymptomatic from that regard. The fracture was biomechanically stable. No neuro- surgical intervention is indicated. She was brought by squad with unresponsive with low blood pressure. The patient's last well-known was at 8:30 AM on 09/12/2024. Patient was treated with push dose epinephrine by EMS and patient had mild improvement of her blood pressure and the patient was able to communicate at the arrival to the ED she was complaining of abdominal pain symptoms. Patient had a code stroke that was noted at 9:27 AM when the patient arrived to the trauma bay. After initial assessment of the patient  she was taken mainly to the CT scan for potential stroke workup she was ordered a CT scan of the brain in addition to his CT scan imaging of the abdomen pelvis. Patient returned to the room 1 hour later with no complains. EKG shows normal sinus rhythm with PACs and no signs of acute ischemic changes. Trop 14-17. Patient has been admitted for clinical compensation.    Assessment      # Syncope  - She was brought by squad with unresponsive with low blood pressure.  - Patient was treated with push dose epinephrine by EMS and patient had mild improvement of her blood pressure and the patient was able to communicate.   - Trop 14-17  - EKG shows normal sinus rhythm with PACs and no signs of acute ischemic changes.  - Would suggest to continue current medical management per primary team.    - Would suggest echocardiogram.  - Keep Metoprolol succinate 25mg daily.  - Would suggest 7-day holter monitor upon discharge and follow up with Cardiology team.     Thank you for allowing me to participate in the care of this patient. Please reach me out if you have any questions or if you need any clarifications regarding the patient's care.     Peripheral IV 09/12/24 20 G Left Antecubital (Active)   Site Assessment Clean;Dry;Intact 09/12/24 1500   Dressing Type Transparent 09/12/24 1500   Line Status Flushed;Saline locked 09/12/24 1500   Dressing Status Clean;Dry;Occlusive 09/12/24 1500   Number of days: 0       Peripheral IV 09/12/24 22 G Right Antecubital (Active)   Site Assessment Clean;Dry;Intact 09/12/24 1500   Dressing Type Transparent 09/12/24 1500   Line Status Infusing 09/12/24 1500   Dressing Status Clean;Dry;Occlusive 09/12/24 1500   Number of days: 0       Code Status:  Full Code    Ten Rios MD  Cardiology

## 2024-09-13 ENCOUNTER — APPOINTMENT (OUTPATIENT)
Dept: VASCULAR MEDICINE | Facility: HOSPITAL | Age: 84
DRG: 640 | End: 2024-09-13
Payer: MEDICARE

## 2024-09-13 ENCOUNTER — APPOINTMENT (OUTPATIENT)
Dept: CARDIOLOGY | Facility: HOSPITAL | Age: 84
DRG: 640 | End: 2024-09-13
Payer: MEDICARE

## 2024-09-13 LAB
AORTIC VALVE MEAN GRADIENT: 7 MMHG
AORTIC VALVE PEAK VELOCITY: 1.9 M/S
APPEARANCE UR: ABNORMAL
ATRIAL RATE: 95 BPM
AV PEAK GRADIENT: 14.4 MMHG
AVA (PEAK VEL): 2.31 CM2
AVA (VTI): 2.39 CM2
BACTERIA #/AREA URNS AUTO: ABNORMAL /HPF
BILIRUB UR STRIP.AUTO-MCNC: NEGATIVE MG/DL
COLOR UR: YELLOW
EJECTION FRACTION APICAL 4 CHAMBER: 52.9
EJECTION FRACTION: 68 %
GLUCOSE UR STRIP.AUTO-MCNC: NORMAL MG/DL
HEMOCCULT SP1 STL QL: POSITIVE
KETONES UR STRIP.AUTO-MCNC: ABNORMAL MG/DL
LEFT ATRIUM VOLUME AREA LENGTH INDEX BSA: 9.3 ML/M2
LEFT VENTRICLE INTERNAL DIMENSION DIASTOLE: 3.92 CM (ref 3.5–6)
LEFT VENTRICULAR OUTFLOW TRACT DIAMETER: 1.7 CM
LEUKOCYTE ESTERASE UR QL STRIP.AUTO: ABNORMAL
LV EJECTION FRACTION BIPLANE: 50 %
MIXED CELL CASTS #/AREA UR COMP ASSIST: ABNORMAL /LPF
NITRITE UR QL STRIP.AUTO: NEGATIVE
P AXIS: 54 DEGREES
P OFFSET: 184 MS
P ONSET: 141 MS
PH UR STRIP.AUTO: 6.5 [PH]
PR INTERVAL: 146 MS
PROT UR STRIP.AUTO-MCNC: ABNORMAL MG/DL
Q ONSET: 214 MS
QRS COUNT: 16 BEATS
QRS DURATION: 72 MS
QT INTERVAL: 350 MS
QTC CALCULATION(BAZETT): 439 MS
QTC FREDERICIA: 407 MS
R AXIS: -29 DEGREES
RBC # UR STRIP.AUTO: NEGATIVE /UL
RBC #/AREA URNS AUTO: ABNORMAL /HPF
RIGHT VENTRICLE FREE WALL PEAK S': 22.4 CM/S
SP GR UR STRIP.AUTO: 1.05
SQUAMOUS #/AREA URNS AUTO: ABNORMAL /HPF
T AXIS: 28 DEGREES
T OFFSET: 389 MS
UROBILINOGEN UR STRIP.AUTO-MCNC: ABNORMAL MG/DL
VENTRICULAR RATE: 95 BPM
WBC #/AREA URNS AUTO: ABNORMAL /HPF
YEAST BUDDING #/AREA UR COMP ASSIST: PRESENT /HPF

## 2024-09-13 PROCEDURE — 2500000004 HC RX 250 GENERAL PHARMACY W/ HCPCS (ALT 636 FOR OP/ED): Performed by: INTERNAL MEDICINE

## 2024-09-13 PROCEDURE — 99233 SBSQ HOSP IP/OBS HIGH 50: CPT | Performed by: HOSPITALIST

## 2024-09-13 PROCEDURE — 93970 EXTREMITY STUDY: CPT | Performed by: INTERNAL MEDICINE

## 2024-09-13 PROCEDURE — 99232 SBSQ HOSP IP/OBS MODERATE 35: CPT

## 2024-09-13 PROCEDURE — 87086 URINE CULTURE/COLONY COUNT: CPT | Mod: SAMLAB | Performed by: EMERGENCY MEDICINE

## 2024-09-13 PROCEDURE — 93970 EXTREMITY STUDY: CPT

## 2024-09-13 PROCEDURE — 1200000002 HC GENERAL ROOM WITH TELEMETRY DAILY

## 2024-09-13 PROCEDURE — 97161 PT EVAL LOW COMPLEX 20 MIN: CPT | Mod: GP | Performed by: PHYSICAL THERAPIST

## 2024-09-13 PROCEDURE — 94761 N-INVAS EAR/PLS OXIMETRY MLT: CPT

## 2024-09-13 PROCEDURE — 2500000004 HC RX 250 GENERAL PHARMACY W/ HCPCS (ALT 636 FOR OP/ED): Performed by: HOSPITALIST

## 2024-09-13 PROCEDURE — 2500000001 HC RX 250 WO HCPCS SELF ADMINISTERED DRUGS (ALT 637 FOR MEDICARE OP): Performed by: HOSPITALIST

## 2024-09-13 PROCEDURE — 93306 TTE W/DOPPLER COMPLETE: CPT | Performed by: STUDENT IN AN ORGANIZED HEALTH CARE EDUCATION/TRAINING PROGRAM

## 2024-09-13 PROCEDURE — 82270 OCCULT BLOOD FECES: CPT | Mod: SAMLAB | Performed by: HOSPITALIST

## 2024-09-13 PROCEDURE — 97165 OT EVAL LOW COMPLEX 30 MIN: CPT | Mod: GO

## 2024-09-13 PROCEDURE — 81003 URINALYSIS AUTO W/O SCOPE: CPT | Performed by: EMERGENCY MEDICINE

## 2024-09-13 PROCEDURE — 2500000005 HC RX 250 GENERAL PHARMACY W/O HCPCS: Performed by: HOSPITALIST

## 2024-09-13 PROCEDURE — 93880 EXTRACRANIAL BILAT STUDY: CPT | Performed by: INTERNAL MEDICINE

## 2024-09-13 PROCEDURE — 93880 EXTRACRANIAL BILAT STUDY: CPT

## 2024-09-13 PROCEDURE — 93306 TTE W/DOPPLER COMPLETE: CPT

## 2024-09-13 RX ORDER — CEFTRIAXONE 1 G/50ML
1 INJECTION, SOLUTION INTRAVENOUS EVERY 24 HOURS
Status: DISCONTINUED | OUTPATIENT
Start: 2024-09-13 | End: 2024-09-16

## 2024-09-13 ASSESSMENT — COGNITIVE AND FUNCTIONAL STATUS - GENERAL
TOILETING: TOTAL
MOVING TO AND FROM BED TO CHAIR: TOTAL
STANDING UP FROM CHAIR USING ARMS: TOTAL
HELP NEEDED FOR BATHING: A LOT
PERSONAL GROOMING: A LITTLE
DRESSING REGULAR UPPER BODY CLOTHING: TOTAL
MOVING TO AND FROM BED TO CHAIR: TOTAL
MOBILITY SCORE: 8
TURNING FROM BACK TO SIDE WHILE IN FLAT BAD: A LOT
HELP NEEDED FOR BATHING: TOTAL
DRESSING REGULAR LOWER BODY CLOTHING: TOTAL
CLIMB 3 TO 5 STEPS WITH RAILING: TOTAL
DAILY ACTIVITIY SCORE: 12
TOILETING: TOTAL
MOVING FROM LYING ON BACK TO SITTING ON SIDE OF FLAT BED WITH BEDRAILS: TOTAL
DRESSING REGULAR LOWER BODY CLOTHING: TOTAL
MOVING FROM LYING ON BACK TO SITTING ON SIDE OF FLAT BED WITH BEDRAILS: A LOT
STANDING UP FROM CHAIR USING ARMS: TOTAL
PERSONAL GROOMING: TOTAL
EATING MEALS: A LOT
DAILY ACTIVITIY SCORE: 7
TURNING FROM BACK TO SIDE WHILE IN FLAT BAD: TOTAL
WALKING IN HOSPITAL ROOM: TOTAL
WALKING IN HOSPITAL ROOM: TOTAL
CLIMB 3 TO 5 STEPS WITH RAILING: TOTAL
EATING MEALS: A LITTLE
MOBILITY SCORE: 6
DRESSING REGULAR UPPER BODY CLOTHING: A LOT

## 2024-09-13 ASSESSMENT — PAIN - FUNCTIONAL ASSESSMENT
PAIN_FUNCTIONAL_ASSESSMENT: 0-10
PAIN_FUNCTIONAL_ASSESSMENT: PAINAD (PAIN ASSESSMENT IN ADVANCED DEMENTIA SCALE)
PAIN_FUNCTIONAL_ASSESSMENT: UNABLE TO SELF-REPORT
PAIN_FUNCTIONAL_ASSESSMENT: 0-10

## 2024-09-13 ASSESSMENT — PAIN SCALES - PAIN ASSESSMENT IN ADVANCED DEMENTIA (PAINAD)
CONSOLABILITY: NO NEED TO CONSOLE
FACIALEXPRESSION: SMILING OR INEXPRESSIVE
BODYLANGUAGE: RELAXED
BREATHING: NORMAL
TOTALSCORE: 0

## 2024-09-13 ASSESSMENT — ACTIVITIES OF DAILY LIVING (ADL)
LACK_OF_TRANSPORTATION: PATIENT UNABLE TO ANSWER
ADL_ASSISTANCE: NEEDS ASSISTANCE
BATHING_ASSISTANCE: MAXIMAL

## 2024-09-13 ASSESSMENT — PAIN SCALES - GENERAL: PAINLEVEL_OUTOF10: 0 - NO PAIN

## 2024-09-13 NOTE — H&P
History Of Present Illness  Dinah Parmar is a 83 y.o. female presenting with dehydration, diarrhea and syncope with volume depletion along with hypokalemia, hyponatremia.  Failure to thrive and dementia.  Recurrent recent falls.  She had right hip fracture surgery from unwitnessed fall.  She is unable to give any history.  LFTs were fine except slight elevation of alk phosphatase.  CT abdomen pelvis showing right hip mass.  She has right lower extremity swelling and maintaining perfusion and pulses.  No pallor paresthesias or pain in legs.  She was hypotensive and given supportive care with increased lactate in the ER and improved afterwards.  Troponin leak from nonischemic myocardial injury likely.  Leukocytosis and urinalysis sent.  Unable to give any history and could not tell whether she has cough or urinary complaints or fever chills.  She is anemic with elevated macrocytosis but no signs of active bleeding.  Surgical site area looks fine with staples in place in right hip region.  She is not cooperative and does not follow commands and unable to give reliable meaningful history.  CT head negative for acute process.  Her flu, COVID-19 and RSV test negative.  C. difficile also negative.  Unable to obtain further history.  No jaundice.  No apparent anorexia or weight loss but declining health and reduced nutrition likely.  Developing possibly failure to thrive.  Has ambulatory dysfunction.  Generalized weakness.  Due to alk phosphatase elevation as well finding of bile duct dilatation/biliary obstruction, ultrasound gallbladder showing fatty infiltration.  Gallbladder polyp.  CT abdomen pelvis initially showed large mass in right hip region.  Her CT head negative for acute process due to fall and possible syncope as well change in mentation.  No fever chills nausea vomiting diarrhea at this time.  No bleeding at this time.  No joint pains or skin rash at present.  Unable to tell whether she has URI  Past Medical  History  Past Medical History:   Diagnosis Date    Body mass index (BMI) 23.0-23.9, adult 12/21/2020    BMI 23.0-23.9, adult    Contusion of eyeball and orbital tissues, left eye, initial encounter 02/18/2021    Ecchymosis of left eye, initial encounter    Elevated blood-pressure reading, without diagnosis of hypertension 05/21/2020    Elevated blood pressure reading    Other insomnia not due to a substance or known physiological condition 05/22/2020    Situational insomnia    Personal history of other specified conditions 02/18/2021    History of memory loss    Personal history of urinary (tract) infections 05/21/2020    History of UTI   Dementia  UTI  Osteoporosis      Surgical History  Past Surgical History:   Procedure Laterality Date    HIP FRACTURE SURGERY Right 09/02/2024    fixation of right hip fracture    OTHER SURGICAL HISTORY  05/21/2020    Hysterectomy        Social History  She reports that she has never smoked. She has never used smokeless tobacco. She reports that she does not drink alcohol and does not use drugs.    Family History  Family History   Problem Relation Name Age of Onset    Cancer Mother          Allergies  Donepezil    Review of Systems  All other 12 point review of systems negative except HPI  Physical Exam   General Appearance: AAO x 1  Skin: skin color pink, warm, and dry; no suspicious rashes or lesions  Eyes : PERRL, EOM's intact  ENT: mucous membranes pink and moist  Neck: normocephalic  Respiratory: lungs clear to auscultation anteriorly; no wheezing, rhonchi, or crackles.   Heart: regular rate and rhythm.   Abdomen: Nondistended, positive bowel sounds x4, soft,  nontender  Extremities: Slight right lower extremity edema, staples in right hip region intact  Peripheral pulses: normal x4 extremities  Neuro: Unable to assess, dementia does not follow commands properly  Last Recorded Vitals  Blood pressure 130/69, pulse 97, temperature 36.4 °C (97.5 °F), temperature source Temporal,  "resp. rate 18, height 1.6 m (5' 3\"), weight 47.7 kg (105 lb 3.2 oz), SpO2 95%.    Relevant Results  Scheduled medications  [START ON 9/13/2024] ascorbic acid, 500 mg, oral, Daily  aspirin, 325 mg, oral, BID  [START ON 9/13/2024] ferrous sulfate (325 mg ferrous sulfate), 65 mg of iron, oral, Daily  memantine, 10 mg, oral, BID      Continuous medications  sodium chloride 0.9%, 100 mL/hr, Last Rate: 100 mL/hr (09/12/24 1534)      PRN medications  PRN medications: ketoconazole, oxygen    Results for orders placed or performed during the hospital encounter of 09/12/24 (from the past 24 hour(s))   CBC and Auto Differential   Result Value Ref Range    WBC 13.2 (H) 4.4 - 11.3 x10*3/uL    nRBC 0.0 0.0 - 0.0 /100 WBCs    RBC 2.52 (L) 4.00 - 5.20 x10*6/uL    Hemoglobin 8.1 (L) 12.0 - 16.0 g/dL    Hematocrit 25.8 (L) 36.0 - 46.0 %     (H) 80 - 100 fL    MCH 32.1 26.0 - 34.0 pg    MCHC 31.4 (L) 32.0 - 36.0 g/dL    RDW 15.7 (H) 11.5 - 14.5 %    Platelets 405 150 - 450 x10*3/uL    Neutrophils % 87.5 40.0 - 80.0 %    Immature Granulocytes %, Automated 1.8 (H) 0.0 - 0.9 %    Lymphocytes % 5.7 13.0 - 44.0 %    Monocytes % 4.4 2.0 - 10.0 %    Eosinophils % 0.4 0.0 - 6.0 %    Basophils % 0.2 0.0 - 2.0 %    Neutrophils Absolute 11.56 (H) 1.60 - 5.50 x10*3/uL    Immature Granulocytes Absolute, Automated 0.24 0.00 - 0.50 x10*3/uL    Lymphocytes Absolute 0.75 (L) 0.80 - 3.00 x10*3/uL    Monocytes Absolute 0.58 0.05 - 0.80 x10*3/uL    Eosinophils Absolute 0.05 0.00 - 0.40 x10*3/uL    Basophils Absolute 0.03 0.00 - 0.10 x10*3/uL   Comprehensive metabolic panel   Result Value Ref Range    Glucose 186 (H) 74 - 99 mg/dL    Sodium 132 (L) 136 - 145 mmol/L    Potassium 3.3 (L) 3.5 - 5.3 mmol/L    Chloride 103 98 - 107 mmol/L    Bicarbonate 22 21 - 32 mmol/L    Anion Gap 10 10 - 20 mmol/L    Urea Nitrogen 20 6 - 23 mg/dL    Creatinine 0.84 0.50 - 1.05 mg/dL    eGFR 68 >60 mL/min/1.73m*2    Calcium 8.2 (L) 8.6 - 10.3 mg/dL    Albumin 2.7 (L) " 3.4 - 5.0 g/dL    Alkaline Phosphatase 186 (H) 33 - 136 U/L    Total Protein 4.7 (L) 6.4 - 8.2 g/dL    AST 19 9 - 39 U/L    Bilirubin, Total 1.4 (H) 0.0 - 1.2 mg/dL    ALT 7 7 - 45 U/L   Troponin I, High Sensitivity   Result Value Ref Range    Troponin I, High Sensitivity 14 (H) 0 - 13 ng/L   Protime-INR   Result Value Ref Range    Protime 11.9 9.8 - 12.8 seconds    INR 1.0 0.9 - 1.1   APTT   Result Value Ref Range    aPTT 23 (L) 27 - 38 seconds   Magnesium   Result Value Ref Range    Magnesium 2.01 1.60 - 2.40 mg/dL   POCT GLUCOSE   Result Value Ref Range    POCT Glucose 165 (H) 74 - 99 mg/dL   ECG 12 lead   Result Value Ref Range    Ventricular Rate 88 BPM    Atrial Rate 88 BPM    RI Interval 158 ms    QRS Duration 76 ms    QT Interval 392 ms    QTC Calculation(Bazett) 474 ms    P Axis 75 degrees    R Axis -37 degrees    T Axis 73 degrees    QRS Count 14 beats    Q Onset 211 ms    P Onset 132 ms    P Offset 177 ms    T Offset 407 ms    QTC Fredericia 445 ms   C. difficile, PCR    Specimen: Stool   Result Value Ref Range    C. difficile, PCR Not Detected Not Detected   Troponin I, High Sensitivity   Result Value Ref Range    Troponin I, High Sensitivity 17 (H) 0 - 13 ng/L   Blood Culture    Specimen: Peripheral Venipuncture; Blood culture   Result Value Ref Range    Blood Culture Loaded on Instrument - Culture in progress    Blood Culture    Specimen: Peripheral Venipuncture; Blood culture   Result Value Ref Range    Blood Culture Loaded on Instrument - Culture in progress    Lactate   Result Value Ref Range    Lactate 3.1 (H) 0.4 - 2.0 mmol/L   Sars-CoV-2 PCR   Result Value Ref Range    Coronavirus 2019, PCR Not Detected Not Detected   Influenza A, and B PCR   Result Value Ref Range    Flu A Result Not Detected Not Detected    Flu B Result Not Detected Not Detected   RSV PCR   Result Value Ref Range    RSV PCR Not Detected Not Detected   Lactate   Result Value Ref Range    Lactate 2.0 0.4 - 2.0 mmol/L   Phosphorus    Result Value Ref Range    Phosphorus 3.3 2.5 - 4.9 mg/dL   Lavender Top   Result Value Ref Range    Extra Tube Hold for add-ons.    SST TOP   Result Value Ref Range    Extra Tube Hold for add-ons.    C-reactive protein   Result Value Ref Range    C-Reactive Protein 2.05 (H) <1.00 mg/dL   Folate   Result Value Ref Range    Folate, Serum 14.4 >5.0 ng/mL   Sedimentation rate, automated   Result Value Ref Range    Sedimentation Rate 11 0 - 30 mm/h   TSH   Result Value Ref Range    Thyroid Stimulating Hormone 3.19 0.44 - 3.98 mIU/L   Vitamin B12   Result Value Ref Range    Vitamin B12 393 211 - 911 pg/mL     US gallbladder    Result Date: 9/12/2024  STUDY: Right Upper Quadrant Ultrasound; 9/12/2024 12:12 PM INDICATION: Nausea/vomiting.  Abdominal pain. COMPARISON: CT A/P 9/12/2024. ACCESSION NUMBER(S): [id number] ORDERING CLINICIAN: REED HOPPER TECHNIQUE: Ultrasound of the Right Upper Quadrant. FINDINGS: LIVER: The liver is heterogeneous in echotexture, nonspecific.   GALLBLADDER: The gallbladder contains a small polyp measuring 0.3 x 0.2 cm.  There are no stones.  There is no pericholecystic fluid or wall thickening.   BILE DUCTS: The common bile duct measures 0.5 cm.  There is no intrahepatic biliary dilatation.   PANCREAS: The pancreas is not well seen due to overlying bowel gas.  RIGHT KIDNEY: The right kidney measures 8.6 cm in length.  Renal cortical echotexture is normal.  There is is dilatation of the right renal pelvis. There are no stones.  There are no cysts.    Nonspecific heterogeneous/coarse hepatic parenchyma which may be seen in the setting of fatty infiltration or other underlying hepatocellular process. Correlation is recommended. Gallbladder polyp measuring 0.3 x 0.2 cm.  The biliary tree is not significantly dilated on this examination. However, if biliary enzymes are abnormal, MRCP correlation is advised. Right renal pelvis is dilated which is likely secondary to a normal variant  extrarenal pelvis. Recent abdominal CT did not show any hydronephrosis. Signed by Kavon Cuevas MD    ECG 12 lead    Result Date: 9/12/2024  Sinus rhythm with Premature atrial complexes Left axis deviation Nonspecific ST and T wave abnormality Abnormal ECG When compared with ECG of 12-SEP-2024 09:52, (unconfirmed) Premature atrial complexes are now Present QRS axis Shifted left T wave inversion no longer evident in Inferior leads Nonspecific T wave abnormality now evident in Anterolateral leads    CT abdomen pelvis w IV contrast    Addendum Date: 9/12/2024    This finding was discussed with and acknowledged by GIO Rosario on 9/12/2024 at 10:22 AM Signed by Jens Espana MD    Result Date: 9/12/2024  STUDY: CT Abdomen and Pelvis with IV Contrast; 9/12/2024 at 9:46 AM. INDICATION: Abdominal pain. COMPARISON: XR right hip/pelvis 9/8/2024; XR right femur 9/8/2024. ACCESSION NUMBER(S): [id number] ORDERING CLINICIAN: REED HOPPER TECHNIQUE: CT of the abdomen and pelvis was performed.  Contiguous axial images were obtained at 3 mm slice thickness through the abdomen and pelvis. Coronal and sagittal reconstructions at 3 mm slice thickness were performed.  Omnipaque 350 69 mL was administered intravenously.  FINDINGS: LOWER CHEST: No cardiomegaly.  No pericardial effusion.  Lung bases are clear.  ABDOMEN:  LIVER: No hepatomegaly.  Smooth surface contour.  Normal attenuation.  BILE DUCTS: No intrahepatic biliary dilatation. The common bile duct measures 8 mm in diameter.  GALLBLADDER: The gallbladder is not clearly identified in the gallbladder fossa. However, there is a thick-walled, somewhat tubular structure in the right upper abdomen which may represent the gallbladder. STOMACH: There is a small hiatal hernia.  PANCREAS: No masses or ductal dilatation.  SPLEEN: No splenomegaly or focal splenic lesion.  ADRENAL GLANDS: No thickening or nodules.  KIDNEYS AND URETERS: Kidneys are normal in size and  location.  No renal or ureteral calculi.  PELVIS:  BLADDER: No abnormalities identified.  REPRODUCTIVE ORGANS: No abnormalities identified.  BOWEL: No abnormalities identified.  VESSELS: No abnormalities identified.  Abdominal aorta is normal in caliber.  PERITONEUM/RETROPERITONEUM/LYMPH NODES: No free fluid.  No pneumoperitoneum. No lymphadenopathy.  ABDOMINAL WALL: No abnormalities identified. SOFT TISSUES: There is a large mass in the region of the right hip. This extends through the subcutaneous fat and appears to invade the musculature around the right hip. This is only partially visualized, but measures at least 5.4 x 2.7 x 8.0 cm.  BONES: No acute fracture or aggressive osseous lesion. There is no fracture of the right symphysis pubis as well as a fixed fracture of the right hip.    1. There is a large mass in the region of the right hip. This extends through the subcutaneous fat and appears to invade the musculature around the right hip. This is only partially visualized, but measures at least 5.4 x 2.7 x 8.0 cm. 2. The gallbladder is not clearly identified in the gallbladder fossa. However, there is a thick-walled, somewhat tubular structure in the right upper abdomen which may represent the gallbladder. The common bile duct is slightly dilated which may be due to biliary obstruction. Signed by Jens Espana MD    CT brain attack head wo IV contrast    Result Date: 9/12/2024  Interpreted By:  Asha Roach, STUDY: CT BRAIN ATTACK HEAD WO IV CONTRAST;  9/12/2024 9:40 am   INDICATION: Signs/Symptoms:Stroke Evaluation.   COMPARISON: 09/08/2024   ACCESSION NUMBER(S): [id number]   ORDERING CLINICIAN: REED HOPPER   TECHNIQUE: Examination was performed in the axial plane using soft tissue and bone algorithm.   FINDINGS: INTRACRANIAL: There is prominence of the ventricular system and cerebral sulci consistent with cerebral atrophy. There are periventricular hypodensities consistent with  marked small  vessel disease. No mass or mass effect is identified. There is no hemorrhage or subdural fluid collection. There is no acute infarct.     EXTRACRANIAL: Visualized paranasal sinuses and mastoids are clear.       1. No acute intracranial pathology. 2. Cerebral atrophy and marked chronic small vessel disease.   MACRO: Asha Roach discussed the significance and urgency of this critical finding by secure chat with  REED HOPPER on 9/12/2024 at 9:46 am.  (**-RCF-**) Findings:  See findings.   Signed by: Asha Roach 9/12/2024 9:46 AM Dictation workstation:   [id number]          Assessment/Plan   Assessment & Plan  Syncope, unspecified syncope type             83-year-old female with history of  Dementia  Osteoporosis  UTI  Falls  Right hip surgery due to fracture  Presented with  Diarrhea dehydration, possible viral illness  Hyponatremia  Hypokalemia  Volume depletion  Right hip mass  Right lower extremity swelling  Troponin leak from nonischemic myocardial injury  Lactic acidosis, improved  Hypotension, improved  Leukocytosis  Anemia   Near syncope/syncope likely vasovagal  Malnutrition  Failure to thrive  Ambulatory dysfunction  Plan  Monitor on telemetry  Follow CBC CMP  Discussed with orthopedic surgery, accepted for transfer to [facility]  Patient to be evaluated for right hip mass  Recent surgery for right hip fracture possibly  Check urinalysis, follow white count  Hydrate with normal saline at 100 cc/h  Follow stool output  Check stool culture  Continue aspirin for DVT prophylaxis likely  Troponin leak from nonischemic myocardial injury, will check echocardiogram  To monitor  Asymptomatic apparently  To follow orthopedic oncology/surgical oncology for evaluation of right hip mass  Discussed with family   Follow vitals  Lab workup  Check venous duplex ultrasound lower extremity to evaluate DVT  TSH, B12, folate level, iron panel serum ferritin, stool FOBT  Continue outpatient medicines  Iron  supplement  On Namenda for dementia  Replace potassium for hypokalemia  Diet nutrition as tolerated  SLP if necessary   transfer to [facility] when bed available  Continue home medicines as tolerated  PT  Consider palliative care  Further management as clinical course evolves              Orlando Whitman MD

## 2024-09-13 NOTE — PROGRESS NOTES
09/13/24 4083   Discharge Planning   Living Arrangements Alone  ([facility])   Support Systems Spouse/significant other;Children   Assistance Needed Assist X 1-2   Type of Residence Private residence   Number of Stairs to Enter Residence 0   Number of Stairs Within Residence 0   Do you have animals or pets at home? No   Who is requesting discharge planning? Provider   Home or Post Acute Services Post acute facilities (Rehab/SNF/etc)   Type of Post Acute Facility Services Rehab   Expected Discharge Disposition [place]  (Transfer to [facility])   Does the patient need discharge transport arranged? Yes   RoundTrip coordination needed? Yes   Has discharge transport been arranged? No   Financial Resource Strain   How hard is it for you to pay for the very basics like food, housing, medical care, and heating? Pt Unable   Housing Stability   In the last 12 months, was there a time when you were not able to pay the mortgage or rent on time? Pt Unable   At any time in the past 12 months, were you homeless or living in a shelter (including now)? Pt Unable   Transportation Needs   In the past 12 months, has lack of transportation kept you from medical appointments or from getting medications? Pt Unable   In the past 12 months, has lack of transportation kept you from meetings, work, or from getting things needed for daily living? Pt Unable     Care Transitions: Patient chart reviewed and spoke to Dr. Whitman via secure chat. Patient is being transferred to [facility] once bed available. Met with patient and spouse Brandon at bedside. Patient has been at [facility] for a short time after his surgery on 9/2/24. Spouse states she has been having frequent falls. Discharge plan is to return to [facility] from [facility]. Will send return referral through [employer] to [facility] with updates and transfer of care notice. Medicare IMM reviewed, patient spouse signed, copy provided, original placed  in chart. Voiced understanding. Care team available upon request. Wendie Alejo RN/TCC

## 2024-09-13 NOTE — PROGRESS NOTES
Physical Therapy    Physical Therapy Evaluation    Patient Name: Dinah Parmar  MRN: [id number]  Today's Date: 9/13/2024   Time Calculation  Start Time: 0845  Stop Time: 0914  Time Calculation (min): 29 min    Assessment/Plan   Skilled PT intervention is indicated due to patient presents with deficits in bed mobility, transfers, gait, stair negotiation, strength, activity tolerance, and safety awareness.   Patient globally weak and deconditioned and needs total assist for mobility.  Recommend extended physical therapy intervention to improve strength, balance, mobility and reduce fall risk. Continue transfers bed-chair with squat pivot, progress to standing and transfers as able.    PT Assessment  PT Assessment Results: Decreased strength, Decreased endurance, Impaired balance, Decreased mobility, Decreased cognition, Impaired judgement, Decreased safety awareness  Rehab Prognosis: Fair (due to dementia)  Barriers to Discharge: dementia, inability to ambulate  Evaluation/Treatment Tolerance: Patient limited by fatigue  Barriers to Participation: Comorbidities  End of Session Communication: Bedside nurse (also communication on white board)  End of Session Patient Position: Up in chair, On cart  IP OR SWING BED PT PLAN  Inpatient or Swing Bed: Inpatient  PT Plan  Treatment/Interventions: Bed mobility, Transfer training, Balance training, Neuromuscular re-education, Strengthening, Endurance training, Therapeutic exercise, Therapeutic activity  PT Plan: Ongoing PT  PT Frequency: 4 times per week  PT Discharge Recommendations: Moderate intensity level of continued care  Equipment Recommended upon Discharge: Wheelchair  PT Recommended Transfer Status: Total assist  PT - OK to Discharge: Yes (once medically appropriate)    Subjective     Current Problem:  Patient Active Problem List   Diagnosis    Kyphosis (acquired) (postural)    Vaginal dryness    Vascular dementia without behavioral disturbance (Multi)    Age-related  "osteoporosis without current pathological fracture    Urge incontinence of urine    Generalized weakness    Vitamin D deficiency    Pain of right clavicle    Fall in home    Arthralgia of hip    Acute low back pain    Syncope, unspecified syncope type       General Visit Information:  General  Reason for Referral: impaired mobilty; 83 y.o. female presenting with dehydration, diarrhea and syncope with volume depletion along with hypokalemia, hyponatremia. Failure to thrive and dementia. Recurrent recent falls. She had right hip fracture surgery (9/2/24) from unwitnessed fall.IMPRESSION: 1. There is a large mass in the region of the right hip. pt plan to transfer to [facility] once a bed is available  Referred By: J Carlos  Past Medical History Relevant to Rehab: itis charted that pt had a right clavicular fx but  denies knowing of this. pt is using RUE WFL with no c/o pain  Family/Caregiver Present: Yes (spouse)  Caregiver Feedback: \"She has a touch of dementia\"  Co-Treatment: OT  Co-Treatment Reason: to maximize patient safety with mobility  Prior to Session Communication: Bedside nurse  Patient Position Received: Bed, 3 rail up, Alarm on  General Comment: pt confused and does not understand the point of therapy visit. pt needs tactile cues with difficulty following verbal commands. pt somewhat uncooperative but eventually will follow through some    Home Living:  Home Living  Home Living Comments: PLOF information is taken from pt's  as pt is unable to answer any questions due to cognition.  states that pt was living at home with him in a mobile with 4 PATRICIA. She was walking with no device and he helped her occasionally with ADLs. pt was sent to [facility] after a hospital stay. She then fell at the SNF and 6 days later they found that she had a hip fracture and was transferred to [facility]. pt has know been at [facility] for 2 weeks.    Prior Level of Function:  Prior Function Per " Pt/Caregiver Report  ADL Assistance: Needs assistance  Ambulatory Assistance: Needs assistance (spouse states she was standing with therapy at SNF)    Precautions:  Precautions  LE Weight Bearing Status: Weight Bearing as Tolerated  Medical Precautions: Fall precautions    Vital Signs:  Vital Signs  Heart Rate: 75  SpO2: 92 %  Objective     Pain:  Pain Assessment  Pain Assessment: Unable to self-report (no signs of pain other than grabbing R thigh at end of session)    Cognition:  Cognition  Overall Cognitive Status: Impaired at baseline  Orientation Level: Disoriented X4 (unbable to provide name or )  Memory: Exceptions to WFL  Short-Term Memory: Impaired  Insight: Severe    General Assessments:      Activity Tolerance  Endurance: Decreased tolerance for upright activites                 Static Sitting Balance  Static Sitting-Balance Support: Feet supported, Bilateral upper extremity supported  Static Sitting-Level of Assistance: Moderate assistance       Functional Assessments:     Bed Mobility 1  Bed Mobility 1: Supine to sitting  Level of Assistance 1: Dependent, +2  Bed Mobility Comments 1: HOB up  Transfers  Transfer: Yes  Transfer 1  Transfer From 1: Bed to  Transfer to 1: Chair with arms  Technique 1: Squat pivot  Transfer Device 1: Gait belt  Transfer Level of Assistance 1: Maximum assistance, Dependent, +2  Trials/Comments 1: patient needing handoverhand to assist with transfers  Transfers 2  Transfer From 2: Sit to, Stand to  Transfer to 2: Stand, Sit  Transfer Device 2: Walker, Gait belt  Transfer Level of Assistance 2: Dependent, +2  Trials/Comments 2: attempted sit to stand from EOB to FWW.  pt unable to safely bear weight through BLEs, unable to get B hips off of bed.  Ambulation/Gait Training  Ambulation/Gait Training Performed: No (unable)                Outcome Measures:  [facility] Basic Mobility  Turning from your back to your side while in a flat bed without using bedrails: Total  Moving from  lying on your back to sitting on the side of a flat bed without using bedrails: Total  Moving to and from bed to chair (including a wheelchair): Total  Standing up from a chair using your arms (e.g. wheelchair or bedside chair): Total  To walk in hospital room: Total  Climbing 3-5 steps with railing: Total  Basic Mobility - Total Score: 6                            Goals:  Encounter Problems       Encounter Problems (Active)       PT Problem       PT Goal 1       Start:  09/13/24    Expected End:  09/26/24       Dinah Parmar will perform bed mobility for supine to and from sitting EOB without use of rail with modA x1           PT Goal 2       Start:  09/13/24    Expected End:  09/26/24       Dinah Parmar will transfer sit to and from stand using least restrictive assistive device modA x1           PT Goal 3       Start:  09/13/24    Expected End:  09/26/24       Dinah Parmar will transfer squat pivot bed to/from chair with modA x1              Pain - Adult            Education Documentation  Mobility Training, taught by Harika Loja, PT at 9/13/2024  2:54 PM.  Learner: Significant Other, Patient  Readiness: Acceptance  Method: Explanation, Demonstration  Response: No Evidence of Learning, Needs Reinforcement, Verbalizes Understanding  Comment: spouse understood, patient did not due to dementia; safety/mobility/transfers    Education Comments  No comments found.

## 2024-09-13 NOTE — SIGNIFICANT EVENT
Bedside nurse brought to my attention that urinalysis results are back and it came back positive for leukocyte esterase and WBC.  I will go ahead and start ceftriaxone 1 g IV daily and follow-up with a urine culture final report.  I will also order repeat CBC and BMP for today.

## 2024-09-13 NOTE — PROGRESS NOTES
"Subjective Data:  Patient examined at bedside; alert to self. She does not appear in any acute distress. Staples intact to right upper leg. Patient remains hemodynamically stable          Objective Data:  Last Recorded Vitals:  Vitals:    09/13/24 0000 09/13/24 0300 09/13/24 0705 09/13/24 0723   BP:  128/70  141/67   BP Location:    Right arm   Patient Position:  Lying  Lying   Pulse:  90  100   Resp:  18  17   Temp:  36.3 °C (97.3 °F)  37.5 °C (99.5 °F)   TempSrc:  Temporal  Temporal   SpO2: 95% 92% 91% 92%   Weight:       Height:           Last Labs:  CBC - 9/12/2024:  9:48 AM  13.2 8.1 405    25.8      CMP - 9/12/2024:  9:48 AM  8.2 4.7 19 --- 1.4   3.3 2.7 7 186      PTT - 9/12/2024:  9:48 AM  1.0   11.9 23     TROPHS   Date/Time Value Ref Range Status   09/12/2024 11:00 AM 17 0 - 13 ng/L Final   09/12/2024 09:48 AM 14 0 - 13 ng/L Final   09/07/2024 05:35 PM 22 0 - 13 ng/L Final     BNP   Date/Time Value Ref Range Status   09/07/2024 04:05  0 - 99 pg/mL Final      Last I/O:  I/O last 3 completed shifts:  In: 1543.3 (32.3 mL/kg) [I.V.:1443.3 (30.2 mL/kg); IV Piggyback:100]  Out: 0 (0 mL/kg)   Weight: 47.7 kg     Past Cardiology Tests (Last 3 Years):  EKG:  ECG 12 lead 09/12/2024 (Preliminary)      ECG 12 lead 09/07/2024 (Preliminary)    Echo:  No results found for this or any previous visit from the past 1095 days.    Ejection Fractions:  No results found for: \"EF\"  Cath:  No results found for this or any previous visit from the past 1095 days.    Stress Test:  No results found for this or any previous visit from the past 1095 days.    Cardiac Imaging:  No results found for this or any previous visit from the past 1095 days.      Inpatient Medications:  Scheduled medications   Medication Dose Route Frequency    ascorbic acid  500 mg oral Daily    aspirin  325 mg oral BID    cefTRIAXone  1 g intravenous q24h    ferrous sulfate (325 mg ferrous sulfate)  65 mg of iron oral Daily    memantine  10 mg oral BID "     PRN medications   Medication    ketoconazole    oxygen     Continuous Medications   Medication Dose Last Rate    sodium chloride 0.9%  100 mL/hr 100 mL/hr (09/13/24 0600)       Physical Exam:  General: No acute distress.   Skin: Skin is warm, dry and intact without rashes or lesions.   HEENT: normocephalic, atraumatic; conjunctivae are clear without exudates or hemorrhage. Sclera is non-icteric. Eyelids are normal in appearance without swelling or lesions. Hearing intact. Nares are patent bilaterally. Moist mucous membranes.   Cardiovascular: heart rate and rhythm are normal. No murmurs, gallops, or rubs are auscultated.   Respiratory: bilateral lung sounds clear to auscultation  Vascular: nonpitting edema to RLE; staples intact to right upper leg  Neurological: unable to assess  Psychiatric: calm       Assessment/Plan     Syncope:  -Likely triggered by underlying dehydration, electrolyte abnormalities, and volume depletion  -Patient alert to self so unable to ask further questions about her episodes; will order carotid duplex  -Echo ordered with results pending  -Patient recently had right hip repair and is on PRN narcotics; would caution with use of narcotics given age and underlying dementia; recommend scheduled Tylenol for pain control   -Patient likely to get discharged over the weekend, therefore, a monitor will not be able to be placed  -Recommend patient follow up with our office after discharge    Peripheral IV 09/12/24 20 G Left Antecubital (Active)   Site Assessment Clean;Dry;Intact 09/13/24 0800   Dressing Type Transparent 09/13/24 0800   Line Status Saline locked;Flushed 09/13/24 0800   Dressing Status Clean;Dry;Occlusive 09/13/24 0800   Number of days: 1       Peripheral IV 09/12/24 22 G Right Antecubital (Active)   Site Assessment Clean;Dry;Intact 09/13/24 0800   Dressing Type Transparent 09/13/24 0800   Line Status Infusing 09/13/24 0800   Dressing Status Clean;Dry;Occlusive 09/13/24 0800    Number of days: 1       Code Status:  Full Code    Thank you for allowing me to participate in the care of this patient. Please reach me out if you have any questions or if you need any clarifications regarding the patient's care.          Melinda Davenport, APRN-CNP, DNP

## 2024-09-13 NOTE — CARE PLAN
Problem: Nutrition  Goal: Oral intake greater than 50%  Outcome: Progressing  Goal: Oral intake greater 75%  Outcome: Progressing  Goal: Consume prescribed supplement  Outcome: Progressing  Goal: Adequate PO fluid intake  Outcome: Progressing  Goal: Nutrition support is meeting 75% of nutrient needs  Outcome: Progressing  Goal: BG  mg/dL  Outcome: Progressing  Goal: Lab values WNL  Outcome: Progressing  Goal: Electrolytes WNL  Outcome: Progressing  Goal: Promote healing  Outcome: Progressing  Goal: Maintain stable weight  Outcome: Progressing     Problem: Fall/Injury  Goal: Not fall by end of shift  Outcome: Progressing  Goal: Be free from injury by end of the shift  Outcome: Progressing  Goal: Verbalize understanding of personal risk factors for fall in the hospital  Outcome: Progressing  Goal: Verbalize understanding of risk factor reduction measures to prevent injury from fall in the home  Outcome: Progressing  Goal: Use assistive devices by end of the shift  Outcome: Progressing  Goal: Pace activities to prevent fatigue by end of the shift  Outcome: Progressing     Problem: Pain - Adult  Goal: Verbalizes/displays adequate comfort level or baseline comfort level  Outcome: Progressing     Problem: Safety - Adult  Goal: Free from fall injury  Outcome: Progressing     Problem: Discharge Planning  Goal: Discharge to home or other facility with appropriate resources  Outcome: Progressing     Problem: Chronic Conditions and Co-morbidities  Goal: Patient's chronic conditions and co-morbidity symptoms are monitored and maintained or improved  Outcome: Progressing     Problem: Skin  Goal: Decreased wound size/increased tissue granulation at next dressing change  Outcome: Progressing  Flowsheets (Taken 9/13/2024 1116)  Decreased wound size/increased tissue granulation at next dressing change: Promote sleep for wound healing  Goal: Participates in plan/prevention/treatment measures  Outcome: Progressing  Flowsheets  (Taken 9/13/2024 1116)  Participates in plan/prevention/treatment measures:   Elevate heels   Discuss with provider PT/OT consult   Increase activity/out of bed for meals  Goal: Prevent/manage excess moisture  Outcome: Progressing  Flowsheets (Taken 9/13/2024 1116)  Prevent/manage excess moisture:   Cleanse incontinence/protect with barrier cream   Follow provider orders for dressing changes   Monitor for/manage infection if present  Goal: Prevent/minimize sheer/friction injuries  Outcome: Progressing  Flowsheets (Taken 9/13/2024 1116)  Prevent/minimize sheer/friction injuries:   Use pull sheet   Complete micro-shifts as needed if patient unable. Adjust patient position to relieve pressure points, not a full turn   Increase activity/out of bed for meals   HOB 30 degrees or less   Turn/reposition every 2 hours/use positioning/transfer devices  Goal: Promote/optimize nutrition  Outcome: Progressing  Flowsheets (Taken 9/13/2024 1116)  Promote/optimize nutrition:   Assist with feeding   Consume > 50% meals/supplements   Offer water/supplements/favorite foods   Monitor/record intake including meals  Goal: Promote skin healing  Outcome: Progressing  Flowsheets (Taken 9/13/2024 1116)  Promote skin healing:   Assess skin/pad under line(s)/device(s)   Protective dressings over bony prominences   Turn/reposition every 2 hours/use positioning/transfer devices    The clinical goals for the shift include reorientation, get stool sample

## 2024-09-13 NOTE — PROGRESS NOTES
Occupational Therapy    Evaluation    Patient Name: Dinah Parmar  MRN: [id number]  Today's Date: 9/13/2024  Time Calculation  Start Time: 0845  Stop Time: 0915  Time Calculation (min): 30 min  319/319-A    Assessment  IP OT Assessment  OT Assessment: pt presents with deficits in cognition, strength, balance, safety/judgement needing assist of two for most aspects of ADL and mobility. Recommend OT services to ensure max potential and indep in ADLs  Prognosis: Good  Barriers to Discharge: None  Evaluation/Treatment Tolerance: Other (Comment) (limited by cognition)  Medical Staff Made Aware: Yes  End of Session Communication: Bedside nurse (communication on white board)  End of Session Patient Position: Up in chair, Alarm on    Plan:  Treatment Interventions: ADL retraining, Functional transfer training, UE strengthening/ROM, Endurance training, Cognitive reorientation, Patient/family training, Equipment evaluation/education, Neuromuscular reeducation  OT Frequency: 3 times per week  OT Discharge Recommendations: Moderate intensity level of continued care  OT Recommended Transfer Status: Assist of 2  OT - OK to Discharge: Yes (once medically appropriate)    Subjective     Current Problem:  1. Syncope, unspecified syncope type  Vascular US carotid artery duplex bilateral    Vascular US carotid artery duplex bilateral      2. Dehydration        3. Diarrhea, unspecified type        4. Generalized abdominal pain        5. Hypokalemia        6. Anemia, unspecified type        7. Swelling  Lower extremity venous duplex bilateral    Lower extremity venous duplex bilateral      8. Swelling of both lower extremities  Lower extremity venous duplex bilateral    Lower extremity venous duplex bilateral    Lower extremity venous duplex bilateral    Lower extremity venous duplex bilateral      9. Syncope and collapse  Transthoracic Echo (TTE) Complete    Transthoracic Echo (TTE) Complete      10. Unspecified systolic (congestive) heart  "failure (Multi)  Transthoracic Echo (TTE) Complete          General:  General  Reason for Referral: Dinah Parmar is a 83 y.o. female presenting with dehydration, diarrhea and syncope with volume depletion along with hypokalemia, hyponatremia.  Failure to thrive and dementia.  Recurrent recent falls.  She had right hip fracture surgery (9/2/24) from unwitnessed fall.IMPRESSION:  1. There is a large mass in the region of the right hip. This extends  through the subcutaneous fat and appears to invade the musculature  around the right hip. This is only partially visualized, but measures  at least 5.4 x 2.7 x 8.0 cm.  2. The gallbladder is not clearly identified in the gallbladder fossa.  However, there is a thick-walled, somewhat tubular structure in the  right upper abdomen which may represent the gallbladder. The common  bile duct is slightly dilated which may be due to biliary obstruction.  pt plan to transfer to [facility] once a bed is available  Referred By: J Carlos  Past Medical History Relevant to Rehab: itis charted that pt had a right clavicular fx but  denies knowing of this.  pt is using RUE WFL with no c/o pain  Family/Caregiver Present: Yes (pt's  \"Brandon\")  Caregiver Feedback: \"She has a touch of dementia\"  Co-Treatment: PT  Co-Treatment Reason: to maximize pt safety  Prior to Session Communication: Bedside nurse  Patient Position Received: Bed, 3 rail up, Alarm on  General Comment: pt confused and does not understand the point of therapy visit.  pt needs tactile cues with difficulty following verbal commands.  pt somewhat uncooperative but eventually will follow through some    Precautions:  LE Weight Bearing Status: Weight Bearing as Tolerated  Medical Precautions: Fall precautions    Pain:  Pain Assessment  Pain Assessment: 0-10 (pt unable to answer questions regarding pain)    Objective     Cognition:  Overall Cognitive Status: Impaired at baseline  Orientation Level: Disoriented to place, " Disoriented to time, Disoriented to situation  Memory: Exceptions to WFL  Short-Term Memory: Impaired  Problem Solving: Exceptions to WFL  Simple Functional Tasks: Impaired  Safety/Judgement: Exceptions to WFL  Routine Tasks: Maximal  Insight: Severe             Home Living:  Home Living Comments: PLOF information is taken from pt's  as pt is unable to answer any questions due to cognition.    states that pt was living at home with him in a mobile with 4 PATRICIA.  She was walking with no device and he helped her occasionally with ADLs.  pt was sent to [facility] after a hospital stay.  She then fell at the SNF and 6 days later they found that she had a hip fracture and was transferred to [facility].  pt has know been at [facility] for 2 weeks.     ADL:  Eating Assistance: Minimal  Grooming Assistance: Moderate  Bathing Assistance: Maximal  UE Dressing Assistance: Moderate  LE Dressing Assistance: Total  Toileting Assistance with Device: Total    Activity Tolerance:  Endurance: Decreased tolerance for upright activites    Bed Mobility/Transfers:   Bed Mobility  Bed Mobility: Yes  Bed Mobility 1  Bed Mobility 1: Supine to sitting  Level of Assistance 1: +2, Dependent  Bed Mobility Comments 1: with HOB up.  pt has difficulty following commands  Transfers  Transfer: Yes  Transfer 1  Transfer From 1: Bed to  Transfer to 1: Chair with arms  Technique 1: Squat pivot  Transfer Device 1: Gait belt  Transfer Level of Assistance 1: Maximum assistance, Dependent, +2  Trials/Comments 1: initially pt holding onto bed with RUE and needed physical assist to let go  Transfers 2  Transfer From 2: Sit to, Stand to  Transfer to 2: Sit, Stand  Transfer Device 2: Walker, Gait belt  Transfer Level of Assistance 2: Dependent, +2  Trials/Comments 2: attempted sit to stand from EOB to FWW.  pt unable to safely bear weight through BLEs, unable to get B hips off of bed.    Ambulation/Gait Training:  Functional  Mobility  Functional Mobility Performed: No (pt unable)    Sitting Balance:  Static Sitting Balance  Static Sitting-Level of Assistance: Contact guard    Vision: Vision - Basic Assessment  Current Vision: No visual deficits    Sensation:  Light Touch: No apparent deficits    Strength:  Strength Comments: pt unable to follow commands for MMT      Coordination:  Movements are Fluid and Coordinated:  (unable to test due to cognition)     Outcome Measures: [facility] Daily Activity  Putting on and taking off regular lower body clothing: Total  Bathing (including washing, rinsing, drying): A lot  Putting on and taking off regular upper body clothing: A lot  Toileting, which includes using toilet, bedpan or urinal: Total  Taking care of personal grooming such as brushing teeth: A little  Eating Meals: A little  Daily Activity - Total Score: 12                       EDUCATION:     Education Documentation  Precautions, taught by Lindsay Melchor OT at 9/13/2024 12:16 PM.  Learner: Patient  Readiness: Eager  Method: Explanation, Demonstration  Response: No Evidence of Learning  Comment: pt unable to be educated due to cognitive status    Body Mechanics, taught by Lindsay Melchor OT at 9/13/2024 12:16 PM.  Learner: Patient  Readiness: Eager  Method: Explanation, Demonstration  Response: No Evidence of Learning  Comment: pt unable to be educated due to cognitive status    ADL Training, taught by Lindsay Melchor OT at 9/13/2024 12:16 PM.  Learner: Patient  Readiness: Eager  Method: Explanation, Demonstration  Response: No Evidence of Learning  Comment: pt unable to be educated due to cognitive status    Education Comments  No comments found.        Goals:   Encounter Problems       Encounter Problems (Active)       ADLs       Patient will perform UB bathing with minimal assist  level of assistance. (Progressing)       Start:  09/13/24    Expected End:  09/27/24               BALANCE       Pt will maintain dynamic sitting balance during ADL  task with stand by assist level of assistance in order to demonstrate decreased risk of falling and improved postural control. (Progressing)       Start:  09/13/24    Expected End:  09/27/24               TRANSFERS       Patient will perform bed mobility minimal assist  level of assistance  (Progressing)       Start:  09/13/24    Expected End:  09/27/24            Patient will complete functional transfers with moderate assist level of assistance. (Progressing)       Start:  09/13/24    Expected End:  09/27/24

## 2024-09-13 NOTE — CARE PLAN
The patient's goals for the shift include      The clinical goals for the shift include rest    Problem: Nutrition  Goal: Oral intake greater than 50%  Outcome: Progressing  Goal: Adequate PO fluid intake  Outcome: Progressing  Goal: Nutrition support is meeting 75% of nutrient needs  Outcome: Progressing  Goal: Promote healing  Outcome: Progressing     Problem: Fall/Injury  Goal: Not fall by end of shift  Outcome: Progressing  Goal: Be free from injury by end of the shift  Outcome: Progressing     Problem: Pain - Adult  Goal: Verbalizes/displays adequate comfort level or baseline comfort level  Outcome: Progressing     Problem: Discharge Planning  Goal: Discharge to home or other facility with appropriate resources  Outcome: Progressing     Problem: Safety - Adult  Goal: Free from fall injury  Outcome: Progressing     Problem: Discharge Planning  Goal: Discharge to home or other facility with appropriate resources  Outcome: Progressing     Problem: Skin  Goal: Decreased wound size/increased tissue granulation at next dressing change  Flowsheets (Taken 9/12/2024 7403)  Decreased wound size/increased tissue granulation at next dressing change: Promote sleep for wound healing  Goal: Participates in plan/prevention/treatment measures  Outcome: Progressing  Goal: Prevent/manage excess moisture  Outcome: Progressing  Goal: Prevent/minimize sheer/friction injuries  Outcome: Progressing  Goal: Promote/optimize nutrition  Outcome: Progressing  Goal: Promote skin healing  Outcome: Progressing

## 2024-09-13 NOTE — CONSULTS
"Nutrition Initial Assessment:   Nutrition Assessment    Reason for Assessment: Admission nursing screening    Patient is a 83 y.o. female presenting with dehydration and syncope. Failure to thrive and dementia. Pt is poor historian and does not answer questions well. Consulted for wt loss but Pts wts have been stable x12 months. Some lower extremity swelling noted.       Nutrition History:  Energy Intake: Poor < 50 %  Food and Nutrient History:  (Pt on clear liquid diet currently. Pt eating under 50%. Pt confused and not good at giving hx.)  Food Allergies/Intolerances:  None  GI Symptoms: None  Oral Problems: None       Anthropometrics:  Height: 160 cm (5' 3\")   Weight: 47.7 kg (105 lb 3.2 oz)   BMI (Calculated): 18.64    Weight Change %:  Weight History / % Weight Change:  (Wts stable x12 months.)    Nutrition Focused Physical Exam Findings:    Subcutaneous Fat Loss:   Orbital Fat Pads: Mild-Moderate (slight dark circles and slight hollowing)  Buccal Fat Pads: Mild-Moderate (flat cheeks, minimal bounce)  Muscle Wasting:  Temporalis: Mild-Moderate (slight depression)  Pectoralis (Clavicular Region): Mild-Moderate (some protrusion of clavicle)  Edema:  Edema: +1 trace  Physical Findings:  Hair: Negative  Eyes: Negative  Mouth: Negative  Skin: Negative (Skin noting lower extremity swelling.)    Nutrition Significant Labs:  CBC Trend:   Results from last 7 days   Lab Units 09/12/24  0948 09/07/24  1605   WBC AUTO x10*3/uL 13.2* 12.8*   RBC AUTO x10*6/uL 2.52* 2.83*   HEMOGLOBIN g/dL 8.1* 9.1*   HEMATOCRIT % 25.8* 28.3*   MCV fL 102* 100   PLATELETS AUTO x10*3/uL 405 641*    , BMP Trend:   Results from last 7 days   Lab Units 09/12/24  0948 09/07/24  1605   GLUCOSE mg/dL 186* 115*   CALCIUM mg/dL 8.2* 9.1   SODIUM mmol/L 132* 134*   POTASSIUM mmol/L 3.3* 3.9   CO2 mmol/L 22 27   CHLORIDE mmol/L 103 101   BUN mg/dL 20 9   CREATININE mg/dL 0.84 0.53    , A1C:No results found for: \"HGBA1C\"    Nutrition Specific " Medications:  ascorbic acid, 500 mg, oral, Daily  aspirin, 325 mg, oral, BID  cefTRIAXone, 1 g, intravenous, q24h  ferrous sulfate (325 mg ferrous sulfate), 65 mg of iron, oral, Daily  memantine, 10 mg, oral, BID         I/O:   Last BM Date: 09/13/24; Stool Appearance: Mucous (09/13/24 0600)    Dietary Orders (From admission, onward)       Start     Ordered    09/12/24 1506  Adult diet Clear Liquid  Diet effective now        Question:  Diet type  Answer:  Clear Liquid    09/12/24 1505                     Estimated Needs:   Total Energy Estimated Needs (kCal): 1500 kCal  Method for Estimating Needs: 25-30 kcals/kg IBW ([id number])  Total Protein Estimated Needs (g): 52 g  Method for Estimating Needs: 1g/kg IBW  Total Fluid Estimated Needs (mL): 1500 mL  Method for Estimating Needs: 1 ml/kcal        Nutrition Diagnosis   Malnutrition Diagnosis  Patient has Malnutrition Diagnosis: Yes  Diagnosis Status: New  Malnutrition Diagnosis: Mild malnutrition related to starvation  As Evidenced by: sudden poor PO intake prior to admission, <50% for >3 days, mild loss of muscle and subcutaneous fat            Nutrition Interventions/Recommendations         Nutrition Prescription:  Individualized Nutrition Prescription Provided for :  (Individual nutrition prescription of 1500 kcals and 52g of protein to be provided with diet order once diet is advanced per MD.)        Nutrition Interventions:   Interventions: Meals and snacks  Meals and Snacks:  (Follow diet order once diet is advanced per MD.)      Nutrition Monitoring and Evaluation   Food/Nutrient Related History Monitoring  Monitoring and Evaluation Plan: Energy intake  Energy Intake: Estimated energy intake  Criteria: Goal of PO intakes once diet is advanced, goal of >50% of meals once diet is advanced.    Body Composition/Growth/Weight History  Monitoring and Evaluation Plan: Weight  Weight: Weight change  Criteria: Continue to monitor wt status for wt changes.    Time Spent  (min): 60 minutes

## 2024-09-13 NOTE — NURSING NOTE
[facility] on [facility]; informed no bed at this time at [place], [place] but anticipated a bed to open this afternoon.

## 2024-09-13 NOTE — PROGRESS NOTES
"Dinah Parmar is a 83 y.o. female on day 1 of admission presenting with Syncope, unspecified syncope type.    Subjective   Dementia  Unable to obtain meaningful history       Objective     Physical Exam  General Appearance: AAO x 1  Skin: skin color pink, warm, and dry; no suspicious rashes or lesions  Eyes : PERRL, EOM's intact  ENT: mucous membranes pink and moist  Neck: normocephalic  Respiratory: lungs clear to auscultation anteriorly; no wheezing, rhonchi, or crackles.   Heart: regular rate and rhythm.   Abdomen: Nondistended, positive bowel sounds x4, soft,  nontender  Extremities: Slight right lower extremity edema, staples in right hip region intact  Peripheral pulses: normal x4 extremities  Neuro: Unable to assess, dementia does not follow commands properly  Last Recorded Vitals  Blood pressure 146/65, pulse 95, temperature 36.5 °C (97.7 °F), temperature source Temporal, resp. rate 18, height 1.6 m (5' 3\"), weight 47.7 kg (105 lb 3.2 oz), SpO2 94%.  Intake/Output last 3 Shifts:  I/O last 3 completed shifts:  In: 1543.3 (32.3 mL/kg) [I.V.:1443.3 (30.2 mL/kg); IV Piggyback:100]  Out: 0 (0 mL/kg)   Weight: 47.7 kg     Relevant Results  Scheduled medications  apixaban, 10 mg, oral, BID   Followed by  [START ON 9/20/2024] apixaban, 5 mg, oral, BID  ascorbic acid, 500 mg, oral, Daily  aspirin, 325 mg, oral, BID  cefTRIAXone, 1 g, intravenous, q24h  ferrous sulfate (325 mg ferrous sulfate), 65 mg of iron, oral, Daily  memantine, 10 mg, oral, BID      Continuous medications  sodium chloride 0.9%, 100 mL/hr, Last Rate: 100 mL/hr (09/13/24 0935)      PRN medications  PRN medications: ketoconazole, oxygen  Results for orders placed or performed during the hospital encounter of 09/12/24 (from the past 24 hour(s))   Urinalysis with Reflex Culture and Microscopic   Result Value Ref Range    Color, Urine Yellow Light-Yellow, Yellow, Dark-Yellow    Appearance, Urine Turbid (N) Clear    Specific Gravity, Urine 1.048 (N) 1.005 " - 1.035    pH, Urine 6.5 5.0, 5.5, 6.0, 6.5, 7.0, 7.5, 8.0    Protein, Urine 70 (1+) (A) NEGATIVE, 10 (TRACE), 20 (TRACE) mg/dL    Glucose, Urine Normal Normal mg/dL    Blood, Urine NEGATIVE NEGATIVE    Ketones, Urine 10 (1+) (A) NEGATIVE mg/dL    Bilirubin, Urine NEGATIVE NEGATIVE    Urobilinogen, Urine 4 (2+) (A) Normal mg/dL    Nitrite, Urine NEGATIVE NEGATIVE    Leukocyte Esterase, Urine 250 Ivette/µL (A) NEGATIVE   Microscopic Only, Urine   Result Value Ref Range    WBC, Urine 21-50 (A) 1-5, NONE /HPF    RBC, Urine 11-20 (A) NONE, 1-2, 3-5 /HPF    Squamous Epithelial Cells, Urine 10-25 (FEW) Reference range not established. /HPF    Bacteria, Urine 1+ (A) NONE SEEN /HPF    Budding Yeast, Urine PRESENT (A) NONE /HPF    Mixed Cell Casts, Urine 2+ (A) NONE /LPF   Transthoracic Echo (TTE) Complete   Result Value Ref Range    LVOT diam 1.70 cm    AV pk ban 1.90 m/s    AV mn grad 7.0 mmHg    LV Biplane EF 50 %    LA vol index A/L 9.3 ml/m2    LV EF 68 %    RV free wall pk S' 22.40 cm/s    LVIDd 3.92 cm    Aortic Valve Area by Continuity of Peak Velocity 2.31 cm2    Aortic Valve Area by Continuity of VTI 2.39 cm2    AV pk grad 14.4 mmHg    LV A4C EF 52.9    Vascular US carotid artery duplex bilateral   Result Value Ref Range    BSA 1.46 m2           Lower extremity venous duplex bilateral    Result Date: 9/13/2024  Preliminary Cardiology Report             [place] [address] Phone [phone], Fax [phone]          Preliminary Vascular Lab Report  VASC US LOWER EXTREMITY VENOUS DUPLEX BILATERAL  Patient Name:     ADRIAN Hernandes Physician:  [id number] Adal Roach MD, RPVI Study Date:       9/13/2024    Ordering Provider:  [id number] MARTELL MONTEZ MRN/PID:          [id number]     Fellow: Accession#:       [id number] Technologist:       Audra Dawson RVT, [facility] YOB: 1940   Technologist 2: Gender:           F            Encounter#:          [id number] Admission Status: Inpatient    Location Performed: [facility]  Diagnosis/ICD: Localized (leg) edema-R60.0 CPT Codes:     67081 Peripheral venous duplex scan for DVT complete  **CRITICAL RESULT** Critical Result: Positive for acute DVT of right peroneal vein and ?age of the distal right FV. Notification called to Dr Whitman on 9/13/2024 at 4:36:57 PM by Audra Dawson RVT.  PRELIMINARY CONCLUSIONS:  Right Lower Venous: There is acute occlusive deep vein thrombosis visualized in the peroneal vein. There is age indeterminate deep vein thrombosis visualized in the distal femoral vein. Technically difficult due to the patients position. Left Lower Venous: Negative for acute DVT of the visualized vessel. The calf veins are poorly visualized due to the patients position.  Imaging & Doppler Findings:  Right                 Compressible    Thrombus            Flow Distal External Iliac                   None CFV                       Yes           None       Spontaneous/Phasic PFV                       Yes           None FV Proximal               Yes           None       Spontaneous/Phasic FV Mid                    Yes           None FV Distal               Partial         ? Age Popliteal                 Yes           None       Spontaneous/Phasic Peroneal                   No      Acute occlusive PTV                       Yes           None  Left                  Compress Thrombus        Flow Distal External Iliac            None CFV                     Yes      None   Spontaneous/Phasic PFV                     Yes      None FV Proximal             Yes      None   Spontaneous/Phasic FV Mid                  Yes      None FV Distal               Yes      None Popliteal               Yes      None   Spontaneous/Phasic  VASCULAR PRELIMINARY REPORT completed by Audra Dawson RVT, [facility] on 9/13/2024 at 4:48:24 PM  ** Final **     Vascular US carotid artery duplex bilateral    Result Date: 9/13/2024  Preliminary  Cardiology [place] [address] Phone [phone], Fax [phone]      Preliminary Vascular Lab Report  VAS US CAROTID ARTERY DUPLEX BILATERAL  Patient Name:     ADRIAN AVENDAÑO PERCY Hernandes Physician:  [id number] Adal Roach MD, RPVI Study Date:       9/13/2024    Ordering Provider:  [id number] LORI BACK MRN/PID:          [id number]     Fellow: Accession#:       [id number] Technologist:       Audra Dawson RVT, RDMS YOB: 1940   Technologist 2: Gender:           F            Encounter#:         [id number] Admission Status: Inpatient    Location Performed: [facility]  Diagnosis/ICD: Other specified symptoms and signs involving the circulatory and                respiratory systems-R09.89 CPT Codes:     08042 Cerebrovascular Carotid Duplex scan complete  PRELIMINARY CONCLUSIONS:  Right Carotid: Findings are consistent with less than 50% stenosis of the right proximal internal carotid artery. Right external carotid artery appears patent with no evidence of stenosis. The right vertebral artery is patent with antegrade flow. No evidence of hemodynamically significant stenosis in the right subclavian artery. Left Carotid: Findings are consistent with less than 50% stenosis of the left proximal internal carotid artery. Left external carotid artery appears patent with no evidence of stenosis. The left vertebral artery is patent with antegrade flow. No evidence of hemodynamically significant stenosis in the left subclavian artery.  Imaging & Doppler Findings: Right Plaque Morph: The proximal right internal carotid artery demonstrates heterogenous plaque. Left Plaque Morph: The proximal left internal carotid artery demonstrates heterogenous plaque.   Right                        Left   PSV      EDV                PSV       cm/s 98 cm/s   CCA P    109 cm/s 4 cm/s 89 cm/s  10 cm/s   CCA D    102 cm/s 8 cm/s 111 cm/s 9 cm/s     ICA P    69 cm/s  13 cm/s 81 cm/s  8 cm/s    ICA M    104 cm/s 16 cm/s 109 cm/s 13 cm/s   ICA D    118 cm/s 24 cm/s 132 cm/s            ECA     118 cm/s 149 cm/s         Vertebral  92 cm/s  6 cm/s 156 cm/s         Subclavian 182 cm/s                Right Left ICA/CCA Ratio  1.2  0.7   VASCULAR PRELIMINARY REPORT completed by Audra Dawson RVT, RDMS on 9/13/2024 at 4:44:08 PM  ** Final **     Transthoracic Echo (TTE) Complete    Result Date: 9/13/2024             [place] [address] Phone [phone], Fax [phone] TRANSTHORACIC ECHOCARDIOGRAM REPORT Patient Name:      ADRIAN Hernandes Physician:    [id number] Eduin García MD Study Date:        9/13/2024             Ordering Provider:    [id number] YASH SCHAEFFER MRN/PID:           [id number]              Fellow: Accession#:        [id number]          Nurse: Date of Birth/Age: 1940 / 83 years Sonographer:          Drake Noe RDCS Gender:            F                     Additional Staff: Height:            157.48 cm             Admit Date: Weight:            47.63 kg              Admission Status:     Inpatient -                                                                Routine BSA / BMI:         1.45 m2 / 19.20 kg/m2 Department Location:  [address] Blood Pressure: 114 /65 mmHg Study Type:    TRANSTHORACIC ECHO (TTE) COMPLETE Diagnosis/ICD: Unspecified systolic (congestive) heart failure (CHF)-I50.20;                Syncope-R55 CPT Codes:     Echo Complete w Full Doppler-93146  Study Detail: The following Echo studies were performed: 2D, M-Mode, Doppler and               color flow.  PHYSICIAN INTERPRETATION: Left Ventricle: The left ventricular systolic function is normal, with a visually  estimated ejection fraction of 65-70%. There are no regional wall motion abnormalities. The left ventricular cavity size is normal. Spectral Doppler shows an impaired relaxation pattern of left ventricular diastolic filling. Left Atrium: The left atrium is normal in size. Right Ventricle: The right ventricle is normal in size. There is normal right ventricular global systolic function. Right Atrium: The right atrium is normal in size. Aortic Valve: The aortic valve is trileaflet. The aortic valve dimensionless index is 1.05. There is mild to moderate aortic valve regurgitation. The peak instantaneous gradient of the aortic valve is 14.4 mmHg. The mean gradient of the aortic valve is 7.0 mmHg. Mitral Valve: The mitral valve is normal in structure. There is no evidence of mitral valve regurgitation. Tricuspid Valve: The tricuspid valve is structurally normal. No evidence of tricuspid regurgitation. Pulmonic Valve: The pulmonic valve is not well visualized. There is no indication of pulmonic valve regurgitation. Pericardium: There is a trivial pericardial effusion. Aorta: The aortic root is normal. Systemic Veins: The inferior vena cava appears to be of normal size, IVC inspiratory collapse greater than 50%.  CONCLUSIONS:  1. The left ventricular systolic function is normal, with a visually estimated ejection fraction of 65-70%.  2. Spectral Doppler shows an impaired relaxation pattern of left ventricular diastolic filling.  3. There is normal right ventricular global systolic function.  4. Mild to moderate aortic valve regurgitation. QUANTITATIVE DATA SUMMARY:  2D MEASUREMENTS:           Normal Ranges: Ao Root d:       3.10 cm   (2.0-3.7cm) LAs:             3.00 cm   (2.7-4.0cm) IVSd:            0.87 cm   (0.6-1.1cm) LVPWd:           0.76 cm   (0.6-1.1cm) LVIDd:           3.92 cm   (3.9-5.9cm) LVIDs:           2.31 cm LV Mass Index:   63.9 g/m2 LV % FS          41.1 %  LA VOLUME:                   Normal Ranges: LA  Vol A4C:        13.2 ml   (22+/-6mL/m2) LA Vol A2C:        13.6 ml LA Vol BP:         13.5 ml LA Vol Index A4C:  9.1ml/m2 LA Vol Index A2C:  9.3 ml/m2 LA Vol Index BP:   9.3 ml/m2 LA Area A4C:       7.8 cm2 LA Area A2C:       7.8 cm2 LA Major Axis A4C: 4.0 cm LA Major Axis A2C: 3.9 cm LA Volume Index:   9.0 ml/m2 LA Vol A4C:        13.1 ml LA Vol A2C:        13.5 ml LA Vol Index BSA:  9.1 ml/m2  AORTA MEASUREMENTS:         Normal Ranges: Asc Ao, d:          3.40 cm (2.1-3.4cm)  LV SYSTOLIC FUNCTION BY 2D PLANIMETRY (MOD):                      Normal Ranges: EF-A4C View:    53 % (>=55%) EF-A2C View:    48 % EF-Biplane:     50 % EF-Visual:      68 % LV EF Reported: 68 %  LV DIASTOLIC FUNCTION:           Normal Ranges: MV e'                  0.068 m/s (>8.0) MV lateral e'          0.08 m/s MV medial e'           0.06 m/s  AORTIC VALVE:                      Normal Ranges: AoV Vmax:                1.90 m/s  (<=1.7m/s) AoV Peak P.4 mmHg (<20mmHg) AoV Mean P.0 mmHg  (1.7-11.5mmHg) LVOT Max Duarte:            1.93 m/s  (<=1.1m/s) AoV VTI:                 33.30 cm  (18-25cm) LVOT VTI:                35.00 cm LVOT Diameter:           1.70 cm   (1.8-2.4cm) AoV Area, VTI:           2.39 cm2  (2.5-5.5cm2) AoV Area,Vmax:           2.31 cm2  (2.5-4.5cm2) AoV Dimensionless Index: 1.05  RIGHT VENTRICLE: RV Basal 3.30 cm RV Mid   2.50 cm RV Major 6.6 cm RV s'    0.22 m/s  [id number] Eduin García MD Electronically signed on 2024 at 11:30:20 AM  ** Final **     US gallbladder    Result Date: 2024  STUDY: Right Upper Quadrant Ultrasound; 2024 12:12 PM INDICATION: Nausea/vomiting.  Abdominal pain. COMPARISON: CT A/P 2024. ACCESSION NUMBER(S): [id number] ORDERING CLINICIAN: REED HOPPER TECHNIQUE: Ultrasound of the Right Upper Quadrant. FINDINGS: LIVER: The liver is heterogeneous in echotexture, nonspecific.   GALLBLADDER: The gallbladder contains a small polyp measuring 0.3 x 0.2  cm.  There are no stones.  There is no pericholecystic fluid or wall thickening.   BILE DUCTS: The common bile duct measures 0.5 cm.  There is no intrahepatic biliary dilatation.   PANCREAS: The pancreas is not well seen due to overlying bowel gas.  RIGHT KIDNEY: The right kidney measures 8.6 cm in length.  Renal cortical echotexture is normal.  There is is dilatation of the right renal pelvis. There are no stones.  There are no cysts.    Nonspecific heterogeneous/coarse hepatic parenchyma which may be seen in the setting of fatty infiltration or other underlying hepatocellular process. Correlation is recommended. Gallbladder polyp measuring 0.3 x 0.2 cm.  The biliary tree is not significantly dilated on this examination. However, if biliary enzymes are abnormal, MRCP correlation is advised. Right renal pelvis is dilated which is likely secondary to a normal variant extrarenal pelvis. Recent abdominal CT did not show any hydronephrosis. Signed by Kavon Cuevas MD    ECG 12 lead    Result Date: 9/12/2024  Sinus rhythm with Premature atrial complexes Left axis deviation Nonspecific ST and T wave abnormality Abnormal ECG When compared with ECG of 12-SEP-2024 09:52, (unconfirmed) Premature atrial complexes are now Present QRS axis Shifted left T wave inversion no longer evident in Inferior leads Nonspecific T wave abnormality now evident in Anterolateral leads    CT abdomen pelvis w IV contrast    Addendum Date: 9/12/2024    This finding was discussed with and acknowledged by GIO Rosario on 9/12/2024 at 10:22 AM Signed by Jens Espana MD    Result Date: 9/12/2024  STUDY: CT Abdomen and Pelvis with IV Contrast; 9/12/2024 at 9:46 AM. INDICATION: Abdominal pain. COMPARISON: XR right hip/pelvis 9/8/2024; XR right femur 9/8/2024. ACCESSION NUMBER(S): [id number] ORDERING CLINICIAN: REED HOPPER TECHNIQUE: CT of the abdomen and pelvis was performed.  Contiguous axial images were obtained at 3 mm slice  thickness through the abdomen and pelvis. Coronal and sagittal reconstructions at 3 mm slice thickness were performed.  Omnipaque 350 69 mL was administered intravenously.  FINDINGS: LOWER CHEST: No cardiomegaly.  No pericardial effusion.  Lung bases are clear.  ABDOMEN:  LIVER: No hepatomegaly.  Smooth surface contour.  Normal attenuation.  BILE DUCTS: No intrahepatic biliary dilatation. The common bile duct measures 8 mm in diameter.  GALLBLADDER: The gallbladder is not clearly identified in the gallbladder fossa. However, there is a thick-walled, somewhat tubular structure in the right upper abdomen which may represent the gallbladder. STOMACH: There is a small hiatal hernia.  PANCREAS: No masses or ductal dilatation.  SPLEEN: No splenomegaly or focal splenic lesion.  ADRENAL GLANDS: No thickening or nodules.  KIDNEYS AND URETERS: Kidneys are normal in size and location.  No renal or ureteral calculi.  PELVIS:  BLADDER: No abnormalities identified.  REPRODUCTIVE ORGANS: No abnormalities identified.  BOWEL: No abnormalities identified.  VESSELS: No abnormalities identified.  Abdominal aorta is normal in caliber.  PERITONEUM/RETROPERITONEUM/LYMPH NODES: No free fluid.  No pneumoperitoneum. No lymphadenopathy.  ABDOMINAL WALL: No abnormalities identified. SOFT TISSUES: There is a large mass in the region of the right hip. This extends through the subcutaneous fat and appears to invade the musculature around the right hip. This is only partially visualized, but measures at least 5.4 x 2.7 x 8.0 cm.  BONES: No acute fracture or aggressive osseous lesion. There is no fracture of the right symphysis pubis as well as a fixed fracture of the right hip.    1. There is a large mass in the region of the right hip. This extends through the subcutaneous fat and appears to invade the musculature around the right hip. This is only partially visualized, but measures at least 5.4 x 2.7 x 8.0 cm. 2. The gallbladder is not clearly  identified in the gallbladder fossa. However, there is a thick-walled, somewhat tubular structure in the right upper abdomen which may represent the gallbladder. The common bile duct is slightly dilated which may be due to biliary obstruction. Signed by Jens Espana MD    CT brain attack head wo IV contrast    Result Date: 9/12/2024  Interpreted By:  Asha Roach, STUDY: CT BRAIN ATTACK HEAD WO IV CONTRAST;  9/12/2024 9:40 am   INDICATION: Signs/Symptoms:Stroke Evaluation.   COMPARISON: 09/08/2024   ACCESSION NUMBER(S): [id number]   ORDERING CLINICIAN: REED HOPPER   TECHNIQUE: Examination was performed in the axial plane using soft tissue and bone algorithm.   FINDINGS: INTRACRANIAL: There is prominence of the ventricular system and cerebral sulci consistent with cerebral atrophy. There are periventricular hypodensities consistent with  marked small vessel disease. No mass or mass effect is identified. There is no hemorrhage or subdural fluid collection. There is no acute infarct.     EXTRACRANIAL: Visualized paranasal sinuses and mastoids are clear.       1. No acute intracranial pathology. 2. Cerebral atrophy and marked chronic small vessel disease.   MACRO: Asha Roach discussed the significance and urgency of this critical finding by secure chat with  REED HOPPER on 9/12/2024 at 9:46 am.  (**-RCF-**) Findings:  See findings.   Signed by: Asha Roach 9/12/2024 9:46 AM Dictation workstation:   [id number]             Malnutrition Diagnosis Status: New  Malnutrition Diagnosis: Mild malnutrition related to starvation  As Evidenced by: sudden poor PO intake prior to admission, <50% for >3 days, mild loss of muscle and subcutaneous fat  I agree with the dietitian's malnutrition diagnosis.      All data reviewed by me independently      Assessment/Plan   Assessment & Plan  Syncope, unspecified syncope type      83-year-old female with history of  Dementia  Osteoporosis  UTI  Falls  Right hip  surgery due to fracture  Presented with  Dehydration, diarrhea, possible viral illness  Hyponatremia,  Hypokalemia  Volume depletion  Right hip as  Right lower extremity swelling and DVT present on admission  Troponin leak from nonischemic myocardial injury  Lactic acidosis, resolved  Hypotension, improved  Leukocytosis  Anemia  Near syncope, syncope likely vasovagal  Malnutrition  Failure to thrive  Ambulatory dysfunction  UTI  Plan  Monitor on telemetry  Follow CBC BMP daily  Patient awaiting transfer to [facility], to see orthopedic surgery  Right hip mass to be evaluated likely conventional conservative medical treatment at this stage  Ceftriaxone IV  Follow cultures  Hydrate with normal saline at 100 cc/h  Monitor stool output  Check stool culture  Patient was on aspirin 325 g p.o. twice daily likely for DVT prophylaxis, after Ortho surgery as I speculate with its dosing  She has acute DVT in right lower extremity  started on Eliquis, will stop aspirin at this time  Watch for any signs of bleeding  High fall risk  Palliative care planning  Troponin leak from nonischemic myocardial injury, echocardiogram with optimal EF  Cardiology following  Follow vitals  Daily CBC BMP  Continue iron supplement for anemia, iron stores okay on current supplement  On Namenda for dementia  Replace electrolytes as deemed appropriate including potassium for hypokalemia whenever necessary  Diet and SLP as deemed appropriate  Continue home medicines  TSH, B12, folate fine  PT  Transfer to [facility] when bed available  Continue current medicines  Overall prognosis guarded                            Orlando Whitman MD

## 2024-09-14 LAB
ANION GAP SERPL CALC-SCNC: 8 MMOL/L (ref 10–20)
BACTERIA BLD CULT: ABNORMAL
BACTERIA UR CULT: NORMAL
BUN SERPL-MCNC: 12 MG/DL (ref 6–23)
CALCIUM SERPL-MCNC: 8 MG/DL (ref 8.6–10.3)
CHLORIDE SERPL-SCNC: 110 MMOL/L (ref 98–107)
CO2 SERPL-SCNC: 21 MMOL/L (ref 21–32)
CREAT SERPL-MCNC: 0.38 MG/DL (ref 0.5–1.05)
EGFRCR SERPLBLD CKD-EPI 2021: >90 ML/MIN/1.73M*2
ERYTHROCYTE [DISTWIDTH] IN BLOOD BY AUTOMATED COUNT: 16 % (ref 11.5–14.5)
GLUCOSE SERPL-MCNC: 86 MG/DL (ref 74–99)
GRAM STN SPEC: ABNORMAL
HCT VFR BLD AUTO: 24.5 % (ref 36–46)
HGB BLD-MCNC: 7.8 G/DL (ref 12–16)
MCH RBC QN AUTO: 32.5 PG (ref 26–34)
MCHC RBC AUTO-ENTMCNC: 31.8 G/DL (ref 32–36)
MCV RBC AUTO: 102 FL (ref 80–100)
NRBC BLD-RTO: 0 /100 WBCS (ref 0–0)
PLATELET # BLD AUTO: 451 X10*3/UL (ref 150–450)
POTASSIUM SERPL-SCNC: 3 MMOL/L (ref 3.5–5.3)
RBC # BLD AUTO: 2.4 X10*6/UL (ref 4–5.2)
SODIUM SERPL-SCNC: 136 MMOL/L (ref 136–145)
WBC # BLD AUTO: 13.3 X10*3/UL (ref 4.4–11.3)

## 2024-09-14 PROCEDURE — 94761 N-INVAS EAR/PLS OXIMETRY MLT: CPT

## 2024-09-14 PROCEDURE — 2500000004 HC RX 250 GENERAL PHARMACY W/ HCPCS (ALT 636 FOR OP/ED): Performed by: HOSPITALIST

## 2024-09-14 PROCEDURE — 82374 ASSAY BLOOD CARBON DIOXIDE: CPT | Performed by: HOSPITALIST

## 2024-09-14 PROCEDURE — 2500000004 HC RX 250 GENERAL PHARMACY W/ HCPCS (ALT 636 FOR OP/ED): Performed by: INTERNAL MEDICINE

## 2024-09-14 PROCEDURE — 2500000001 HC RX 250 WO HCPCS SELF ADMINISTERED DRUGS (ALT 637 FOR MEDICARE OP): Performed by: HOSPITALIST

## 2024-09-14 PROCEDURE — 2500000001 HC RX 250 WO HCPCS SELF ADMINISTERED DRUGS (ALT 637 FOR MEDICARE OP): Performed by: STUDENT IN AN ORGANIZED HEALTH CARE EDUCATION/TRAINING PROGRAM

## 2024-09-14 PROCEDURE — 2500000005 HC RX 250 GENERAL PHARMACY W/O HCPCS: Performed by: HOSPITALIST

## 2024-09-14 PROCEDURE — 99222 1ST HOSP IP/OBS MODERATE 55: CPT | Performed by: STUDENT IN AN ORGANIZED HEALTH CARE EDUCATION/TRAINING PROGRAM

## 2024-09-14 PROCEDURE — 1200000002 HC GENERAL ROOM WITH TELEMETRY DAILY

## 2024-09-14 PROCEDURE — 36415 COLL VENOUS BLD VENIPUNCTURE: CPT | Performed by: HOSPITALIST

## 2024-09-14 PROCEDURE — 85027 COMPLETE CBC AUTOMATED: CPT | Performed by: HOSPITALIST

## 2024-09-14 PROCEDURE — 99232 SBSQ HOSP IP/OBS MODERATE 35: CPT | Performed by: STUDENT IN AN ORGANIZED HEALTH CARE EDUCATION/TRAINING PROGRAM

## 2024-09-14 RX ORDER — POTASSIUM CHLORIDE 1.5 G/1.58G
20 POWDER, FOR SOLUTION ORAL
Status: COMPLETED | OUTPATIENT
Start: 2024-09-14 | End: 2024-09-14

## 2024-09-14 ASSESSMENT — PAIN - FUNCTIONAL ASSESSMENT
PAIN_FUNCTIONAL_ASSESSMENT: 0-10

## 2024-09-14 ASSESSMENT — ACTIVITIES OF DAILY LIVING (ADL)
TOILETING: NEEDS ASSISTANCE
ADEQUATE_TO_COMPLETE_ADL: YES
FEEDING YOURSELF: INDEPENDENT
GROOMING: NEEDS ASSISTANCE
DRESSING YOURSELF: NEEDS ASSISTANCE
JUDGMENT_ADEQUATE_SAFELY_COMPLETE_DAILY_ACTIVITIES: NO
ASSISTIVE_DEVICE: WALKER;DENTURES LOWER;DENTURES UPPER
BATHING: NEEDS ASSISTANCE
PATIENT'S MEMORY ADEQUATE TO SAFELY COMPLETE DAILY ACTIVITIES?: NO

## 2024-09-14 ASSESSMENT — COGNITIVE AND FUNCTIONAL STATUS - GENERAL
DAILY ACTIVITIY SCORE: 7
HELP NEEDED FOR BATHING: TOTAL
MOBILITY SCORE: 8
MOVING TO AND FROM BED TO CHAIR: TOTAL
EATING MEALS: A LOT
MOVING FROM LYING ON BACK TO SITTING ON SIDE OF FLAT BED WITH BEDRAILS: A LOT
TOILETING: TOTAL
DRESSING REGULAR LOWER BODY CLOTHING: TOTAL
PERSONAL GROOMING: TOTAL
WALKING IN HOSPITAL ROOM: TOTAL
CLIMB 3 TO 5 STEPS WITH RAILING: TOTAL
STANDING UP FROM CHAIR USING ARMS: TOTAL
TURNING FROM BACK TO SIDE WHILE IN FLAT BAD: A LOT
DRESSING REGULAR UPPER BODY CLOTHING: TOTAL

## 2024-09-14 ASSESSMENT — PAIN SCALES - GENERAL
PAINLEVEL_OUTOF10: 0 - NO PAIN

## 2024-09-14 NOTE — PROGRESS NOTES
Pt reviewed in care rounds this morning. Pt medically ready for discharge. Pt was waiting on a transfer to [facility] so Pt's ortho doctor could follow her there, however, Pt is now medically ready and this admission was not related to Pt's orthopedic needs. Ortho providers advised Pt can discharge back to SNF to continue rehab and follow up with Ortho outpatient. ABDIAS communicated with [facility] via [employer] and same submitted request for precert. ABDIAS spoke to Pt's /Brandon via phone and updated re: pending precert and possible discharge to [facility] tomorrow or Monday. Plan is for Pt to discharge to \[facility]\"" to continue rehab pending precert approval. SW to follow.

## 2024-09-14 NOTE — CARE PLAN
The patient's goals for the shift include      The clinical goals for the shift include Safety- fall precaution, hourly rounding    Problem: Nutrition  Goal: Oral intake greater than 50%  Outcome: Progressing  Goal: Adequate PO fluid intake  Outcome: Progressing  Goal: Lab values WNL  Outcome: Progressing  Goal: Electrolytes WNL  Outcome: Progressing  Goal: Promote healing  Outcome: Progressing     Problem: Pain - Adult  Goal: Verbalizes/displays adequate comfort level or baseline comfort level  Flowsheets (Taken 9/14/2024 0140)  Verbalizes/displays adequate comfort level or baseline comfort level: Assess pain using appropriate pain scale     Problem: Safety - Adult  Goal: Free from fall injury  Flowsheets (Taken 9/14/2024 0140)  Free from fall injury: Based on caregiver fall risk screen, instruct family/caregiver to ask for assistance with transferring infant if caregiver noted to have fall risk factors     Problem: Skin  Goal: Decreased wound size/increased tissue granulation at next dressing change  Flowsheets (Taken 9/14/2024 0140)  Decreased wound size/increased tissue granulation at next dressing change: Promote sleep for wound healing

## 2024-09-14 NOTE — CARE PLAN
The patient's goals for the shift include      The clinical goals for the shift include Safety- fall precaution, hourly rounding

## 2024-09-14 NOTE — SIGNIFICANT EVENT
Micro lab called me informing me that one of the 4 blood cultures is growing gram-negative bacilli.  Patient is currently on ceftriaxone.  Should patient spike any fever, then we will go ahead and broaden the antibiotic coverage

## 2024-09-14 NOTE — PROGRESS NOTES
Subjective Data:  Patient reports feeling well, no new adverse events overnight. Patient denies any chest pain, shortness of breath, palpitations, dizziness or syncope. Patient is hemodynamically stable.     Overnight Events:    No     Objective Data:  Last Recorded Vitals:  Vitals:    09/14/24 0650 09/14/24 0736 09/14/24 1145 09/14/24 1352   BP:  113/56     BP Location:  Right arm     Patient Position:  Lying     Pulse:  86     Resp:  20     Temp:  36.1 °C (97 °F)     TempSrc:  Temporal     SpO2: 93% 95% 98% 93%   Weight:       Height:           Last Labs:  CBC - 9/14/2024:  4:49 AM  13.3 7.8 451    24.5      CMP - 9/14/2024:  4:49 AM  8.0 4.7 19 --- 1.4   3.3 2.7 7 186      PTT - 9/12/2024:  9:48 AM  1.0   11.9 23     TROPHS   Date/Time Value Ref Range Status   09/12/2024 11:00 AM 17 0 - 13 ng/L Final   09/12/2024 09:48 AM 14 0 - 13 ng/L Final   09/07/2024 05:35 PM 22 0 - 13 ng/L Final     BNP   Date/Time Value Ref Range Status   09/07/2024 04:05  0 - 99 pg/mL Final      Last I/O:  I/O last 3 completed shifts:  In: 8433.3 (176.7 mL/kg) [P.O.:4530; I.V.:3853.3 (80.8 mL/kg); IV Piggyback:50]  Out: - (0 mL/kg)   Weight: 47.7 kg     Past Cardiology Tests (Last 3 Years):  EKG:  ECG 12 lead 09/12/2024 (Preliminary)      ECG 12 lead 09/07/2024    Echo:  Transthoracic Echo (TTE) Complete 09/13/2024    Ejection Fractions:  EF   Date/Time Value Ref Range Status   09/13/2024 05:47 AM 68 %      Cath:  No results found for this or any previous visit from the past 1095 days.    Stress Test:  No results found for this or any previous visit from the past 1095 days.    Cardiac Imaging:  No results found for this or any previous visit from the past 1095 days.      Inpatient Medications:  Scheduled medications   Medication Dose Route Frequency    apixaban  10 mg oral BID    Followed by    [START ON 9/20/2024] apixaban  5 mg oral BID    ascorbic acid  500 mg oral Daily    cefTRIAXone  1 g intravenous q24h    ferrous sulfate  (325 mg ferrous sulfate)  65 mg of iron oral Daily    memantine  10 mg oral BID     PRN medications   Medication    ketoconazole    oxygen     Continuous Medications   Medication Dose Last Rate    sodium chloride 0.9%  100 mL/hr 100 mL/hr (09/14/24 1200)       Physical Exam:  General: alert and in no acute distress  HEENT: NC/AT; EOMI; PERRLA, external ear is normal  Neck: supple; trachea midline; no masses; no JVD  Chest: clear breath sounds bilaterally; no wheezing  Cardio: regular rhythm, S1S2 normal, no murmurs  Abdomen: Soft, non-tender, non-distension, no organomegaly  Extremities: Slightly edema LE b/l     Assessment/Plan     Mrs. Dinah Parmar is a 83 y.o. female being consulted by the Cardiology team for syncope. Patient with prior medical history significant for dementia, osteoporosis, urge incontinence, recent R hip fracture (9/1/2024). Patient with multiple recent admissions due to multiple falls including R hip fracture. She was brought from SNF to ED [facility] on 09/12/2024 after an unwitnessed fall and unresponsiveness. Patient is a poor historian and cannot give reliable information at this point. History has been obtained from previous records. Per chart review,  she was found to have a right intertrochanteric hip fracture for which she underwent surgical fixation on 9/2/2024. Patient was found to have a T9 compression fracture with no involvement of the posterior structures, no epidural hematoma and no severe spinal canal stenosis. She was asymptomatic from that regard. The fracture was biomechanically stable. No neuro- surgical intervention is indicated. She was brought by squad with unresponsive with low blood pressure. The patient's last well-known was at 8:30 AM on 09/12/2024. Patient was treated with push dose epinephrine by EMS and patient had mild improvement of her blood pressure and the patient was able to communicate at the arrival to the ED she was complaining of abdominal pain symptoms.  Patient had a code stroke that was noted at 9:27 AM when the patient arrived to the trauma bay. After initial assessment of the patient she was taken mainly to the CT scan for potential stroke workup she was ordered a CT scan of the brain in addition to his CT scan imaging of the abdomen pelvis. Patient returned to the room 1 hour later with no complains. EKG shows normal sinus rhythm with PACs and no signs of acute ischemic changes. Trop 14-17. Patient has been admitted for clinical compensation.     Assessment      # Syncope / Acute DVT  - She was brought by squad with unresponsive with low blood pressure.  - Patient was treated with push dose epinephrine by EMS and patient had mild improvement of her blood pressure and the patient was able to communicate.   - Trop 14-17  - EKG shows normal sinus rhythm with PACs and no signs of acute ischemic changes.  - Echocardiogram (09/13/2024):   1. The left ventricular systolic function is normal, with a visually estimated ejection fraction of 65-70%.   2. Spectral Doppler shows an impaired relaxation pattern of left ventricular diastolic filling.   3. There is normal right ventricular global systolic function.   4. Mild to moderate aortic valve regurgitation.  - US carotids:  Right Carotid: Findings are consistent with less than 50% stenosis of the right proximal internal carotid artery. Right external carotid artery appears patent with no evidence of stenosis. The right vertebral artery is patent with antegrade flow. No evidence of hemodynamically significant stenosis in the right subclavian artery.  Left Carotid: Findings are consistent with less than 50% stenosis of the left proximal internal carotid artery. Left external carotid artery appears patent with no evidence of stenosis. The left vertebral artery is patent with antegrade flow. No evidence of hemodynamically significant stenosis in the left subclavian artery.  - US venous LE:  Critical Result: Positive for  acute DVT of right peroneal vein and ?age of the distal right FV.  CONCLUSIONS:  Right Lower Venous: There is acute occlusive deep vein thrombosis visualized in the peroneal vein. There is age indeterminate deep vein thrombosis visualized in the distal femoral vein. Technically difficult due to the patients position.  Left Lower Venous: Negative for acute DVT of the visualized vessel. The calf veins are poorly visualized due to the patients position.  - Would suggest to continue current medical management per primary team.    - Would suggest to keep DOAC - Apixaban.  - Would suggest to keep Metoprolol succinate 25mg daily.  - Would suggest 7-day holter monitor upon discharge and follow up with Cardiology team.     Thank you for allowing me to participate in the care of this patient. Please reach me out if you have any questions or if you need any clarifications regarding the patient's care.     Peripheral IV 09/14/24 20 G Left;Anterior Forearm (Active)   Site Assessment Clean;Dry;Intact 09/14/24 1300   Dressing Type Transparent 09/14/24 1300   Line Status Infusing 09/14/24 1300   Dressing Status Clean;Dry;Occlusive 09/14/24 1300   Number of days: 0       Code Status:  Full Code    Ten Rios MD  Cardiology

## 2024-09-14 NOTE — NURSING NOTE
Received an update from Dougie, RN/Supervisor, spoke with [facility] on 9/13/24 around 2100 and updated transfer center; informed no bed available at this time.

## 2024-09-15 VITALS
HEIGHT: 63 IN | RESPIRATION RATE: 20 BRPM | TEMPERATURE: 97.3 F | DIASTOLIC BLOOD PRESSURE: 60 MMHG | OXYGEN SATURATION: 97 % | WEIGHT: 105.2 LBS | HEART RATE: 82 BPM | BODY MASS INDEX: 18.64 KG/M2 | SYSTOLIC BLOOD PRESSURE: 140 MMHG

## 2024-09-15 LAB
ANION GAP SERPL CALC-SCNC: 11 MMOL/L (ref 10–20)
ATRIAL RATE: 88 BPM
BACTERIA BLD CULT: NORMAL
BACTERIA BLD CULT: NORMAL
BUN SERPL-MCNC: 4 MG/DL (ref 6–23)
CALCIUM SERPL-MCNC: 7.9 MG/DL (ref 8.6–10.3)
CHLORIDE SERPL-SCNC: 106 MMOL/L (ref 98–107)
CO2 SERPL-SCNC: 20 MMOL/L (ref 21–32)
CREAT SERPL-MCNC: 0.39 MG/DL (ref 0.5–1.05)
EGFRCR SERPLBLD CKD-EPI 2021: >90 ML/MIN/1.73M*2
ERYTHROCYTE [DISTWIDTH] IN BLOOD BY AUTOMATED COUNT: 15.5 % (ref 11.5–14.5)
GLUCOSE SERPL-MCNC: 93 MG/DL (ref 74–99)
HCT VFR BLD AUTO: 26 % (ref 36–46)
HGB BLD-MCNC: 7.9 G/DL (ref 12–16)
MCH RBC QN AUTO: 32.2 PG (ref 26–34)
MCHC RBC AUTO-ENTMCNC: 30.4 G/DL (ref 32–36)
MCV RBC AUTO: 106 FL (ref 80–100)
NRBC BLD-RTO: 0 /100 WBCS (ref 0–0)
P AXIS: 75 DEGREES
P OFFSET: 177 MS
P ONSET: 132 MS
PLATELET # BLD AUTO: 393 X10*3/UL (ref 150–450)
POTASSIUM SERPL-SCNC: 2.8 MMOL/L (ref 3.5–5.3)
PR INTERVAL: 158 MS
Q ONSET: 211 MS
QRS COUNT: 14 BEATS
QRS DURATION: 76 MS
QT INTERVAL: 392 MS
QTC CALCULATION(BAZETT): 474 MS
QTC FREDERICIA: 445 MS
R AXIS: -37 DEGREES
RBC # BLD AUTO: 2.45 X10*6/UL (ref 4–5.2)
SODIUM SERPL-SCNC: 134 MMOL/L (ref 136–145)
T AXIS: 73 DEGREES
T OFFSET: 407 MS
VENTRICULAR RATE: 88 BPM
WBC # BLD AUTO: 10.1 X10*3/UL (ref 4.4–11.3)

## 2024-09-15 PROCEDURE — 97530 THERAPEUTIC ACTIVITIES: CPT | Mod: GP,CQ

## 2024-09-15 PROCEDURE — 99232 SBSQ HOSP IP/OBS MODERATE 35: CPT | Performed by: STUDENT IN AN ORGANIZED HEALTH CARE EDUCATION/TRAINING PROGRAM

## 2024-09-15 PROCEDURE — 2500000001 HC RX 250 WO HCPCS SELF ADMINISTERED DRUGS (ALT 637 FOR MEDICARE OP): Performed by: INTERNAL MEDICINE

## 2024-09-15 PROCEDURE — 85027 COMPLETE CBC AUTOMATED: CPT | Performed by: STUDENT IN AN ORGANIZED HEALTH CARE EDUCATION/TRAINING PROGRAM

## 2024-09-15 PROCEDURE — 99222 1ST HOSP IP/OBS MODERATE 55: CPT | Performed by: STUDENT IN AN ORGANIZED HEALTH CARE EDUCATION/TRAINING PROGRAM

## 2024-09-15 PROCEDURE — 36415 COLL VENOUS BLD VENIPUNCTURE: CPT | Performed by: STUDENT IN AN ORGANIZED HEALTH CARE EDUCATION/TRAINING PROGRAM

## 2024-09-15 PROCEDURE — 2500000005 HC RX 250 GENERAL PHARMACY W/O HCPCS: Performed by: HOSPITALIST

## 2024-09-15 PROCEDURE — 94761 N-INVAS EAR/PLS OXIMETRY MLT: CPT

## 2024-09-15 PROCEDURE — 2500000001 HC RX 250 WO HCPCS SELF ADMINISTERED DRUGS (ALT 637 FOR MEDICARE OP): Performed by: HOSPITALIST

## 2024-09-15 PROCEDURE — 87040 BLOOD CULTURE FOR BACTERIA: CPT | Mod: SAMLAB | Performed by: STUDENT IN AN ORGANIZED HEALTH CARE EDUCATION/TRAINING PROGRAM

## 2024-09-15 PROCEDURE — 2500000004 HC RX 250 GENERAL PHARMACY W/ HCPCS (ALT 636 FOR OP/ED): Performed by: INTERNAL MEDICINE

## 2024-09-15 PROCEDURE — 2500000004 HC RX 250 GENERAL PHARMACY W/ HCPCS (ALT 636 FOR OP/ED): Performed by: HOSPITALIST

## 2024-09-15 PROCEDURE — 97110 THERAPEUTIC EXERCISES: CPT | Mod: GP,CQ

## 2024-09-15 PROCEDURE — 80048 BASIC METABOLIC PNL TOTAL CA: CPT | Performed by: STUDENT IN AN ORGANIZED HEALTH CARE EDUCATION/TRAINING PROGRAM

## 2024-09-15 PROCEDURE — 1200000002 HC GENERAL ROOM WITH TELEMETRY DAILY

## 2024-09-15 PROCEDURE — 87506 IADNA-DNA/RNA PROBE TQ 6-11: CPT | Mod: SAMLAB | Performed by: HOSPITALIST

## 2024-09-15 PROCEDURE — 2500000004 HC RX 250 GENERAL PHARMACY W/ HCPCS (ALT 636 FOR OP/ED): Performed by: STUDENT IN AN ORGANIZED HEALTH CARE EDUCATION/TRAINING PROGRAM

## 2024-09-15 RX ORDER — TRAZODONE HYDROCHLORIDE 50 MG/1
25 TABLET ORAL NIGHTLY PRN
Status: DISCONTINUED | OUTPATIENT
Start: 2024-09-15 | End: 2024-09-18 | Stop reason: HOSPADM

## 2024-09-15 RX ORDER — ACETAMINOPHEN 325 MG/1
650 TABLET ORAL EVERY 6 HOURS PRN
Status: DISCONTINUED | OUTPATIENT
Start: 2024-09-15 | End: 2024-09-18 | Stop reason: HOSPADM

## 2024-09-15 RX ORDER — POTASSIUM CHLORIDE 14.9 MG/ML
20 INJECTION INTRAVENOUS
Status: COMPLETED | OUTPATIENT
Start: 2024-09-15 | End: 2024-09-15

## 2024-09-15 ASSESSMENT — COGNITIVE AND FUNCTIONAL STATUS - GENERAL
DRESSING REGULAR LOWER BODY CLOTHING: TOTAL
PERSONAL GROOMING: TOTAL
PERSONAL GROOMING: TOTAL
TOILETING: TOTAL
CLIMB 3 TO 5 STEPS WITH RAILING: TOTAL
MOVING TO AND FROM BED TO CHAIR: TOTAL
MOBILITY SCORE: 10
STANDING UP FROM CHAIR USING ARMS: A LOT
HELP NEEDED FOR BATHING: TOTAL
DAILY ACTIVITIY SCORE: 7
TURNING FROM BACK TO SIDE WHILE IN FLAT BAD: A LOT
EATING MEALS: A LOT
MOVING FROM LYING ON BACK TO SITTING ON SIDE OF FLAT BED WITH BEDRAILS: A LOT
WALKING IN HOSPITAL ROOM: TOTAL
TOILETING: TOTAL
TURNING FROM BACK TO SIDE WHILE IN FLAT BAD: A LOT
MOVING TO AND FROM BED TO CHAIR: A LOT
DRESSING REGULAR UPPER BODY CLOTHING: TOTAL
MOVING FROM LYING ON BACK TO SITTING ON SIDE OF FLAT BED WITH BEDRAILS: A LOT
CLIMB 3 TO 5 STEPS WITH RAILING: TOTAL
CLIMB 3 TO 5 STEPS WITH RAILING: TOTAL
MOVING TO AND FROM BED TO CHAIR: TOTAL
HELP NEEDED FOR BATHING: TOTAL
DRESSING REGULAR LOWER BODY CLOTHING: TOTAL
MOBILITY SCORE: 8
STANDING UP FROM CHAIR USING ARMS: TOTAL
DAILY ACTIVITIY SCORE: 7
DRESSING REGULAR UPPER BODY CLOTHING: TOTAL
MOBILITY SCORE: 8
STANDING UP FROM CHAIR USING ARMS: TOTAL
EATING MEALS: A LOT
MOVING FROM LYING ON BACK TO SITTING ON SIDE OF FLAT BED WITH BEDRAILS: A LOT
TURNING FROM BACK TO SIDE WHILE IN FLAT BAD: A LOT

## 2024-09-15 ASSESSMENT — PAIN DESCRIPTION - LOCATION: LOCATION: HIP

## 2024-09-15 ASSESSMENT — PAIN - FUNCTIONAL ASSESSMENT
PAIN_FUNCTIONAL_ASSESSMENT: 0-10
PAIN_FUNCTIONAL_ASSESSMENT: 0-10

## 2024-09-15 ASSESSMENT — PAIN SCALES - WONG BAKER: WONGBAKER_NUMERICALRESPONSE: HURTS LITTLE BIT

## 2024-09-15 ASSESSMENT — PAIN SCALES - GENERAL
PAINLEVEL_OUTOF10: 0 - NO PAIN
PAINLEVEL_OUTOF10: 0 - NO PAIN

## 2024-09-15 ASSESSMENT — PAIN DESCRIPTION - ORIENTATION: ORIENTATION: RIGHT

## 2024-09-15 NOTE — PROGRESS NOTES
Adrian Parmar is a 83 y.o. female on day 3 of admission presenting with Syncope, unspecified syncope type.      Subjective   Patient seen and examined bedside.  She is more awake today.  She was eating her pudding.  Patient does not look like having any swallow problems.  We can initiate diet today     Objective         Expand All Collapse All    Adrian Parmar is a 83 y.o. female on day 3 of admission presenting with Syncope, unspecified syncope type.           Subjective     Patient seen and examined bedside.  She seems very confused.          Objective[]Expand by Default  Last Recorded Vitals  /58 (BP Location: Left arm, Patient Position: Lying)   Pulse 69   Temp 36.2 °C (97.2 °F) (Temporal)   Resp 20   Wt 47.7 kg (105 lb 3.2 oz)   SpO2 97%   Intake/Output last 3 Shifts:     Intake/Output Summary (Last 24 hours) at 9/15/2024 0837  Last data filed at 9/15/2024 0200      Gross per 24 hour   Intake 670 ml   Output 1 ml   Net 669 ml         Admission Weight  Weight: 48.5 kg (106 lb 14.8 oz) (09/12/24 0944)     Daily Weight  09/12/24 : 47.7 kg (105 lb 3.2 oz)     Image Results  Vascular US carotid artery duplex [place] [address]  Phone [phone], Fax [phone]        Vascular Lab Report     [facility] US CAROTID ARTERY DUPLEX BILATERAL     Patient Name:      ADRIAN PARMAR            Reading Physician:  [id number] Adal Roach MD, RPVI  Study Date:        9/13/2024             Ordering Provider:  [id number] LORI BACK  MRN/PID:           [id number]              Fellow:  Accession#:        [id number]          Technologist:       Audra Dawson RVT,                                                               RDMS  Date of Birth/Age: 1940 / 83 years Technologist 2:  Gender:            F                     Encounter#:         [id number]  Admission Status:  Inpatient              Location Performed: [facility]        Diagnosis/ICD: Other specified symptoms and signs involving the circulatory and                 respiratory systems-R09.89  CPT Codes:     11690 Cerebrovascular Carotid Duplex scan complete        CONCLUSIONS:  Right Carotid: Findings are consistent with less than 50% stenosis of the right proximal internal carotid artery. Right external carotid artery appears patent with no evidence of stenosis. The right vertebral artery is patent with antegrade flow. No evidence of hemodynamically significant stenosis in the right subclavian artery.  Left Carotid: Findings are consistent with less than 50% stenosis of the left proximal internal carotid artery. Left external carotid artery appears patent with no evidence of stenosis. The left vertebral artery is patent with antegrade flow. No evidence of hemodynamically significant stenosis in the left subclavian artery.     Imaging & Doppler Findings:  Right Plaque Morph: The proximal right internal carotid artery demonstrates heterogenous plaque.  Left Plaque Morph: The proximal left internal carotid artery demonstrates heterogenous plaque.      Right                        Left    PSV      EDV                PSV      EDV  114 cm/s 98 cm/s   CCA P    109 cm/s 4 cm/s  89 cm/s  10 cm/s   CCA D    102 cm/s 8 cm/s  111 cm/s 9 cm/s    ICA P    69 cm/s  13 cm/s  81 cm/s  8 cm/s    ICA M    104 cm/s 16 cm/s  109 cm/s 13 cm/s   ICA D    118 cm/s 24 cm/s  132 cm/s            ECA     118 cm/s  149 cm/s         Vertebral  92 cm/s  6 cm/s  156 cm/s         Subclavian 182 cm/s                      Right Left  ICA/CCA Ratio  1.2  0.7           [id number] Adal Roach MD, BOBYVI  Electronically signed by [id number] Adal Roach MD, RPMISA on 9/13/2024 at 9:50:10 PM        ** Final **  Lower extremity venous duplex bilateral               [address],  [place] [id number]  Phone [phone] [phone], Fax [phone]        Vascular Lab Report     VASC US LOWER EXTREMITY VENOUS DUPLEX BILATERAL     Patient Name:      ADRIAN AVENDAÑO PERCY Hernandes Physician:  [id number] Adal Roach MD, RPVI  Study Date:        9/13/2024             Ordering Provider:  [id number] MARTELL MONTEZ  MRN/PID:           [id number]              Fellow:  Accession#:        [id number]          Technologist:       Audra Dawson RVT,                                                               [facility]  Date of Birth/Age: 1940 / 83 years Technologist 2:  Gender:            F                     Encounter#:         [id number]  Admission Status:  Inpatient             Location Performed: [facility]        Diagnosis/ICD: Localized (leg) edema-R60.0  CPT Codes:     20520 Peripheral venous duplex scan for DVT complete        **CRITICAL RESULT**  Critical Result: Positive for acute DVT of right peroneal vein and ?age of the distal right FV.  Notification called to Dr Montez on 9/13/2024 at 4:36:57 PM by Audra Dawson RVT.     CONCLUSIONS:  Right Lower Venous: There is acute occlusive deep vein thrombosis visualized in the peroneal vein. There is age indeterminate deep vein thrombosis visualized in the distal femoral vein. Technically difficult due to the patients position.  Left Lower Venous: Negative for acute DVT of the visualized vessel. The calf veins are poorly visualized due to the patients position.     Imaging & Doppler Findings:     Right                 Compressible    Thrombus            Flow  Distal External Iliac                   None  CFV                       Yes           None       Spontaneous/Phasic  PFV                       Yes           None  FV Proximal               Yes           None       Spontaneous/Phasic  FV Mid                    Yes            None  FV Distal               Partial         ? Age  Popliteal                 Yes           None       Spontaneous/Phasic  Peroneal                   No      Acute occlusive  PTV                       Yes           None        Left                  Compress Thrombus        Flow  Distal External Iliac            None  CFV                     Yes      None   Spontaneous/Phasic  PFV                     Yes      None  FV Proximal             Yes      None   Spontaneous/Phasic  FV Mid                  Yes      None  FV Distal               Yes      None  Popliteal               Yes      None   Spontaneous/Phasic        [id number] Adal Roach MD, RPVI  Electronically signed by [id number] Adal Roach MD, RPVI on 9/13/2024 at 9:46:19 PM        ** Final **  ECG 12 lead  Normal sinus rhythm  Cannot rule out Anterior infarct , age undetermined  Abnormal ECG  No previous ECGs available  See ED provider note for full interpretation and clinical correlation  Confirmed by Trisha Whittington ([id number]) on 9/13/2024 9:31:31 PM  Transthoracic Echo (TTE) [place] [address]  Phone [phone] [phone], Fax [phone]     TRANSTHORACIC ECHOCARDIOGRAM REPORT     Patient Name:      ADRIAN Hernandes Physician:    [id number] Eduin García MD  Study Date:        9/13/2024             Ordering Provider:    [id number] YASH SCHAEFFER  MRN/PID:           [id number]              Fellow:  Accession#:        [id number]          Nurse:  Date of Birth/Age: 1940 / 83 years Sonographer:          Drake Noe RDCS  Gender:            F                     Additional Staff:  Height:            157.48 cm             Admit Date:  Weight:            47.63 kg               Admission Status:     Inpatient -                                                                 Routine  BSA / BMI:         1.45 m2 / 19.20 kg/m2 Department Location:  [address]  Blood Pressure: 114 /65 mmHg     Study Type:    TRANSTHORACIC ECHO (TTE) COMPLETE  Diagnosis/ICD: Unspecified systolic (congestive) heart failure (CHF)-I50.20;                 Syncope-R55  CPT Codes:     Echo Complete w Full Doppler-61702   Study Detail: The following Echo studies were performed: 2D, M-Mode, Doppler and                color flow.        PHYSICIAN INTERPRETATION:  Left Ventricle: The left ventricular systolic function is normal, with a visually estimated ejection fraction of 65-70%. There are no regional wall motion abnormalities. The left ventricular cavity size is normal. Spectral Doppler shows an impaired relaxation pattern of left ventricular diastolic filling.  Left Atrium: The left atrium is normal in size.  Right Ventricle: The right ventricle is normal in size. There is normal right ventricular global systolic function.  Right Atrium: The right atrium is normal in size.  Aortic Valve: The aortic valve is trileaflet. The aortic valve dimensionless index is 1.05. There is mild to moderate aortic valve regurgitation. The peak instantaneous gradient of the aortic valve is 14.4 mmHg. The mean gradient of the aortic valve is 7.0 mmHg.  Mitral Valve: The mitral valve is normal in structure. There is no evidence of mitral valve regurgitation.  Tricuspid Valve: The tricuspid valve is structurally normal. No evidence of tricuspid regurgitation.  Pulmonic Valve: The pulmonic valve is not well visualized. There is no indication of pulmonic valve regurgitation.  Pericardium: There is a trivial pericardial effusion.  Aorta: The aortic root is normal.  Systemic Veins: The inferior vena cava appears to be of normal size, IVC inspiratory collapse greater than 50%.        CONCLUSIONS:   1. The left ventricular systolic function is  normal, with a visually estimated ejection fraction of 65-70%.   2. Spectral Doppler shows an impaired relaxation pattern of left ventricular diastolic filling.   3. There is normal right ventricular global systolic function.   4. Mild to moderate aortic valve regurgitation.     QUANTITATIVE DATA SUMMARY:     2D MEASUREMENTS:           Normal Ranges:  Ao Root d:       3.10 cm   (2.0-3.7cm)  LAs:             3.00 cm   (2.7-4.0cm)  IVSd:            0.87 cm   (0.6-1.1cm)  LVPWd:           0.76 cm   (0.6-1.1cm)  LVIDd:           3.92 cm   (3.9-5.9cm)  LVIDs:           2.31 cm  LV Mass Index:   63.9 g/m2  LV % FS          41.1 %        LA VOLUME:                   Normal Ranges:  LA Vol A4C:        13.2 ml   (22+/-6mL/m2)  LA Vol A2C:        13.6 ml  LA Vol BP:         13.5 ml  LA Vol Index A4C:  9.1ml/m2  LA Vol Index A2C:  9.3 ml/m2  LA Vol Index BP:   9.3 ml/m2  LA Area A4C:       7.8 cm2  LA Area A2C:       7.8 cm2  LA Major Axis A4C: 4.0 cm  LA Major Axis A2C: 3.9 cm  LA Volume Index:   9.0 ml/m2  LA Vol A4C:        13.1 ml  LA Vol A2C:        13.5 ml  LA Vol Index BSA:  9.1 ml/m2        AORTA MEASUREMENTS:         Normal Ranges:  Asc Ao, d:          3.40 cm (2.1-3.4cm)        LV SYSTOLIC FUNCTION BY 2D PLANIMETRY (MOD):                       Normal Ranges:  EF-A4C View:    53 % (>=55%)  EF-A2C View:    48 %  EF-Biplane:     50 %  EF-Visual:      68 %  LV EF Reported: 68 %        LV DIASTOLIC FUNCTION:           Normal Ranges:  MV e'                  0.068 m/s (>8.0)  MV lateral e'          0.08 m/s  MV medial e'           0.06 m/s        AORTIC VALVE:                      Normal Ranges:  AoV Vmax:                1.90 m/s  (<=1.7m/s)  AoV Peak P.4 mmHg (<20mmHg)  AoV Mean P.0 mmHg  (1.7-11.5mmHg)  LVOT Max Duarte:            1.93 m/s  (<=1.1m/s)  AoV VTI:                 33.30 cm  (18-25cm)  LVOT VTI:                35.00 cm  LVOT Diameter:           1.70 cm   (1.8-2.4cm)  AoV Area,  VTI:           2.39 cm2  (2.5-5.5cm2)  AoV Area,Vmax:           2.31 cm2  (2.5-4.5cm2)  AoV Dimensionless Index: 1.05        RIGHT VENTRICLE:  RV Basal 3.30 cm  RV Mid   2.50 cm  RV Major 6.6 cm  RV s'    0.22 m/s        [id number] Eduin García MD  Electronically signed on 9/13/2024 at 11:30:20 AM        ** Final **        Physical Exam  Constitutional:       Comments: awake alert oreiented   HENT:      Head: Normocephalic and atraumatic.      Nose: Nose normal.      Mouth/Throat:      Mouth: Mucous membranes are moist.   Eyes:      Pupils: Pupils are equal, round, and reactive to light.   Cardiovascular:      Rate and Rhythm: Normal rate and regular rhythm.      Pulses: Normal pulses.   Pulmonary:      Effort: Pulmonary effort is normal.   Abdominal:      Palpations: Abdomen is soft.   Musculoskeletal:      Cervical back: Normal range of motion.   Skin:     General: Skin is dry.   Neurological:      General: No focal deficit present.      Comments: Advanced dementia            Last Recorded Vitals  /58 (BP Location: Left arm, Patient Position: Lying)   Pulse 69   Temp 36.2 °C (97.2 °F) (Temporal)   Resp 20   Wt 47.7 kg (105 lb 3.2 oz)   SpO2 97%   Intake/Output last 3 Shifts:    Intake/Output Summary (Last 24 hours) at 9/15/2024 1103  Last data filed at 9/15/2024 0200  Gross per 24 hour   Intake 250 ml   Output 1 ml   Net 249 ml       Admission Weight  Weight: 48.5 kg (106 lb 14.8 oz) (09/12/24 0944)    Daily Weight  09/12/24 : 47.7 kg (105 lb 3.2 oz)    Image Results  Vascular US carotid artery [place] [address]  Phone [phone] ext-[phone], Fax [phone]       Vascular Lab Report     VASC US CAROTID ARTERY DUPLEX BILATERAL    Patient Name:      ADRIAN AVENDAÑO PERCY Hernandes Physician:  [id number] Adal Roach MD, RPVI  Study Date:        9/13/2024              Ordering Provider:  [id number] LORI BACK  MRN/PID:           [id number]              Fellow:  Accession#:        [id number]          Technologist:       Audra Dawson RVT, RDMS  Date of Birth/Age: 1940 / 83 years Technologist 2:  Gender:            F                     Encounter#:         [id number]  Admission Status:  Inpatient             Location Performed: [facility]       Diagnosis/ICD: Other specified symptoms and signs involving the circulatory and                 respiratory systems-R09.89  CPT Codes:     78733 Cerebrovascular Carotid Duplex scan complete       CONCLUSIONS:  Right Carotid: Findings are consistent with less than 50% stenosis of the right proximal internal carotid artery. Right external carotid artery appears patent with no evidence of stenosis. The right vertebral artery is patent with antegrade flow. No evidence of hemodynamically significant stenosis in the right subclavian artery.  Left Carotid: Findings are consistent with less than 50% stenosis of the left proximal internal carotid artery. Left external carotid artery appears patent with no evidence of stenosis. The left vertebral artery is patent with antegrade flow. No evidence of hemodynamically significant stenosis in the left subclavian artery.     Imaging & Doppler Findings:  Right Plaque Morph: The proximal right internal carotid artery demonstrates heterogenous plaque.  Left Plaque Morph: The proximal left internal carotid artery demonstrates heterogenous plaque.      Right                        Left    PSV      EDV                PSV      EDV  114 cm/s 98 cm/s   CCA P    109 cm/s 4 cm/s  89 cm/s  10 cm/s   CCA D    102 cm/s 8 cm/s  111 cm/s 9 cm/s    ICA P    69 cm/s  13 cm/s  81 cm/s  8 cm/s    ICA M    104 cm/s 16 cm/s  109 cm/s 13 cm/s   ICA D    118 cm/s 24 cm/s  132 cm/s             ECA     118 cm/s  149 cm/s         Vertebral  92 cm/s  6 cm/s  156 cm/s         Subclavian 182 cm/s                     Right Left  ICA/CCA Ratio  1.2  0.7          [id number] Adal Roach MD, ESA  Electronically signed by [id number] Roach MD, ESA on 9/13/2024 at 9:50:10 PM       ** Final **  Lower extremity venous duplex [place] [address]  Phone [phone] ext-[phone], Fax [phone]       Vascular Lab Report     VASC US LOWER EXTREMITY VENOUS DUPLEX BILATERAL    Patient Name:      ADRIAN GREER            Cecilio Physician:  [id number] Roach MD, ESA  Study Date:        9/13/2024             Ordering Provider:  [id number] MARTELL WHITMAN  MRN/PID:           [id number]              Fellow:  Accession#:        [id number]          Technologist:       Audra Dawson RVT,                                                               [facility]  Date of Birth/Age: 1940 / 83 years Technologist 2:  Gender:            F                     Encounter#:         [id number]  Admission Status:  Inpatient             Location Performed: [facility]       Diagnosis/ICD: Localized (leg) edema-R60.0  CPT Codes:     28029 Peripheral venous duplex scan for DVT complete       **CRITICAL RESULT**  Critical Result: Positive for acute DVT of right peroneal vein and ?age of the distal right FV.  Notification called to Dr Whitman on 9/13/2024 at 4:36:57 PM by Audra Dawson T.     CONCLUSIONS:  Right Lower Venous: There is acute occlusive deep vein thrombosis visualized in the peroneal vein. There is age indeterminate deep vein thrombosis visualized in the distal femoral vein. Technically difficult due to the patients position.  Left Lower Venous: Negative for acute DVT of the visualized vessel. The calf veins are poorly visualized due to  the patients position.     Imaging & Doppler Findings:     Right                 Compressible    Thrombus            Flow  Distal External Iliac                   None  CFV                       Yes           None       Spontaneous/Phasic  PFV                       Yes           None  FV Proximal               Yes           None       Spontaneous/Phasic  FV Mid                    Yes           None  FV Distal               Partial         ? Age  Popliteal                 Yes           None       Spontaneous/Phasic  Peroneal                   No      Acute occlusive  PTV                       Yes           None       Left                  Compress Thrombus        Flow  Distal External Iliac            None  CFV                     Yes      None   Spontaneous/Phasic  PFV                     Yes      None  FV Proximal             Yes      None   Spontaneous/Phasic  FV Mid                  Yes      None  FV Distal               Yes      None  Popliteal               Yes      None   Spontaneous/Phasic       [id number] Adal Roach MD, RPVI  Electronically signed by [id number] Adal Roach MD, RPVI on 9/13/2024 at 9:46:19 PM       ** Final **  ECG 12 lead  Normal sinus rhythm  Cannot rule out Anterior infarct , age undetermined  Abnormal ECG  No previous ECGs available  See ED provider note for full interpretation and clinical correlation  Confirmed by Trisha Whittington ([id number]) on 9/13/2024 9:31:31 PM  Transthoracic Echo (TTE) [place] [address]  Phone [phone] ext-[phone], Fax [phone]    TRANSTHORACIC ECHOCARDIOGRAM REPORT    Patient Name:      ADRIAN Hernandes Physician:    [id number] Eduin García MD  Study Date:        9/13/2024             Ordering Provider:    [id number] YASH VELAZQUEZ                                                                  LAURY  MRN/PID:           [id number]              Fellow:  Accession#:        [id number]          Nurse:  Date of Birth/Age: 1940 / 83 years Sonographer:          Drake Noe RDCS  Gender:            F                     Additional Staff:  Height:            157.48 cm             Admit Date:  Weight:            47.63 kg              Admission Status:     Inpatient -                                                                 Routine  BSA / BMI:         1.45 m2 / 19.20 kg/m2 Department Location:  [address]  Blood Pressure: 114 /65 mmHg    Study Type:    TRANSTHORACIC ECHO (TTE) COMPLETE  Diagnosis/ICD: Unspecified systolic (congestive) heart failure (CHF)-I50.20;                 Syncope-R55  CPT Codes:     Echo Complete w Full Doppler-22203   Study Detail: The following Echo studies were performed: 2D, M-Mode, Doppler and                color flow.       PHYSICIAN INTERPRETATION:  Left Ventricle: The left ventricular systolic function is normal, with a visually estimated ejection fraction of 65-70%. There are no regional wall motion abnormalities. The left ventricular cavity size is normal. Spectral Doppler shows an impaired relaxation pattern of left ventricular diastolic filling.  Left Atrium: The left atrium is normal in size.  Right Ventricle: The right ventricle is normal in size. There is normal right ventricular global systolic function.  Right Atrium: The right atrium is normal in size.  Aortic Valve: The aortic valve is trileaflet. The aortic valve dimensionless index is 1.05. There is mild to moderate aortic valve regurgitation. The peak instantaneous gradient of the aortic valve is 14.4 mmHg. The mean gradient of the aortic valve is 7.0 mmHg.  Mitral Valve: The mitral valve is normal in structure. There is no evidence of mitral valve regurgitation.  Tricuspid Valve: The tricuspid valve is structurally normal. No evidence of tricuspid  regurgitation.  Pulmonic Valve: The pulmonic valve is not well visualized. There is no indication of pulmonic valve regurgitation.  Pericardium: There is a trivial pericardial effusion.  Aorta: The aortic root is normal.  Systemic Veins: The inferior vena cava appears to be of normal size, IVC inspiratory collapse greater than 50%.       CONCLUSIONS:   1. The left ventricular systolic function is normal, with a visually estimated ejection fraction of 65-70%.   2. Spectral Doppler shows an impaired relaxation pattern of left ventricular diastolic filling.   3. There is normal right ventricular global systolic function.   4. Mild to moderate aortic valve regurgitation.    QUANTITATIVE DATA SUMMARY:     2D MEASUREMENTS:           Normal Ranges:  Ao Root d:       3.10 cm   (2.0-3.7cm)  LAs:             3.00 cm   (2.7-4.0cm)  IVSd:            0.87 cm   (0.6-1.1cm)  LVPWd:           0.76 cm   (0.6-1.1cm)  LVIDd:           3.92 cm   (3.9-5.9cm)  LVIDs:           2.31 cm  LV Mass Index:   63.9 g/m2  LV % FS          41.1 %       LA VOLUME:                   Normal Ranges:  LA Vol A4C:        13.2 ml   (22+/-6mL/m2)  LA Vol A2C:        13.6 ml  LA Vol BP:         13.5 ml  LA Vol Index A4C:  9.1ml/m2  LA Vol Index A2C:  9.3 ml/m2  LA Vol Index BP:   9.3 ml/m2  LA Area A4C:       7.8 cm2  LA Area A2C:       7.8 cm2  LA Major Axis A4C: 4.0 cm  LA Major Axis A2C: 3.9 cm  LA Volume Index:   9.0 ml/m2  LA Vol A4C:        13.1 ml  LA Vol A2C:        13.5 ml  LA Vol Index BSA:  9.1 ml/m2       AORTA MEASUREMENTS:         Normal Ranges:  Asc Ao, d:          3.40 cm (2.1-3.4cm)       LV SYSTOLIC FUNCTION BY 2D PLANIMETRY (MOD):                       Normal Ranges:  EF-A4C View:    53 % (>=55%)  EF-A2C View:    48 %  EF-Biplane:     50 %  EF-Visual:      68 %  LV EF Reported: 68 %       LV DIASTOLIC FUNCTION:           Normal Ranges:  MV e'                  0.068 m/s (>8.0)  MV lateral e'          0.08 m/s  MV medial e'           0.06  m/s       AORTIC VALVE:                      Normal Ranges:  AoV Vmax:                1.90 m/s  (<=1.7m/s)  AoV Peak P.4 mmHg (<20mmHg)  AoV Mean P.0 mmHg  (1.7-11.5mmHg)  LVOT Max Duarte:            1.93 m/s  (<=1.1m/s)  AoV VTI:                 33.30 cm  (18-25cm)  LVOT VTI:                35.00 cm  LVOT Diameter:           1.70 cm   (1.8-2.4cm)  AoV Area, VTI:           2.39 cm2  (2.5-5.5cm2)  AoV Area,Vmax:           2.31 cm2  (2.5-4.5cm2)  AoV Dimensionless Index: 1.05       RIGHT VENTRICLE:  RV Basal 3.30 cm  RV Mid   2.50 cm  RV Major 6.6 cm  RV s'    0.22 m/s       [id number] Eduin García MD  Electronically signed on 2024 at 11:30:20 AM       ** Final **      Physical Exam    Relevant Results               Assessment/Plan      83-year-old female with a past medical history of dementia, osteoporosis, UTI, falls, right hip surgery rehabbing at the skilled nursing home recently presented with diarrhea dehydration and UTI.  She was hyponatremic, hypokalemic and dehydrated on admission.  She has a right lower extremity swelling and a DVT present on admission.She started Eliquis.  Troponins were elevated.  She did have a lactic acidosis but is improved.  Blood pressures improved.  Patient was initially going to be transferred to [place] but then per orthopedic evaluation patient can do outpatient follow-up for orthopedic surgery.  Patient is currently being treated for acute dehydration and UTI.     Assessment  Syncope resolved  Acute dehydration improving  Acute UTI  Acute DVT  Advanced dementia  History of recent hip surgery  Hypokalemia  Anemia  Leukocytosis  NSTEMI type II  GI bleed     Plan  Patient is more awake and alert today  Will order diet  Continue IV ceftriaxone  Continue Eliquis  Continue IV fluids  Potassium was 2.8.  Will order IV potassium and recheck tomorrow  Patient fecal occult blood is positive but her hemoglobin has been stable.  She has chronic low stable  hemoglobin.  Will continue to monitor for major bleeding.  If not patient can be referred to outpatient colonoscopy  He will continue Eliquis in the setting of acute DVT.  Continue current ongoing supportive and symptomatic care  Transfer to [place] has been canceled.  Patient can have outpatient orthopedic follow-up  Troponins elevated likely secondary to NSTEMI.  No intervention required.  Echo normal  Iron studies reviewed, no intervention.  Vitamin B12 adequate, TSH within normal limits  Patient impaired mentation/disorientation likely from acute dehydration from UTI.  Will continue to do supportive and symptomatic care and discharged back to nursing facility  Plan for DC tomorrow    Continue home medications  DVT prophylaxis: Eliquis  GI prophylaxis: Protonix                 Jimmie Whiting MD

## 2024-09-15 NOTE — PROGRESS NOTES
Subjective Data:  Patient reports feeling well, no new adverse events overnight. Patient denies any chest pain, shortness of breath, palpitations, dizziness or syncope. Patient is hemodynamically stable.     Overnight Events:    No     Objective Data:  Last Recorded Vitals:  Vitals:    09/14/24 1352 09/14/24 1552 09/14/24 2100 09/15/24 0719   BP:  135/63 127/75 118/58   BP Location:  Left arm Left arm Left arm   Patient Position:  Lying Lying Lying   Pulse:  92 87 69   Resp:   18 20   Temp:  36.3 °C (97.3 °F) 36.3 °C (97.3 °F) 36.2 °C (97.2 °F)   TempSrc:  Oral Temporal Temporal   SpO2: 93% 96% 96% 97%   Weight:       Height:           Last Labs:  CBC - 9/15/2024:  8:45 AM  10.1 7.9 393    26.0      CMP - 9/15/2024:  8:45 AM  7.9 4.7 19 --- 1.4   3.3 2.7 7 186      PTT - 9/12/2024:  9:48 AM  1.0   11.9 23     TROPHS   Date/Time Value Ref Range Status   09/12/2024 11:00 AM 17 0 - 13 ng/L Final   09/12/2024 09:48 AM 14 0 - 13 ng/L Final   09/07/2024 05:35 PM 22 0 - 13 ng/L Final     BNP   Date/Time Value Ref Range Status   09/07/2024 04:05  0 - 99 pg/mL Final      Last I/O:  I/O last 3 completed shifts:  In: 2771.7 (58.1 mL/kg) [P.O.:130; I.V.:2641.7 (55.4 mL/kg)]  Out: 1 (0 mL/kg) [Stool:1]  Weight: 47.7 kg     Past Cardiology Tests (Last 3 Years):  EKG:  ECG 12 lead 09/12/2024 (Preliminary)      ECG 12 lead 09/07/2024    Echo:  Transthoracic Echo (TTE) Complete 09/13/2024    Ejection Fractions:  EF   Date/Time Value Ref Range Status   09/13/2024 05:47 AM 68 %      Cath:  No results found for this or any previous visit from the past 1095 days.    Stress Test:  No results found for this or any previous visit from the past 1095 days.    Cardiac Imaging:  No results found for this or any previous visit from the past 1095 days.      Inpatient Medications:  Scheduled medications   Medication Dose Route Frequency    apixaban  10 mg oral BID    Followed by    [START ON 9/20/2024] apixaban  5 mg oral BID    ascorbic  acid  500 mg oral Daily    cefTRIAXone  1 g intravenous q24h    ferrous sulfate (325 mg ferrous sulfate)  65 mg of iron oral Daily    memantine  10 mg oral BID    potassium chloride  20 mEq intravenous q2h     PRN medications   Medication    acetaminophen    ketoconazole    oxygen     Continuous Medications   Medication Dose Last Rate    sodium chloride 0.9%  100 mL/hr 100 mL/hr (09/15/24 0600)       Physical Exam:  General: alert and in no acute distress  HEENT: NC/AT; EOMI; PERRLA, external ear is normal  Neck: supple; trachea midline; no masses; no JVD  Chest: clear breath sounds bilaterally; no wheezing  Cardio: regular rhythm, S1S2 normal, no murmurs  Abdomen: Soft, non-tender, non-distension, no organomegaly  Extremities: No leg edema     Assessment/Plan     Mrs. Dinah Parmar is a 83 y.o. female being consulted by the Cardiology team for syncope. Patient with prior medical history significant for dementia, osteoporosis, urge incontinence, recent R hip fracture (9/1/2024). Patient with multiple recent admissions due to multiple falls including R hip fracture. She was brought from SNF to ED [facility] on 09/12/2024 after an unwitnessed fall and unresponsiveness. Patient is a poor historian and cannot give reliable information at this point. History has been obtained from previous records. Per chart review,  she was found to have a right intertrochanteric hip fracture for which she underwent surgical fixation on 9/2/2024. Patient was found to have a T9 compression fracture with no involvement of the posterior structures, no epidural hematoma and no severe spinal canal stenosis. She was asymptomatic from that regard. The fracture was biomechanically stable. No neuro- surgical intervention is indicated. She was brought by squad with unresponsive with low blood pressure. The patient's last well-known was at 8:30 AM on 09/12/2024. Patient was treated with push dose epinephrine by EMS and patient had mild improvement  of her blood pressure and the patient was able to communicate at the arrival to the ED she was complaining of abdominal pain symptoms. Patient had a code stroke that was noted at 9:27 AM when the patient arrived to the trauma bay. After initial assessment of the patient she was taken mainly to the CT scan for potential stroke workup she was ordered a CT scan of the brain in addition to his CT scan imaging of the abdomen pelvis. Patient returned to the room 1 hour later with no complains. EKG shows normal sinus rhythm with PACs and no signs of acute ischemic changes. Trop 14-17. Patient has been admitted for clinical compensation.     Assessment      # Syncope / Acute DVT  - She was brought by squad with unresponsive with low blood pressure.  - Patient was treated with push dose epinephrine by EMS and patient had mild improvement of her blood pressure and the patient was able to communicate.   - Trop 14-17  - EKG shows normal sinus rhythm with PACs and no signs of acute ischemic changes.  - Echocardiogram (09/13/2024):   1. The left ventricular systolic function is normal, with a visually estimated ejection fraction of 65-70%.   2. Spectral Doppler shows an impaired relaxation pattern of left ventricular diastolic filling.   3. There is normal right ventricular global systolic function.   4. Mild to moderate aortic valve regurgitation.  - US carotids:  Right Carotid: Findings are consistent with less than 50% stenosis of the right proximal internal carotid artery. Right external carotid artery appears patent with no evidence of stenosis. The right vertebral artery is patent with antegrade flow. No evidence of hemodynamically significant stenosis in the right subclavian artery.  Left Carotid: Findings are consistent with less than 50% stenosis of the left proximal internal carotid artery. Left external carotid artery appears patent with no evidence of stenosis. The left vertebral artery is patent with antegrade flow.  No evidence of hemodynamically significant stenosis in the left subclavian artery.  - US venous LE:  Critical Result: Positive for acute DVT of right peroneal vein and ?age of the distal right FV.  CONCLUSIONS:  Right Lower Venous: There is acute occlusive deep vein thrombosis visualized in the peroneal vein. There is age indeterminate deep vein thrombosis visualized in the distal femoral vein. Technically difficult due to the patients position.  Left Lower Venous: Negative for acute DVT of the visualized vessel. The calf veins are poorly visualized due to the patients position.  - Would suggest to continue current medical management per primary team.    - Would suggest to keep DOAC - Apixaban.  - Would suggest to keep Metoprolol succinate 25mg daily.  - Would suggest 7-day holter monitor upon discharge and follow up with Cardiology team.     Thank you for allowing me to participate in the care of this patient. Please reach me out if you have any questions or if you need any clarifications regarding the patient's care.     Peripheral IV 09/15/24 22 G Right Forearm (Active)   Site Assessment Clean;Dry;Intact 09/15/24 0600   Dressing Type Tape 09/15/24 0600   Line Status Infusing 09/15/24 0600   Dressing Status Clean;Dry 09/15/24 0600   Number of days: 0       Code Status:  Full Code    Ten Rios MD  Cardiology

## 2024-09-15 NOTE — PROGRESS NOTES
Physical Therapy    Physical Therapy Treatment    Patient Name: Dinah Parmar  MRN: [id number]  Today's Date: 9/15/2024  Time Calculation  Start Time: 1118  Stop Time: 1141  Time Calculation (min): 23 min     319/319-A    Assessment/Plan   PT Assessment  PT Assessment Results: Decreased strength, Decreased endurance, Impaired balance, Decreased mobility, Decreased cognition, Impaired judgement, Decreased safety awareness  Rehab Prognosis: Fair (due to dementia)  Barriers to Discharge: dementia, inability to ambulate  Evaluation/Treatment Tolerance: Patient limited by fatigue  Barriers to Participation: Comorbidities  End of Session Communication: Bedside nurse (also communication on white board)  Assessment Comment: Patient demo's painful motion with R hip. Patient requires UE assist for hip flexion with RLE. Patient requires cueing for STS to stand straight. Patient demo's challenges with navigating in chair this date.  End of Session Patient Position: Up in chair, Alarm on ( in room)     PT Plan  Treatment/Interventions: Bed mobility, Transfer training, Balance training, Neuromuscular re-education, Strengthening, Endurance training, Therapeutic exercise, Therapeutic activity  PT Plan: Ongoing PT  PT Frequency: 4 times per week  PT Discharge Recommendations: Moderate intensity level of continued care  Equipment Recommended upon Discharge: Wheelchair  PT Recommended Transfer Status: Total assist  PT - OK to Discharge: Yes (once medically appropriate)    Current Problem:  Patient Active Problem List   Diagnosis    Kyphosis (acquired) (postural)    Vaginal dryness    Vascular dementia without behavioral disturbance (Multi)    Age-related osteoporosis without current pathological fracture    Urge incontinence of urine    Generalized weakness    Vitamin D deficiency    Pain of right clavicle    Fall in home    Arthralgia of hip    Acute low back pain    Syncope, unspecified syncope type       General Visit  Information:   PT  Visit  PT Received On: 09/15/24  General  General Comment: Patient states that she's not having pain currently.  Subjective     Precautions:  Precautions  LE Weight Bearing Status: Weight Bearing as Tolerated  Medical Precautions: Fall precautions    Vital Signs:  Vital Signs  Heart Rate: 70  SpO2: 95 %  Objective     Pain:  Pain Assessment  Pain Assessment: 0-10  0-10 (Numeric) Pain Score: 0 - No pain    Treatments:  Therapeutic Exercise  Therapeutic Exercise Performed: Yes  Therapeutic Exercise Activity 1: Seated ankle pumps  Therapeutic Exercise Activity 2: Seated LAQ's  Therapeutic Exercise Activity 3: Seated marches  Therapeutic Exercise Activity 4: Seated L hip abduction              Transfers  Transfer: Yes  Transfer 1  Transfer From 1: Sit to  Transfer to 1: Stand  Technique 1: Sit to stand  Transfer Device 1: Walker  Transfer Level of Assistance 1: Moderate assistance  Trials/Comments 1: Patient requires cueing and increased time for standing. Requires cues for proper hand placement.          Outcome Measures:     [facility] Basic Mobility  Turning from your back to your side while in a flat bed without using bedrails: A lot  Moving from lying on your back to sitting on the side of a flat bed without using bedrails: A lot  Moving to and from bed to chair (including a wheelchair): A lot  Standing up from a chair using your arms (e.g. wheelchair or bedside chair): A lot  To walk in hospital room: Total  Climbing 3-5 steps with railing: Total  Basic Mobility - Total Score: 10                                      Education Documentation  Mobility Training, taught by Barbara Gar PTA at 9/15/2024 11:48 AM.  Learner: Patient  Readiness: Acceptance  Method: Explanation  Response: No Evidence of Learning    Education Comments  No comments found.           EDUCATION:     Encounter Problems       Encounter Problems (Active)       PT Problem       PT Goal 1 (Progressing)       Start:  09/13/24     Expected End:  09/26/24       Dinah Parmar will perform bed mobility for supine to and from sitting EOB without use of rail with modA x1           PT Goal 2 (Progressing)       Start:  09/13/24    Expected End:  09/26/24       Dinah Parmar will transfer sit to and from stand using least restrictive assistive device modA x1           PT Goal 3 (Progressing)       Start:  09/13/24    Expected End:  09/26/24       Dinah Parmar will transfer squat pivot bed to/from chair with modA x1              Pain - Adult

## 2024-09-15 NOTE — PROGRESS NOTES
Adrian Parmar is a 83 y.o. female on day 3 of admission presenting with Syncope, unspecified syncope type.      Subjective      Patient seen and examined bedside.  She seems very confused.      Objective     Last Recorded Vitals  /58 (BP Location: Left arm, Patient Position: Lying)   Pulse 69   Temp 36.2 °C (97.2 °F) (Temporal)   Resp 20   Wt 47.7 kg (105 lb 3.2 oz)   SpO2 97%   Intake/Output last 3 Shifts:    Intake/Output Summary (Last 24 hours) at 9/15/2024 0837  Last data filed at 9/15/2024 0200  Gross per 24 hour   Intake 670 ml   Output 1 ml   Net 669 ml       Admission Weight  Weight: 48.5 kg (106 lb 14.8 oz) (09/12/24 0944)    Daily Weight  09/12/24 : 47.7 kg (105 lb 3.2 oz)    Image Results  Vascular US carotid [place] [address]  Phone [phone] [phone], Fax [phone]       Vascular Lab Report     [facility] US CAROTID ARTERY DUPLEX BILATERAL    Patient Name:      ADRIAN PARMAR            Reading Physician:  [id number] Adal Roach MD, RPVI  Study Date:        9/13/2024             Ordering Provider:  [id number] LORI BACK  MRN/PID:           [id number]              Fellow:  Accession#:        [id number]          Technologist:       Audra Dawson RVT,                                                               [facility]  Date of Birth/Age: 1940 / 83 years Technologist 2:  Gender:            F                     Encounter#:         [id number]  Admission Status:  Inpatient             Location Performed: [facility]       Diagnosis/ICD: Other specified symptoms and signs involving the circulatory and                 respiratory systems-R09.89  CPT Codes:     74234 Cerebrovascular Carotid Duplex scan complete       CONCLUSIONS:  Right Carotid: Findings are consistent with less than 50%  stenosis of the right proximal internal carotid artery. Right external carotid artery appears patent with no evidence of stenosis. The right vertebral artery is patent with antegrade flow. No evidence of hemodynamically significant stenosis in the right subclavian artery.  Left Carotid: Findings are consistent with less than 50% stenosis of the left proximal internal carotid artery. Left external carotid artery appears patent with no evidence of stenosis. The left vertebral artery is patent with antegrade flow. No evidence of hemodynamically significant stenosis in the left subclavian artery.     Imaging & Doppler Findings:  Right Plaque Morph: The proximal right internal carotid artery demonstrates heterogenous plaque.  Left Plaque Morph: The proximal left internal carotid artery demonstrates heterogenous plaque.      Right                        Left    PSV      EDV                PSV      EDV  114 cm/s 98 cm/s   CCA P    109 cm/s 4 cm/s  89 cm/s  10 cm/s   CCA D    102 cm/s 8 cm/s  111 cm/s 9 cm/s    ICA P    69 cm/s  13 cm/s  81 cm/s  8 cm/s    ICA M    104 cm/s 16 cm/s  109 cm/s 13 cm/s   ICA D    118 cm/s 24 cm/s  132 cm/s            ECA     118 cm/s  149 cm/s         Vertebral  92 cm/s  6 cm/s  156 cm/s         Subclavian 182 cm/s                     Right Left  ICA/CCA Ratio  1.2  0.7          [id number] dAal Roach MD, RPVI  Electronically signed by [id number] Adal Roach MD, RPVI on 9/13/2024 at 9:50:10 PM       ** Final **  Lower extremity venous [place] [address]  Phone [phone], Fax [phone]       Vascular Lab Report     [facility] US LOWER EXTREMITY VENOUS DUPLEX BILATERAL    Patient Name:      ADRIAN GREER            Cecilio Physician:  [id number] Adal Roach MD, RPVI  Study Date:        9/13/2024             Ordering Provider:  Mary MONTEZ  MRN/PID:            [id number]              Fellow:  Accession#:        [id number]          Technologist:       Audra Dawson RVT,                                                               [facility]  Date of Birth/Age: 1940 / 83 years Technologist 2:  Gender:            F                     Encounter#:         [id number]  Admission Status:  Inpatient             Location Performed: [facility]       Diagnosis/ICD: Localized (leg) edema-R60.0  CPT Codes:     00600 Peripheral venous duplex scan for DVT complete       **CRITICAL RESULT**  Critical Result: Positive for acute DVT of right peroneal vein and ?age of the distal right FV.  Notification called to Dr Whitman on 9/13/2024 at 4:36:57 PM by Audra Dawson RVT.     CONCLUSIONS:  Right Lower Venous: There is acute occlusive deep vein thrombosis visualized in the peroneal vein. There is age indeterminate deep vein thrombosis visualized in the distal femoral vein. Technically difficult due to the patients position.  Left Lower Venous: Negative for acute DVT of the visualized vessel. The calf veins are poorly visualized due to the patients position.     Imaging & Doppler Findings:     Right                 Compressible    Thrombus            Flow  Distal External Iliac                   None  CFV                       Yes           None       Spontaneous/Phasic  PFV                       Yes           None  FV Proximal               Yes           None       Spontaneous/Phasic  FV Mid                    Yes           None  FV Distal               Partial         ? Age  Popliteal                 Yes           None       Spontaneous/Phasic  Peroneal                   No      Acute occlusive  PTV                       Yes           None       Left                  Compress Thrombus        Flow  Distal External Iliac            None  CFV                     Yes      None   Spontaneous/Phasic  PFV                      Yes      None  FV Proximal             Yes      None   Spontaneous/Phasic  FV Mid                  Yes      None  FV Distal               Yes      None  Popliteal               Yes      None   Spontaneous/Phasic       [id number] Adal Roach MD, RPVI  Electronically signed by [id number] ESA Romo MD on 9/13/2024 at 9:46:19 PM       ** Final **  ECG 12 lead  Normal sinus rhythm  Cannot rule out Anterior infarct , age undetermined  Abnormal ECG  No previous ECGs available  See ED provider note for full interpretation and clinical correlation  Confirmed by Trisha Whittington ([id number]) on 9/13/2024 9:31:31 PM  Transthoracic Echo (TTE) [place] [address]  Phone [phone], Fax [phone]    TRANSTHORACIC ECHOCARDIOGRAM REPORT    Patient Name:      ADRIAN Hernandes Physician:    [id number] Eduin García MD  Study Date:        9/13/2024             Ordering Provider:    [id number] YASH SCHAEFFER  MRN/PID:           [id number]              Fellow:  Accession#:        [id number]          Nurse:  Date of Birth/Age: 1940 / 83 years Sonographer:          Drake Noe RDCS  Gender:            F                     Additional Staff:  Height:            157.48 cm             Admit Date:  Weight:            47.63 kg              Admission Status:     Inpatient -                                                                 Routine  BSA / BMI:         1.45 m2 / 19.20 kg/m2 Department Location:  [address]  Blood Pressure: 114 /65 mmHg    Study Type:    TRANSTHORACIC ECHO (TTE) COMPLETE  Diagnosis/ICD: Unspecified systolic (congestive) heart failure (CHF)-I50.20;                 Syncope-R55  CPT Codes:     Echo Complete w Full  Doppler-[id number]   Study Detail: The following Echo studies were performed: 2D, M-Mode, Doppler and                color flow.       PHYSICIAN INTERPRETATION:  Left Ventricle: The left ventricular systolic function is normal, with a visually estimated ejection fraction of 65-70%. There are no regional wall motion abnormalities. The left ventricular cavity size is normal. Spectral Doppler shows an impaired relaxation pattern of left ventricular diastolic filling.  Left Atrium: The left atrium is normal in size.  Right Ventricle: The right ventricle is normal in size. There is normal right ventricular global systolic function.  Right Atrium: The right atrium is normal in size.  Aortic Valve: The aortic valve is trileaflet. The aortic valve dimensionless index is 1.05. There is mild to moderate aortic valve regurgitation. The peak instantaneous gradient of the aortic valve is 14.4 mmHg. The mean gradient of the aortic valve is 7.0 mmHg.  Mitral Valve: The mitral valve is normal in structure. There is no evidence of mitral valve regurgitation.  Tricuspid Valve: The tricuspid valve is structurally normal. No evidence of tricuspid regurgitation.  Pulmonic Valve: The pulmonic valve is not well visualized. There is no indication of pulmonic valve regurgitation.  Pericardium: There is a trivial pericardial effusion.  Aorta: The aortic root is normal.  Systemic Veins: The inferior vena cava appears to be of normal size, IVC inspiratory collapse greater than 50%.       CONCLUSIONS:   1. The left ventricular systolic function is normal, with a visually estimated ejection fraction of 65-70%.   2. Spectral Doppler shows an impaired relaxation pattern of left ventricular diastolic filling.   3. There is normal right ventricular global systolic function.   4. Mild to moderate aortic valve regurgitation.    QUANTITATIVE DATA SUMMARY:     2D MEASUREMENTS:           Normal Ranges:  Ao Root d:       3.10 cm   (2.0-3.7cm)  LAs:              3.00 cm   (2.7-4.0cm)  IVSd:            0.87 cm   (0.6-1.1cm)  LVPWd:           0.76 cm   (0.6-1.1cm)  LVIDd:           3.92 cm   (3.9-5.9cm)  LVIDs:           2.31 cm  LV Mass Index:   63.9 g/m2  LV % FS          41.1 %       LA VOLUME:                   Normal Ranges:  LA Vol A4C:        13.2 ml   (22+/-6mL/m2)  LA Vol A2C:        13.6 ml  LA Vol BP:         13.5 ml  LA Vol Index A4C:  9.1ml/m2  LA Vol Index A2C:  9.3 ml/m2  LA Vol Index BP:   9.3 ml/m2  LA Area A4C:       7.8 cm2  LA Area A2C:       7.8 cm2  LA Major Axis A4C: 4.0 cm  LA Major Axis A2C: 3.9 cm  LA Volume Index:   9.0 ml/m2  LA Vol A4C:        13.1 ml  LA Vol A2C:        13.5 ml  LA Vol Index BSA:  9.1 ml/m2       AORTA MEASUREMENTS:         Normal Ranges:  Asc Ao, d:          3.40 cm (2.1-3.4cm)       LV SYSTOLIC FUNCTION BY 2D PLANIMETRY (MOD):                       Normal Ranges:  EF-A4C View:    53 % (>=55%)  EF-A2C View:    48 %  EF-Biplane:     50 %  EF-Visual:      68 %  LV EF Reported: 68 %       LV DIASTOLIC FUNCTION:           Normal Ranges:  MV e'                  0.068 m/s (>8.0)  MV lateral e'          0.08 m/s  MV medial e'           0.06 m/s       AORTIC VALVE:                      Normal Ranges:  AoV Vmax:                1.90 m/s  (<=1.7m/s)  AoV Peak P.4 mmHg (<20mmHg)  AoV Mean P.0 mmHg  (1.7-11.5mmHg)  LVOT Max Duarte:            1.93 m/s  (<=1.1m/s)  AoV VTI:                 33.30 cm  (18-25cm)  LVOT VTI:                35.00 cm  LVOT Diameter:           1.70 cm   (1.8-2.4cm)  AoV Area, VTI:           2.39 cm2  (2.5-5.5cm2)  AoV Area,Vmax:           2.31 cm2  (2.5-4.5cm2)  AoV Dimensionless Index: 1.05       RIGHT VENTRICLE:  RV Basal 3.30 cm  RV Mid   2.50 cm  RV Major 6.6 cm  RV s'    0.22 m/s       [id number] Eduin García MD  Electronically signed on 2024 at 11:30:20 AM       ** Final **      Physical Exam  Constitutional:       Comments: Confused disoriented   HENT:      Head: Normocephalic  and atraumatic.      Nose: Nose normal.      Mouth/Throat:      Mouth: Mucous membranes are moist.   Eyes:      Pupils: Pupils are equal, round, and reactive to light.   Cardiovascular:      Rate and Rhythm: Normal rate and regular rhythm.      Pulses: Normal pulses.   Pulmonary:      Effort: Pulmonary effort is normal.   Abdominal:      Palpations: Abdomen is soft.   Musculoskeletal:      Cervical back: Normal range of motion.   Skin:     General: Skin is dry.   Neurological:      General: No focal deficit present.      Comments: Advanced dementia         Relevant Results           Scheduled medications  apixaban, 10 mg, oral, BID   Followed by  [START ON 9/20/2024] apixaban, 5 mg, oral, BID  ascorbic acid, 500 mg, oral, Daily  cefTRIAXone, 1 g, intravenous, q24h  ferrous sulfate (325 mg ferrous sulfate), 65 mg of iron, oral, Daily  memantine, 10 mg, oral, BID  potassium chloride, 20 mEq, intravenous, q2h      Continuous medications  sodium chloride 0.9%, 100 mL/hr, Last Rate: 100 mL/hr (09/15/24 0600)      PRN medications  PRN medications: acetaminophen, ketoconazole, oxygen      Assessment/Plan      83-year-old female with a past medical history of dementia, osteoporosis, UTI, falls, right hip surgery rehabbing at the skilled nursing home recently presented with diarrhea dehydration and UTI.  She was hyponatremic, hypokalemic and dehydrated on admission.  She has a right lower extremity swelling and a DVT present on admission.She started Eliquis.  Troponins were elevated.  She did have a lactic acidosis but is improved.  Blood pressures improved.  Patient was initially going to be transferred to [place] but then per orthopedic evaluation patient can do outpatient follow-up for orthopedic surgery.  Patient is currently being treated for acute dehydration and UTI.    Assessment  Syncope resolved  Acute dehydration improving  Acute UTI  Acute DVT  Advanced dementia  History of recent hip  surgery  Hypokalemia  Anemia  Leukocytosis  NSTEMI type II  GI bleed    Plan  Continue current ongoing care  Continue IV antibiotics  Continue Eliquis  Patient is disoriented keep her n.p.o. for now  Continue IV fluids  Spoke with orthopedic physician who at this time advised patient can be followed up as outpatient for orthopedic care.  At this time we will continue to treat her urine symptoms.  Iron studies reviewed, iron stores adequate looks like inflammatory picture, vitamin B12 within normal limits, TSH within normal limits  Will continue to assess mentation and reassess  Repeat electrolytes as needed.  Potassium repleted today  Bowel regimen  NSTEMI likely secondary demand ischemia.  Echocardiogram reviewed EF 65 to 70%, shows left ventricular impaired diastolic filling  Continue home medication  Patient fecal occult blood is positive.  Will continue to watch her hemoglobin.  We can defer outpatient colonoscopy if the patient is stable  DVT prophylaxis: Eliquis  GI prophylaxis: Protonix          Jimmie Whiting MD

## 2024-09-16 LAB
ANION GAP SERPL CALC-SCNC: 10 MMOL/L (ref 10–20)
BACTERIA BLD CULT: NORMAL
BUN SERPL-MCNC: 3 MG/DL (ref 6–23)
C COLI+JEJ+UPSA DNA STL QL NAA+PROBE: NOT DETECTED
CALCIUM SERPL-MCNC: 8.2 MG/DL (ref 8.6–10.3)
CHLORIDE SERPL-SCNC: 109 MMOL/L (ref 98–107)
CO2 SERPL-SCNC: 22 MMOL/L (ref 21–32)
CREAT SERPL-MCNC: 0.38 MG/DL (ref 0.5–1.05)
EC STX1 GENE STL QL NAA+PROBE: NOT DETECTED
EC STX2 GENE STL QL NAA+PROBE: NOT DETECTED
EGFRCR SERPLBLD CKD-EPI 2021: >90 ML/MIN/1.73M*2
ERYTHROCYTE [DISTWIDTH] IN BLOOD BY AUTOMATED COUNT: 15.1 % (ref 11.5–14.5)
GLUCOSE SERPL-MCNC: 79 MG/DL (ref 74–99)
HCT VFR BLD AUTO: 25.8 % (ref 36–46)
HGB BLD-MCNC: 8.3 G/DL (ref 12–16)
MCH RBC QN AUTO: 32 PG (ref 26–34)
MCHC RBC AUTO-ENTMCNC: 32.2 G/DL (ref 32–36)
MCV RBC AUTO: 100 FL (ref 80–100)
NOROVIRUS GI + GII RNA STL NAA+PROBE: NOT DETECTED
NRBC BLD-RTO: 0 /100 WBCS (ref 0–0)
PLATELET # BLD AUTO: 446 X10*3/UL (ref 150–450)
POTASSIUM SERPL-SCNC: 3.3 MMOL/L (ref 3.5–5.3)
RBC # BLD AUTO: 2.59 X10*6/UL (ref 4–5.2)
RV RNA STL NAA+PROBE: NOT DETECTED
SALMONELLA DNA STL QL NAA+PROBE: NOT DETECTED
SHIGELLA DNA SPEC QL NAA+PROBE: NOT DETECTED
SODIUM SERPL-SCNC: 138 MMOL/L (ref 136–145)
V CHOLERAE DNA STL QL NAA+PROBE: NOT DETECTED
WBC # BLD AUTO: 7.3 X10*3/UL (ref 4.4–11.3)
Y ENTEROCOL DNA STL QL NAA+PROBE: NOT DETECTED

## 2024-09-16 PROCEDURE — 82565 ASSAY OF CREATININE: CPT | Performed by: STUDENT IN AN ORGANIZED HEALTH CARE EDUCATION/TRAINING PROGRAM

## 2024-09-16 PROCEDURE — 36415 COLL VENOUS BLD VENIPUNCTURE: CPT | Performed by: STUDENT IN AN ORGANIZED HEALTH CARE EDUCATION/TRAINING PROGRAM

## 2024-09-16 PROCEDURE — 2500000004 HC RX 250 GENERAL PHARMACY W/ HCPCS (ALT 636 FOR OP/ED): Performed by: INTERNAL MEDICINE

## 2024-09-16 PROCEDURE — 94761 N-INVAS EAR/PLS OXIMETRY MLT: CPT

## 2024-09-16 PROCEDURE — 99232 SBSQ HOSP IP/OBS MODERATE 35: CPT | Performed by: STUDENT IN AN ORGANIZED HEALTH CARE EDUCATION/TRAINING PROGRAM

## 2024-09-16 PROCEDURE — 2500000001 HC RX 250 WO HCPCS SELF ADMINISTERED DRUGS (ALT 637 FOR MEDICARE OP): Performed by: HOSPITALIST

## 2024-09-16 PROCEDURE — 2500000001 HC RX 250 WO HCPCS SELF ADMINISTERED DRUGS (ALT 637 FOR MEDICARE OP): Performed by: INTERNAL MEDICINE

## 2024-09-16 PROCEDURE — 85027 COMPLETE CBC AUTOMATED: CPT | Performed by: STUDENT IN AN ORGANIZED HEALTH CARE EDUCATION/TRAINING PROGRAM

## 2024-09-16 PROCEDURE — 2500000005 HC RX 250 GENERAL PHARMACY W/O HCPCS: Performed by: HOSPITALIST

## 2024-09-16 PROCEDURE — 2500000004 HC RX 250 GENERAL PHARMACY W/ HCPCS (ALT 636 FOR OP/ED): Performed by: STUDENT IN AN ORGANIZED HEALTH CARE EDUCATION/TRAINING PROGRAM

## 2024-09-16 PROCEDURE — 1200000002 HC GENERAL ROOM WITH TELEMETRY DAILY

## 2024-09-16 PROCEDURE — 2500000004 HC RX 250 GENERAL PHARMACY W/ HCPCS (ALT 636 FOR OP/ED): Performed by: HOSPITALIST

## 2024-09-16 ASSESSMENT — COGNITIVE AND FUNCTIONAL STATUS - GENERAL
MOBILITY SCORE: 8
EATING MEALS: A LOT
STANDING UP FROM CHAIR USING ARMS: TOTAL
DRESSING REGULAR UPPER BODY CLOTHING: TOTAL
CLIMB 3 TO 5 STEPS WITH RAILING: TOTAL
TURNING FROM BACK TO SIDE WHILE IN FLAT BAD: A LOT
MOVING FROM LYING ON BACK TO SITTING ON SIDE OF FLAT BED WITH BEDRAILS: A LOT
DRESSING REGULAR LOWER BODY CLOTHING: TOTAL
PERSONAL GROOMING: TOTAL
WALKING IN HOSPITAL ROOM: TOTAL
DAILY ACTIVITIY SCORE: 7
HELP NEEDED FOR BATHING: TOTAL
MOVING TO AND FROM BED TO CHAIR: TOTAL
TOILETING: TOTAL

## 2024-09-16 ASSESSMENT — PAIN SCALES - GENERAL
PAINLEVEL_OUTOF10: 3
PAINLEVEL_OUTOF10: 0 - NO PAIN

## 2024-09-16 ASSESSMENT — PAIN SCALES - WONG BAKER: WONGBAKER_NUMERICALRESPONSE: NO HURT

## 2024-09-16 NOTE — PROGRESS NOTES
Care Transitions: Patient reviewed in care round meeting this AM. ADOD pending culture final results. [facility] communication checked in [facility]. Auth received, approved from 9/16 to 9/23. Hospital provider updated / informed via secure chat that auth received. Provider waiting for final culture results and susceptibility. Spoke with patient in room. Sitting up in chair. Spouse not at bedside currently. Patient pleasant, confused to discharge planning. Will update spouse on auth approval. Wendie Alejo RN/TCC    [phone] Message left for patient spouse Brandon Parmar @ [phone] to inform of auth approval to return to [facility]. Message left to return call with questions. Care team to follow. Wendie Alejo RN/TCC

## 2024-09-16 NOTE — PROGRESS NOTES
Adrian Parmar is a 83 y.o. female on day 4 of admission presenting with Syncope, unspecified syncope type.      Subjective   Patient seen and examined bedside.She is awake alert and oriented.  Does not have any new complaints.  She is getting ready to eat her breakfast      Objective         Expand All Collapse All    Adrian Parmar is a 83 y.o. female on day 3 of admission presenting with Syncope, unspecified syncope type.           Subjective     Patient seen and examined bedside.  She seems very confused.          Objective[]Expand by Default  Last Recorded Vitals  /58 (BP Location: Left arm, Patient Position: Lying)   Pulse 69   Temp 36.2 °C (97.2 °F) (Temporal)   Resp 20   Wt 47.7 kg (105 lb 3.2 oz)   SpO2 97%   Intake/Output last 3 Shifts:     Intake/Output Summary (Last 24 hours) at 9/15/2024 0837  Last data filed at 9/15/2024 0200      Gross per 24 hour   Intake 670 ml   Output 1 ml   Net 669 ml         Admission Weight  Weight: 48.5 kg (106 lb 14.8 oz) (09/12/24 0944)     Daily Weight  09/12/24 : 47.7 kg (105 lb 3.2 oz)     Image Results  Vascular US carotid artery duplex bilateral               [place] [address]  Phone [phone], Fax [phone]        Vascular Lab Report     [facility] US CAROTID ARTERY DUPLEX BILATERAL     Patient Name:      ADRIAN PARMAR            Reading Physician:  [id number] Adal Roach MD, RPVI  Study Date:        9/13/2024             Ordering Provider:  [id number] LORI BACK  MRN/PID:           [id number]              Fellow:  Accession#:        [id number]          Technologist:       Audra Dawson RVT,                                                               [facility]  Date of Birth/Age: 1940 / 83 years Technologist 2:  Gender:            F                     Encounter#:         [id number]  Admission Status:  Inpatient             Location Performed:  [facility]        Diagnosis/ICD: Other specified symptoms and signs involving the circulatory and                 respiratory systems-R09.89  CPT Codes:     54260 Cerebrovascular Carotid Duplex scan complete        CONCLUSIONS:  Right Carotid: Findings are consistent with less than 50% stenosis of the right proximal internal carotid artery. Right external carotid artery appears patent with no evidence of stenosis. The right vertebral artery is patent with antegrade flow. No evidence of hemodynamically significant stenosis in the right subclavian artery.  Left Carotid: Findings are consistent with less than 50% stenosis of the left proximal internal carotid artery. Left external carotid artery appears patent with no evidence of stenosis. The left vertebral artery is patent with antegrade flow. No evidence of hemodynamically significant stenosis in the left subclavian artery.     Imaging & Doppler Findings:  Right Plaque Morph: The proximal right internal carotid artery demonstrates heterogenous plaque.  Left Plaque Morph: The proximal left internal carotid artery demonstrates heterogenous plaque.      Right                        Left    PSV      EDV                PSV      EDV  114 cm/s 98 cm/s   CCA P    109 cm/s 4 cm/s  89 cm/s  10 cm/s   CCA D    102 cm/s 8 cm/s  111 cm/s 9 cm/s    ICA P    69 cm/s  13 cm/s  81 cm/s  8 cm/s    ICA M    104 cm/s 16 cm/s  109 cm/s 13 cm/s   ICA D    118 cm/s 24 cm/s  132 cm/s            ECA     118 cm/s  149 cm/s         Vertebral  92 cm/s  6 cm/s  156 cm/s         Subclavian 182 cm/s                      Right Left  ICA/CCA Ratio  1.2  0.7           [id number] Adal Roach MD, ESA  Electronically signed by [id number] Adal Roach MD, RPMISA on 9/13/2024 at 9:50:10 PM        ** Final **  Lower extremity venous duplex bilateral               [address] [address]  Phone  [phone] ext-[phone], Fax [phone]        Vascular Lab Report     VASC US LOWER EXTREMITY VENOUS DUPLEX BILATERAL     Patient Name:      ADRIAN Hernandes Physician:  [id number] Adal Roach MD, RPVI  Study Date:        9/13/2024             Ordering Provider:  [id number] MARTELL WHITMAN  MRN/PID:           [id number]              Fellow:  Accession#:        [id number]          Technologist:       Audra Dawson RVT,                                                               [facility]  Date of Birth/Age: 1940 / 83 years Technologist 2:  Gender:            F                     Encounter#:         [id number]  Admission Status:  Inpatient             Location Performed: [facility]        Diagnosis/ICD: Localized (leg) edema-R60.0  CPT Codes:     15031 Peripheral venous duplex scan for DVT complete        **CRITICAL RESULT**  Critical Result: Positive for acute DVT of right peroneal vein and ?age of the distal right FV.  Notification called to Dr Whitman on 9/13/2024 at 4:36:57 PM by Audra Dawson RVT.     CONCLUSIONS:  Right Lower Venous: There is acute occlusive deep vein thrombosis visualized in the peroneal vein. There is age indeterminate deep vein thrombosis visualized in the distal femoral vein. Technically difficult due to the patients position.  Left Lower Venous: Negative for acute DVT of the visualized vessel. The calf veins are poorly visualized due to the patients position.     Imaging & Doppler Findings:     Right                 Compressible    Thrombus            Flow  Distal External Iliac                   None  CFV                       Yes           None       Spontaneous/Phasic  PFV                       Yes           None  FV Proximal               Yes           None       Spontaneous/Phasic  FV Mid                    Yes           None  FV Distal                Partial         ? Age  Popliteal                 Yes           None       Spontaneous/Phasic  Peroneal                   No      Acute occlusive  PTV                       Yes           None        Left                  Compress Thrombus        Flow  Distal External Iliac            None  CFV                     Yes      None   Spontaneous/Phasic  PFV                     Yes      None  FV Proximal             Yes      None   Spontaneous/Phasic  FV Mid                  Yes      None  FV Distal               Yes      None  Popliteal               Yes      None   Spontaneous/Phasic        [id number] Adal Roach MD, RPVI  Electronically signed by [id number] Adal Roach MD, RPMISA on 9/13/2024 at 9:46:19 PM        ** Final **  ECG 12 lead  Normal sinus rhythm  Cannot rule out Anterior infarct , age undetermined  Abnormal ECG  No previous ECGs available  See ED provider note for full interpretation and clinical correlation  Confirmed by Trisha Whittington ([id number]) on 9/13/2024 9:31:31 PM  Transthoracic Echo (TTE) [place] [address]  Phone [phone], Fax [phone]     TRANSTHORACIC ECHOCARDIOGRAM REPORT     Patient Name:      ADRIAN Hernandes Physician:    [id number] Eduin García MD  Study Date:        9/13/2024             Ordering Provider:    [id number] YASH SCHAEFFER  MRN/PID:           [id number]              Fellow:  Accession#:        [id number]          Nurse:  Date of Birth/Age: 1940 / 83 years Sonographer:          Drake Noe RDCS  Gender:            F                     Additional Staff:  Height:            157.48 cm             Admit Date:  Weight:            47.63 kg              Admission Status:     Inpatient  -                                                                 Routine  BSA / BMI:         1.45 m2 / 19.20 kg/m2 Department Location:  [address]  Blood Pressure: 114 /65 mmHg     Study Type:    TRANSTHORACIC ECHO (TTE) COMPLETE  Diagnosis/ICD: Unspecified systolic (congestive) heart failure (CHF)-I50.20;                 Syncope-R55  CPT Codes:     Echo Complete w Full Doppler-70139   Study Detail: The following Echo studies were performed: 2D, M-Mode, Doppler and                color flow.        PHYSICIAN INTERPRETATION:  Left Ventricle: The left ventricular systolic function is normal, with a visually estimated ejection fraction of 65-70%. There are no regional wall motion abnormalities. The left ventricular cavity size is normal. Spectral Doppler shows an impaired relaxation pattern of left ventricular diastolic filling.  Left Atrium: The left atrium is normal in size.  Right Ventricle: The right ventricle is normal in size. There is normal right ventricular global systolic function.  Right Atrium: The right atrium is normal in size.  Aortic Valve: The aortic valve is trileaflet. The aortic valve dimensionless index is 1.05. There is mild to moderate aortic valve regurgitation. The peak instantaneous gradient of the aortic valve is 14.4 mmHg. The mean gradient of the aortic valve is 7.0 mmHg.  Mitral Valve: The mitral valve is normal in structure. There is no evidence of mitral valve regurgitation.  Tricuspid Valve: The tricuspid valve is structurally normal. No evidence of tricuspid regurgitation.  Pulmonic Valve: The pulmonic valve is not well visualized. There is no indication of pulmonic valve regurgitation.  Pericardium: There is a trivial pericardial effusion.  Aorta: The aortic root is normal.  Systemic Veins: The inferior vena cava appears to be of normal size, IVC inspiratory collapse greater than 50%.        CONCLUSIONS:   1. The left ventricular systolic function is normal, with a visually  estimated ejection fraction of 65-70%.   2. Spectral Doppler shows an impaired relaxation pattern of left ventricular diastolic filling.   3. There is normal right ventricular global systolic function.   4. Mild to moderate aortic valve regurgitation.     QUANTITATIVE DATA SUMMARY:     2D MEASUREMENTS:           Normal Ranges:  Ao Root d:       3.10 cm   (2.0-3.7cm)  LAs:             3.00 cm   (2.7-4.0cm)  IVSd:            0.87 cm   (0.6-1.1cm)  LVPWd:           0.76 cm   (0.6-1.1cm)  LVIDd:           3.92 cm   (3.9-5.9cm)  LVIDs:           2.31 cm  LV Mass Index:   63.9 g/m2  LV % FS          41.1 %        LA VOLUME:                   Normal Ranges:  LA Vol A4C:        13.2 ml   (22+/-6mL/m2)  LA Vol A2C:        13.6 ml  LA Vol BP:         13.5 ml  LA Vol Index A4C:  9.1ml/m2  LA Vol Index A2C:  9.3 ml/m2  LA Vol Index BP:   9.3 ml/m2  LA Area A4C:       7.8 cm2  LA Area A2C:       7.8 cm2  LA Major Axis A4C: 4.0 cm  LA Major Axis A2C: 3.9 cm  LA Volume Index:   9.0 ml/m2  LA Vol A4C:        13.1 ml  LA Vol A2C:        13.5 ml  LA Vol Index BSA:  9.1 ml/m2        AORTA MEASUREMENTS:         Normal Ranges:  Asc Ao, d:          3.40 cm (2.1-3.4cm)        LV SYSTOLIC FUNCTION BY 2D PLANIMETRY (MOD):                       Normal Ranges:  EF-A4C View:    53 % (>=55%)  EF-A2C View:    48 %  EF-Biplane:     50 %  EF-Visual:      68 %  LV EF Reported: 68 %        LV DIASTOLIC FUNCTION:           Normal Ranges:  MV e'                  0.068 m/s (>8.0)  MV lateral e'          0.08 m/s  MV medial e'           0.06 m/s        AORTIC VALVE:                      Normal Ranges:  AoV Vmax:                1.90 m/s  (<=1.7m/s)  AoV Peak P.4 mmHg (<20mmHg)  AoV Mean P.0 mmHg  (1.7-11.5mmHg)  LVOT Max Duarte:            1.93 m/s  (<=1.1m/s)  AoV VTI:                 33.30 cm  (18-25cm)  LVOT VTI:                35.00 cm  LVOT Diameter:           1.70 cm   (1.8-2.4cm)  AoV Area, VTI:           2.39 cm2   (2.5-5.5cm2)  AoV Area,Vmax:           2.31 cm2  (2.5-4.5cm2)  AoV Dimensionless Index: 1.05        RIGHT VENTRICLE:  RV Basal 3.30 cm  RV Mid   2.50 cm  RV Major 6.6 cm  RV s'    0.22 m/s        [id number] Eduin García MD  Electronically signed on 9/13/2024 at 11:30:20 AM        ** Final **        Physical Exam  Constitutional:       Comments: awake alert oreiented   HENT:      Head: Normocephalic and atraumatic.      Nose: Nose normal.      Mouth/Throat:      Mouth: Mucous membranes are moist.   Eyes:      Pupils: Pupils are equal, round, and reactive to light.   Cardiovascular:      Rate and Rhythm: Normal rate and regular rhythm.      Pulses: Normal pulses.   Pulmonary:      Effort: Pulmonary effort is normal.   Abdominal:      Palpations: Abdomen is soft.   Musculoskeletal:      Cervical back: Normal range of motion. Right hip suture , healing no pain   Skin:     General: Skin is dry.   Neurological:      General: No focal deficit present.      Comments: Advanced dementia            Last Recorded Vitals  /64 (BP Location: Left arm, Patient Position: Lying)   Pulse 79   Temp 36.5 °C (97.7 °F) (Temporal)   Resp 20   Wt 47.7 kg (105 lb 3.2 oz)   SpO2 97%   Intake/Output last 3 Shifts:    Intake/Output Summary (Last 24 hours) at 9/16/2024 0853  Last data filed at 9/16/2024 0523  Gross per 24 hour   Intake 3200 ml   Output 1450 ml   Net 1750 ml       Admission Weight  Weight: 48.5 kg (106 lb 14.8 oz) (09/12/24 0944)    Daily Weight  09/12/24 : 47.7 kg (105 lb 3.2 oz)    Image Results  ECG 12 lead  Sinus rhythm with Premature atrial complexes  Left axis deviation  Nonspecific ST and T wave abnormality  Abnormal ECG  When compared with ECG of 12-SEP-2024 09:52, (unconfirmed)  Premature atrial complexes are now Present  QRS axis Shifted left  T wave inversion no longer evident in Inferior leads  Nonspecific T wave abnormality now evident in Anterolateral leads  See ED provider note for full interpretation and  clinical correlation  Confirmed by Trisha Whittington ([id number]) on 9/15/2024 1:11:57 PM      Physical Exam    Relevant Results               Assessment/Plan      83-year-old female with a past medical history of dementia, osteoporosis, UTI, falls, right hip surgery rehabbing at the skilled nursing home recently presented with diarrhea dehydration and UTI.  She was hyponatremic, hypokalemic and dehydrated on admission.  She has a right lower extremity swelling and a DVT present on admission.She started Eliquis.  Troponins were elevated.  She did have a lactic acidosis but is improved.  Blood pressures improved.  Patient was initially going to be transferred to [place] but then per orthopedic evaluation patient can do outpatient follow-up for orthopedic surgery.  Patient is currently being treated for acute dehydration and UTI.     Assessment  Syncope resolved  Acute dehydration improving  Acute UTI  Acute DVT  Advanced dementia  History of recent hip surgery  Hypokalemia  Anemia  Leukocytosis  NSTEMI type II  GI bleed     Plan  Patient is tolerating diet well.  Continue current ongoing treatment  Continue IV ceftriaxone  Continue to follow blood cultures and await sensitivities  Encourage oral hydration we can stop IV fluids replete potassium  Continue Eliquis for DVT  Patient fecal occult blood is positive but her hemoglobin has been stable.  She has chronic low stable hemoglobin.  Will continue to monitor for major bleeding.  If not patient can be referred to outpatient colonoscopy  Continue current ongoing supportive and symptomatic care  Transfer to [place] has been canceled.  Patient can have outpatient orthopedic follow-up  Troponins elevated likely secondary to NSTEMI.  No intervention required.  Echo normal  Iron studies reviewed, no intervention.  Vitamin B12 adequate, TSH within normal limits  Patient impaired mentation/disorientation likely from acute dehydration from UTI.  Will continue to do  supportive and symptomatic care and discharged back to nursing facility    Awaiting susceptibilities for blood culture.    Continue home medications  DVT prophylaxis: Eliquis  GI prophylaxis: Protonix                 Jimmie Whiting MD

## 2024-09-16 NOTE — CARE PLAN
The patient's goals for the shift include      Problem: Nutrition  Goal: Oral intake greater than 50%  9/16/2024 1220 by Sahra Ortiz RN  Outcome: Progressing  9/16/2024 1219 by Sahra Ortiz RN  Outcome: Progressing  Goal: Oral intake greater 75%  9/16/2024 1220 by Sahra Ortiz RN  Outcome: Progressing  9/16/2024 1219 by Sahra Ortiz RN  Outcome: Progressing  Goal: Consume prescribed supplement  9/16/2024 1220 by Sahra Ortiz RN  Outcome: Progressing  9/16/2024 1219 by Sahra Ortiz RN  Outcome: Progressing  Goal: Adequate PO fluid intake  9/16/2024 1220 by Sahra Ortiz RN  Outcome: Progressing  9/16/2024 1219 by Sahra Ortiz RN  Outcome: Progressing  Goal: Nutrition support goals are met within 48 hrs  9/16/2024 1220 by Sahra Ortiz RN  Outcome: Progressing  9/16/2024 1219 by Sahra Ortiz RN  Outcome: Progressing  Goal: Nutrition support is meeting 75% of nutrient needs  9/16/2024 1220 by Sahra Ortiz RN  Outcome: Progressing  9/16/2024 1219 by Sahra Ortiz RN  Outcome: Progressing  Goal: Lab values WNL  9/16/2024 1220 by Sahra Ortiz RN  Outcome: Progressing  9/16/2024 1219 by Sahra Ortiz RN  Outcome: Progressing  Goal: Electrolytes WNL  9/16/2024 1220 by Sahra Ortiz RN  Outcome: Progressing  9/16/2024 1219 by Sahra Ortiz RN  Outcome: Progressing  Goal: Promote healing  9/16/2024 1220 by Sahra Ortiz RN  Outcome: Progressing  9/16/2024 1219 by Sahra Ortiz RN  Outcome: Progressing  Goal: Gradual weight gain  9/16/2024 1220 by Sahra Ortiz RN  Outcome: Progressing  9/16/2024 1219 by Sahra Ortiz RN  Outcome: Progressing     Problem: Fall/Injury  Goal: Not fall by end of shift  9/16/2024 1220 by Sahra Ortiz RN  Outcome: Progressing  9/16/2024 1219 by Sahra D Kennish, RN  Outcome: Progressing  Goal: Be free from injury by end of the shift  9/16/2024 1220 by Sahra Ortiz, RN  Outcome: Progressing  9/16/2024  1219 by Sahra Ortiz RN  Outcome: Progressing  Goal: Verbalize understanding of personal risk factors for fall in the hospital  9/16/2024 1220 by Sahra Ortiz RN  Outcome: Progressing  9/16/2024 1219 by Sahra Ortiz RN  Outcome: Progressing  Goal: Verbalize understanding of risk factor reduction measures to prevent injury from fall in the home  9/16/2024 1220 by Sahra Ortiz RN  Outcome: Progressing  9/16/2024 1219 by Sahra Ortiz RN  Outcome: Progressing  Goal: Use assistive devices by end of the shift  9/16/2024 1220 by Sahra Ortiz RN  Outcome: Progressing  9/16/2024 1219 by Sahra Ortiz RN  Outcome: Progressing  Goal: Pace activities to prevent fatigue by end of the shift  9/16/2024 1220 by Sahra Ortiz RN  Outcome: Progressing  9/16/2024 1219 by Sahra Ortiz RN  Outcome: Progressing     Problem: Pain - Adult  Goal: Verbalizes/displays adequate comfort level or baseline comfort level  9/16/2024 1220 by Sahra Ortiz RN  Outcome: Progressing  9/16/2024 1219 by Sahra Ortiz RN  Outcome: Progressing     Problem: Safety - Adult  Goal: Free from fall injury  9/16/2024 1220 by Sahra Ortiz RN  Outcome: Progressing  9/16/2024 1219 by Sahra Ortiz RN  Outcome: Progressing     Problem: Discharge Planning  Goal: Discharge to home or other facility with appropriate resources  9/16/2024 1220 by Sahra Ortiz RN  Outcome: Progressing  9/16/2024 1219 by Sahra Ortiz RN  Outcome: Progressing     Problem: Chronic Conditions and Co-morbidities  Goal: Patient's chronic conditions and co-morbidity symptoms are monitored and maintained or improved  9/16/2024 1220 by Sahra Ortiz RN  Outcome: Progressing  9/16/2024 1219 by Sahra Ortiz RN  Outcome: Progressing     Problem: Skin  Goal: Decreased wound size/increased tissue granulation at next dressing change  9/16/2024 1220 by Sahra Ortiz RN  Outcome: Progressing  9/16/2024 1219 by Sahra GRIFFITH  TAYLOR Ortiz  Outcome: Progressing  Goal: Participates in plan/prevention/treatment measures  9/16/2024 1220 by Sahra Ortiz RN  Outcome: Progressing  9/16/2024 1219 by Sahra Ortiz RN  Outcome: Progressing  Goal: Prevent/manage excess moisture  9/16/2024 1220 by Sahra Ortiz RN  Outcome: Progressing  9/16/2024 1219 by Sahra rOtiz RN  Outcome: Progressing  Goal: Prevent/minimize sheer/friction injuries  9/16/2024 1220 by Sahra Ortiz RN  Outcome: Progressing  Flowsheets (Taken 9/16/2024 1220)  Prevent/minimize sheer/friction injuries:   HOB 30 degrees or less   Turn/reposition every 2 hours/use positioning/transfer devices   Complete micro-shifts as needed if patient unable. Adjust patient position to relieve pressure points, not a full turn   Increase activity/out of bed for meals   Use pull sheet  9/16/2024 1219 by Sahra Ortiz RN  Outcome: Progressing  Goal: Promote/optimize nutrition  9/16/2024 1220 by Sahra Ortiz RN  Outcome: Progressing  9/16/2024 1219 by Sahra Ortiz RN  Outcome: Progressing  Goal: Promote skin healing  9/16/2024 1220 by Sahra Ortiz RN  Outcome: Progressing  9/16/2024 1219 by Sahra Ortiz RN  Outcome: Progressing     The clinical goals for the shift include Pt will have no falls by end of shift.    Over the shift, the patient did not make progress toward the following goals. Barriers to progression include pt's cognition. Recommendations to address these barriers include staff will continue to re-orient  the patient as needed.

## 2024-09-17 LAB
BACTERIA BLD AEROBE CULT: ABNORMAL
BACTERIA BLD CULT: ABNORMAL
GRAM STN SPEC: ABNORMAL

## 2024-09-17 PROCEDURE — 99232 SBSQ HOSP IP/OBS MODERATE 35: CPT | Performed by: STUDENT IN AN ORGANIZED HEALTH CARE EDUCATION/TRAINING PROGRAM

## 2024-09-17 PROCEDURE — 2500000001 HC RX 250 WO HCPCS SELF ADMINISTERED DRUGS (ALT 637 FOR MEDICARE OP): Performed by: HOSPITALIST

## 2024-09-17 PROCEDURE — 2500000005 HC RX 250 GENERAL PHARMACY W/O HCPCS: Performed by: HOSPITALIST

## 2024-09-17 PROCEDURE — 2500000001 HC RX 250 WO HCPCS SELF ADMINISTERED DRUGS (ALT 637 FOR MEDICARE OP): Performed by: INTERNAL MEDICINE

## 2024-09-17 PROCEDURE — 2500000004 HC RX 250 GENERAL PHARMACY W/ HCPCS (ALT 636 FOR OP/ED): Performed by: STUDENT IN AN ORGANIZED HEALTH CARE EDUCATION/TRAINING PROGRAM

## 2024-09-17 PROCEDURE — 1200000002 HC GENERAL ROOM WITH TELEMETRY DAILY

## 2024-09-17 PROCEDURE — 97110 THERAPEUTIC EXERCISES: CPT | Mod: GP,CQ

## 2024-09-17 PROCEDURE — 97530 THERAPEUTIC ACTIVITIES: CPT | Mod: GP,CQ

## 2024-09-17 PROCEDURE — 2500000001 HC RX 250 WO HCPCS SELF ADMINISTERED DRUGS (ALT 637 FOR MEDICARE OP): Performed by: STUDENT IN AN ORGANIZED HEALTH CARE EDUCATION/TRAINING PROGRAM

## 2024-09-17 PROCEDURE — 97535 SELF CARE MNGMENT TRAINING: CPT | Mod: GO

## 2024-09-17 RX ORDER — LOPERAMIDE HYDROCHLORIDE 2 MG/1
2 CAPSULE ORAL 4 TIMES DAILY PRN
Status: DISCONTINUED | OUTPATIENT
Start: 2024-09-17 | End: 2024-09-18 | Stop reason: HOSPADM

## 2024-09-17 RX ORDER — FLUCONAZOLE 150 MG/1
150 TABLET ORAL DAILY
Status: DISCONTINUED | OUTPATIENT
Start: 2024-09-17 | End: 2024-09-18 | Stop reason: HOSPADM

## 2024-09-17 ASSESSMENT — ACTIVITIES OF DAILY LIVING (ADL)
GROOMING: NEEDS ASSISTANCE
JUDGMENT_ADEQUATE_SAFELY_COMPLETE_DAILY_ACTIVITIES: NO
ASSISTIVE_DEVICE: NONE;DENTURES UPPER
ADEQUATE_TO_COMPLETE_ADL: YES
HEARING - LEFT EAR: FUNCTIONAL
PATIENT'S MEMORY ADEQUATE TO SAFELY COMPLETE DAILY ACTIVITIES?: NO
DRESSING YOURSELF: NEEDS ASSISTANCE
BATHING: NEEDS ASSISTANCE
WALKS IN HOME: INDEPENDENT
TOILETING: NEEDS ASSISTANCE
FEEDING YOURSELF: INDEPENDENT
HOME_MANAGEMENT_TIME_ENTRY: 39
HEARING - RIGHT EAR: FUNCTIONAL

## 2024-09-17 ASSESSMENT — COGNITIVE AND FUNCTIONAL STATUS - GENERAL
EATING MEALS: A LITTLE
CLIMB 3 TO 5 STEPS WITH RAILING: TOTAL
STANDING UP FROM CHAIR USING ARMS: A LOT
TURNING FROM BACK TO SIDE WHILE IN FLAT BAD: TOTAL
TOILETING: TOTAL
DRESSING REGULAR LOWER BODY CLOTHING: TOTAL
DRESSING REGULAR UPPER BODY CLOTHING: A LOT
MOVING TO AND FROM BED TO CHAIR: A LOT
HELP NEEDED FOR BATHING: TOTAL
CLIMB 3 TO 5 STEPS WITH RAILING: TOTAL
MOBILITY SCORE: 10
MOVING FROM LYING ON BACK TO SITTING ON SIDE OF FLAT BED WITH BEDRAILS: A LOT
EATING MEALS: A LITTLE
MOVING FROM LYING ON BACK TO SITTING ON SIDE OF FLAT BED WITH BEDRAILS: TOTAL
TOILETING: TOTAL
HELP NEEDED FOR BATHING: TOTAL
PERSONAL GROOMING: A LITTLE
TURNING FROM BACK TO SIDE WHILE IN FLAT BAD: A LOT
MOBILITY SCORE: 9
PATIENT BASELINE BEDBOUND: NO
DAILY ACTIVITIY SCORE: 12
STANDING UP FROM CHAIR USING ARMS: A LOT
DRESSING REGULAR LOWER BODY CLOTHING: A LOT
PERSONAL GROOMING: A LOT
WALKING IN HOSPITAL ROOM: A LOT
MOVING TO AND FROM BED TO CHAIR: A LOT
DAILY ACTIVITIY SCORE: 10
DRESSING REGULAR UPPER BODY CLOTHING: A LOT
WALKING IN HOSPITAL ROOM: TOTAL

## 2024-09-17 ASSESSMENT — PAIN SCALES - WONG BAKER
WONGBAKER_NUMERICALRESPONSE: NO HURT

## 2024-09-17 ASSESSMENT — PAIN - FUNCTIONAL ASSESSMENT: PAIN_FUNCTIONAL_ASSESSMENT: 0-10

## 2024-09-17 ASSESSMENT — PAIN SCALES - GENERAL
PAINLEVEL_OUTOF10: 0 - NO PAIN

## 2024-09-17 NOTE — PROGRESS NOTES
Physical Therapy    Physical Therapy Treatment    Patient Name: Dinah Parmar  MRN: [id number]  Today's Date: 9/17/2024  Time Calculation  Start Time: 1351  Stop Time: 1415  Time Calculation (min): 24 min     319/319-A    Assessment/Plan   PT Assessment  PT Assessment Results: Decreased strength, Decreased endurance, Impaired balance, Decreased mobility, Decreased cognition, Impaired judgement, Decreased safety awareness  Rehab Prognosis: Fair (due to dementia)  Barriers to Discharge: dementia, inability to ambulate  Evaluation/Treatment Tolerance: Patient limited by fatigue  Barriers to Participation: Comorbidities  End of Session Communication: Bedside nurse (also communication on white board)  Assessment Comment: Pt. needed verbal cues with TE, pt. moves slowly through exercises.  Assisted patient with right LE with gentle heel slides.  Trial STS x1, max assist needed with scooting in the chair.  Verbal cues needed with hand placement during transfer.  Pt. could not come into a full upright standing position, pt. kept a flexed trunk.  Pt. returned to chair with help to scoot back.  Continue with POC and progress per tolerance to improve functional mobility and transfers with greater ease and safety.  MB  End of Session Patient Position: Up in chair, Alarm on (call light within reach)  PT Plan  Inpatient/Swing Bed or Outpatient: Inpatient  PT Plan  Treatment/Interventions: Bed mobility, Transfer training, Balance training, Neuromuscular re-education, Strengthening, Endurance training, Therapeutic exercise, Therapeutic activity  PT Plan: Ongoing PT  PT Frequency: 4 times per week  PT Discharge Recommendations: Moderate intensity level of continued care  Equipment Recommended upon Discharge: Wheelchair  PT Recommended Transfer Status: Total assist  PT - OK to Discharge: Yes (once medically appropriate)    Current Problem:  Patient Active Problem List   Diagnosis    Kyphosis (acquired) (postural)    Vaginal dryness     Vascular dementia without behavioral disturbance (Multi)    Age-related osteoporosis without current pathological fracture    Urge incontinence of urine    Generalized weakness    Vitamin D deficiency    Pain of right clavicle    Fall in home    Arthralgia of hip    Acute low back pain    Syncope, unspecified syncope type       General Visit Information:   PT  Visit  PT Received On: 09/17/24  General  Family/Caregiver Present: Yes  Prior to Session Communication: Bedside nurse  Patient Position Received: Up in chair, Alarm on  Subjective     Precautions:  Precautions  LE Weight Bearing Status: Weight Bearing as Tolerated  Medical Precautions: Fall precautions    Vital Signs:     Objective     Pain:  Pain Assessment  0-10 (Numeric) Pain Score: 0 - No pain  Rankin-Baker FACES Pain Rating: No hurt    Cognition:       Postural Control:       Extremity/Trunk Assessments:                Activity Tolerance:       Treatments:  Therapeutic Exercise  Therapeutic Exercise Performed: Yes  Therapeutic Exercise Activity 1: Seated resisted DF/PF  Therapeutic Exercise Activity 2: Seated LAQ's  Therapeutic Exercise Activity 3: Seated marches  Therapeutic Exercise Activity 4: Seated L hip abduction  Therapeutic Exercise Activity 5: seated L hip adduction  Therapeutic Exercise Activity 6: heel slide  Therapeutic Exercise Activity 7: SAQ's              Transfers  Transfer: Yes  Transfer 1  Technique 1: Sit to stand, Stand to sit  Transfer Device 1: Walker, Gait belt  Transfer Level of Assistance 1: Maximum assistance          Outcome Measures:     [facility] Basic Mobility  Turning from your back to your side while in a flat bed without using bedrails: Total  Moving from lying on your back to sitting on the side of a flat bed without using bedrails: Total  Moving to and from bed to chair (including a wheelchair): A lot  Standing up from a chair using your arms (e.g. wheelchair or bedside chair): A lot  To walk in hospital room: A  lot  Climbing 3-5 steps with railing: Total  Basic Mobility - Total Score: 9                                      Education Documentation  Mobility Training, taught by Wendie Moeller PTA at 9/17/2024  2:24 PM.  Learner: Patient  Readiness: Acceptance  Method: Explanation  Response: No Evidence of Learning    Education Comments  No comments found.           EDUCATION:     Encounter Problems       Encounter Problems (Active)       PT Problem       PT Goal 1 (Progressing)       Start:  09/13/24    Expected End:  09/26/24       Dinah Parmar will perform bed mobility for supine to and from sitting EOB without use of rail with modA x1           PT Goal 2 (Progressing)       Start:  09/13/24    Expected End:  09/26/24       Dinha Parmar will transfer sit to and from stand using least restrictive assistive device modA x1           PT Goal 3 (Progressing)       Start:  09/13/24    Expected End:  09/26/24       Dinah Parmar will transfer squat pivot bed to/from chair with modA x1              Pain - Adult

## 2024-09-17 NOTE — PROGRESS NOTES
Adrian Parmar is a 83 y.o. female on day 5 of admission presenting with Syncope, unspecified syncope type.      Subjective   Patient seen and examined bedside.She is awake alert and oriented.  Does not have any new complaints.  She is getting ready to eat her breakfast      Objective         Expand All Collapse All    Adrian Parmar is a 83 y.o. female on day 3 of admission presenting with Syncope, unspecified syncope type.           Subjective     Patient seen and examined bedside.  She seems very confused.          Objective[]Expand by Default  Last Recorded Vitals  /58 (BP Location: Left arm, Patient Position: Lying)   Pulse 69   Temp 36.2 °C (97.2 °F) (Temporal)   Resp 20   Wt 47.7 kg (105 lb 3.2 oz)   SpO2 97%   Intake/Output last 3 Shifts:     Intake/Output Summary (Last 24 hours) at 9/15/2024 0837  Last data filed at 9/15/2024 0200      Gross per 24 hour   Intake 670 ml   Output 1 ml   Net 669 ml         Admission Weight  Weight: 48.5 kg (106 lb 14.8 oz) (09/12/24 0944)     Daily Weight  09/12/24 : 47.7 kg (105 lb 3.2 oz)     Image Results  Vascular US carotid artery duplex bilateral               [place] [address]  Phone [phone], Fax [phone]        Vascular Lab Report     [facility] US CAROTID ARTERY DUPLEX BILATERAL     Patient Name:      ADRIAN PARMAR            Reading Physician:  [id number] Adal Roach MD, RPVI  Study Date:        9/13/2024             Ordering Provider:  [id number] LORI BACK  MRN/PID:           [id number]              Fellow:  Accession#:        [id number]          Technologist:       Audra Dawson RVT,                                                               [facility]  Date of Birth/Age: 1940 / 83 years Technologist 2:  Gender:            F                     Encounter#:         [id number]  Admission Status:  Inpatient             Location Performed:  [facility]        Diagnosis/ICD: Other specified symptoms and signs involving the circulatory and                 respiratory systems-R09.89  CPT Codes:     08202 Cerebrovascular Carotid Duplex scan complete        CONCLUSIONS:  Right Carotid: Findings are consistent with less than 50% stenosis of the right proximal internal carotid artery. Right external carotid artery appears patent with no evidence of stenosis. The right vertebral artery is patent with antegrade flow. No evidence of hemodynamically significant stenosis in the right subclavian artery.  Left Carotid: Findings are consistent with less than 50% stenosis of the left proximal internal carotid artery. Left external carotid artery appears patent with no evidence of stenosis. The left vertebral artery is patent with antegrade flow. No evidence of hemodynamically significant stenosis in the left subclavian artery.     Imaging & Doppler Findings:  Right Plaque Morph: The proximal right internal carotid artery demonstrates heterogenous plaque.  Left Plaque Morph: The proximal left internal carotid artery demonstrates heterogenous plaque.      Right                        Left    PSV      EDV                PSV      EDV  114 cm/s 98 cm/s   CCA P    109 cm/s 4 cm/s  89 cm/s  10 cm/s   CCA D    102 cm/s 8 cm/s  111 cm/s 9 cm/s    ICA P    69 cm/s  13 cm/s  81 cm/s  8 cm/s    ICA M    104 cm/s 16 cm/s  109 cm/s 13 cm/s   ICA D    118 cm/s 24 cm/s  132 cm/s            ECA     118 cm/s  149 cm/s         Vertebral  92 cm/s  6 cm/s  156 cm/s         Subclavian 182 cm/s                      Right Left  ICA/CCA Ratio  1.2  0.7           [id number] Adal Roach MD, ESA  Electronically signed by [id number] Adal Roach MD, RPMISA on 9/13/2024 at 9:50:10 PM        ** Final **  Lower extremity venous duplex bilateral               [address] [address]  Phone  [phone] ext-[phone], Fax [phone]        Vascular Lab Report     VASC US LOWER EXTREMITY VENOUS DUPLEX BILATERAL     Patient Name:      ADRIAN Hernandes Physician:  [id number] Adal Roach MD, RPVI  Study Date:        9/13/2024             Ordering Provider:  [id number] MARTELL WHITMAN  MRN/PID:           [id number]              Fellow:  Accession#:        [id number]          Technologist:       Audra Dawson RVT,                                                               [facility]  Date of Birth/Age: 1940 / 83 years Technologist 2:  Gender:            F                     Encounter#:         [id number]  Admission Status:  Inpatient             Location Performed: [facility] [place]        Diagnosis/ICD: Localized (leg) edema-R60.0  CPT Codes:     11728 Peripheral venous duplex scan for DVT complete        **CRITICAL RESULT**  Critical Result: Positive for acute DVT of right peroneal vein and ?age of the distal right FV.  Notification called to Dr Whitman on 9/13/2024 at 4:36:57 PM by Audra Dawson RVT.     CONCLUSIONS:  Right Lower Venous: There is acute occlusive deep vein thrombosis visualized in the peroneal vein. There is age indeterminate deep vein thrombosis visualized in the distal femoral vein. Technically difficult due to the patients position.  Left Lower Venous: Negative for acute DVT of the visualized vessel. The calf veins are poorly visualized due to the patients position.     Imaging & Doppler Findings:     Right                 Compressible    Thrombus            Flow  Distal External Iliac                   None  CFV                       Yes           None       Spontaneous/Phasic  PFV                       Yes           None  FV Proximal               Yes           None       Spontaneous/Phasic  FV Mid                    Yes           None  FV Distal                Partial         ? Age  Popliteal                 Yes           None       Spontaneous/Phasic  Peroneal                   No      Acute occlusive  PTV                       Yes           None        Left                  Compress Thrombus        Flow  Distal External Iliac            None  CFV                     Yes      None   Spontaneous/Phasic  PFV                     Yes      None  FV Proximal             Yes      None   Spontaneous/Phasic  FV Mid                  Yes      None  FV Distal               Yes      None  Popliteal               Yes      None   Spontaneous/Phasic        [id number] Adal Roach MD, RPVI  Electronically signed by [id number] Adal Roach MD, RPMISA on 9/13/2024 at 9:46:19 PM        ** Final **  ECG 12 lead  Normal sinus rhythm  Cannot rule out Anterior infarct , age undetermined  Abnormal ECG  No previous ECGs available  See ED provider note for full interpretation and clinical correlation  Confirmed by Trisha Whittington ([id number]) on 9/13/2024 9:31:31 PM  Transthoracic Echo (TTE) [place] [address]  Phone [phone] [phone], Fax [phone]     TRANSTHORACIC ECHOCARDIOGRAM REPORT     Patient Name:      ADRIAN Hernandes Physician:    [id number] Eduin García MD  Study Date:        9/13/2024             Ordering Provider:    [id number] YASH SCHAEFFER  MRN/PID:           [id number]              Fellow:  Accession#:        [id number]          Nurse:  Date of Birth/Age: 1940 / 83 years Sonographer:          Drake Noe RDCS  Gender:            F                     Additional Staff:  Height:            157.48 cm             Admit Date:  Weight:            47.63 kg              Admission Status:     Inpatient  -                                                                 Routine  BSA / BMI:         1.45 m2 / 19.20 kg/m2 Department Location:  [address]  Blood Pressure: 114 /65 mmHg     Study Type:    TRANSTHORACIC ECHO (TTE) COMPLETE  Diagnosis/ICD: Unspecified systolic (congestive) heart failure (CHF)-I50.20;                 Syncope-R55  CPT Codes:     Echo Complete w Full Doppler-99886   Study Detail: The following Echo studies were performed: 2D, M-Mode, Doppler and                color flow.        PHYSICIAN INTERPRETATION:  Left Ventricle: The left ventricular systolic function is normal, with a visually estimated ejection fraction of 65-70%. There are no regional wall motion abnormalities. The left ventricular cavity size is normal. Spectral Doppler shows an impaired relaxation pattern of left ventricular diastolic filling.  Left Atrium: The left atrium is normal in size.  Right Ventricle: The right ventricle is normal in size. There is normal right ventricular global systolic function.  Right Atrium: The right atrium is normal in size.  Aortic Valve: The aortic valve is trileaflet. The aortic valve dimensionless index is 1.05. There is mild to moderate aortic valve regurgitation. The peak instantaneous gradient of the aortic valve is 14.4 mmHg. The mean gradient of the aortic valve is 7.0 mmHg.  Mitral Valve: The mitral valve is normal in structure. There is no evidence of mitral valve regurgitation.  Tricuspid Valve: The tricuspid valve is structurally normal. No evidence of tricuspid regurgitation.  Pulmonic Valve: The pulmonic valve is not well visualized. There is no indication of pulmonic valve regurgitation.  Pericardium: There is a trivial pericardial effusion.  Aorta: The aortic root is normal.  Systemic Veins: The inferior vena cava appears to be of normal size, IVC inspiratory collapse greater than 50%.        CONCLUSIONS:   1. The left ventricular systolic function is normal, with a visually  estimated ejection fraction of 65-70%.   2. Spectral Doppler shows an impaired relaxation pattern of left ventricular diastolic filling.   3. There is normal right ventricular global systolic function.   4. Mild to moderate aortic valve regurgitation.     QUANTITATIVE DATA SUMMARY:     2D MEASUREMENTS:           Normal Ranges:  Ao Root d:       3.10 cm   (2.0-3.7cm)  LAs:             3.00 cm   (2.7-4.0cm)  IVSd:            0.87 cm   (0.6-1.1cm)  LVPWd:           0.76 cm   (0.6-1.1cm)  LVIDd:           3.92 cm   (3.9-5.9cm)  LVIDs:           2.31 cm  LV Mass Index:   63.9 g/m2  LV % FS          41.1 %        LA VOLUME:                   Normal Ranges:  LA Vol A4C:        13.2 ml   (22+/-6mL/m2)  LA Vol A2C:        13.6 ml  LA Vol BP:         13.5 ml  LA Vol Index A4C:  9.1ml/m2  LA Vol Index A2C:  9.3 ml/m2  LA Vol Index BP:   9.3 ml/m2  LA Area A4C:       7.8 cm2  LA Area A2C:       7.8 cm2  LA Major Axis A4C: 4.0 cm  LA Major Axis A2C: 3.9 cm  LA Volume Index:   9.0 ml/m2  LA Vol A4C:        13.1 ml  LA Vol A2C:        13.5 ml  LA Vol Index BSA:  9.1 ml/m2        AORTA MEASUREMENTS:         Normal Ranges:  Asc Ao, d:          3.40 cm (2.1-3.4cm)        LV SYSTOLIC FUNCTION BY 2D PLANIMETRY (MOD):                       Normal Ranges:  EF-A4C View:    53 % (>=55%)  EF-A2C View:    48 %  EF-Biplane:     50 %  EF-Visual:      68 %  LV EF Reported: 68 %        LV DIASTOLIC FUNCTION:           Normal Ranges:  MV e'                  0.068 m/s (>8.0)  MV lateral e'          0.08 m/s  MV medial e'           0.06 m/s        AORTIC VALVE:                      Normal Ranges:  AoV Vmax:                1.90 m/s  (<=1.7m/s)  AoV Peak P.4 mmHg (<20mmHg)  AoV Mean P.0 mmHg  (1.7-11.5mmHg)  LVOT Max Duarte:            1.93 m/s  (<=1.1m/s)  AoV VTI:                 33.30 cm  (18-25cm)  LVOT VTI:                35.00 cm  LVOT Diameter:           1.70 cm   (1.8-2.4cm)  AoV Area, VTI:           2.39 cm2   (2.5-5.5cm2)  AoV Area,Vmax:           2.31 cm2  (2.5-4.5cm2)  AoV Dimensionless Index: 1.05        RIGHT VENTRICLE:  RV Basal 3.30 cm  RV Mid   2.50 cm  RV Major 6.6 cm  RV s'    0.22 m/s        [id number] Eduin García MD  Electronically signed on 9/13/2024 at 11:30:20 AM        ** Final **        Physical Exam  Constitutional:       Comments: awake alert oreiented   HENT:      Head: Normocephalic and atraumatic.      Nose: Nose normal.      Mouth/Throat:      Mouth: Mucous membranes are moist.   Eyes:      Pupils: Pupils are equal, round, and reactive to light.   Cardiovascular:      Rate and Rhythm: Normal rate and regular rhythm.      Pulses: Normal pulses.   Pulmonary:      Effort: Pulmonary effort is normal.   Abdominal:      Palpations: Abdomen is soft.   Musculoskeletal:      Cervical back: Normal range of motion. Right hip suture , healing no pain   Skin:     General: Skin is dry.   Neurological:      General: No focal deficit present.      Comments: Advanced dementia            Last Recorded Vitals  /70 (BP Location: Left arm, Patient Position: Lying)   Pulse 81   Temp 36 °C (96.8 °F) (Temporal)   Resp 20   Wt 47.7 kg (105 lb 3.2 oz)   SpO2 94%   Intake/Output last 3 Shifts:    Intake/Output Summary (Last 24 hours) at 9/17/2024 1456  Last data filed at 9/17/2024 1055  Gross per 24 hour   Intake 510 ml   Output 1150 ml   Net -640 ml       Admission Weight  Weight: 48.5 kg (106 lb 14.8 oz) (09/12/24 0944)    Daily Weight  09/12/24 : 47.7 kg (105 lb 3.2 oz)    Image Results  ECG 12 lead  Sinus rhythm with Premature atrial complexes  Left axis deviation  Nonspecific ST and T wave abnormality  Abnormal ECG  When compared with ECG of 12-SEP-2024 09:52, (unconfirmed)  Premature atrial complexes are now Present  QRS axis Shifted left  T wave inversion no longer evident in Inferior leads  Nonspecific T wave abnormality now evident in Anterolateral leads  See ED provider note for full interpretation and  clinical correlation  Confirmed by Trisha Whittington ([id number]) on 9/15/2024 1:11:57 PM      Physical Exam    Relevant Results             Scheduled medications  apixaban, 10 mg, oral, BID   Followed by  [START ON 9/20/2024] apixaban, 5 mg, oral, BID  ascorbic acid, 500 mg, oral, Daily  ferrous sulfate (325 mg ferrous sulfate), 65 mg of iron, oral, Daily  fluconazole, 150 mg, oral, Daily  memantine, 10 mg, oral, BID  piperacillin-tazobactam, 3.375 g, intravenous, q6h      Continuous medications     PRN medications  PRN medications: acetaminophen, ketoconazole, oxygen, traZODone  Admitted to have an admission diagnosis  Assessment/Plan      83-year-old female with a past medical history of dementia, osteoporosis, UTI, falls, right hip surgery rehabbing at the [facility] nursing [place] recently presented with diarrhea dehydration and UTI.  She was hyponatremic, hypokalemic and dehydrated on admission.  She has a right lower extremity swelling and a DVT present on admission.She started Eliquis.  Troponins were elevated.  She did have a lactic acidosis but is improved.  Blood pressures improved.  Patient was initially going to be transferred to [place] but then per orthopedic evaluation patient can do outpatient follow-up for orthopedic surgery.  Patient is currently being treated for acute dehydration and UTI.     Assessment  Syncope resolved  Acute dehydration improving  Bacteremia  Acute UTI  Candida UTI  Acute DVT  Advanced dementia  History of recent hip surgery  Hypokalemia  Anemia  Leukocytosis  NSTEMI type II  GI bleed     Plan  Continue current ongoing care  *Fluconazole for Candida and UTI  Patient is tolerating diet well.  Continue current ongoing treatment  Continue IV Zosyn for bacteremia.  Continue to follow blood cultures and await sensitivities  Encourage oral hydration we can stop IV fluids replete potassium  Continue Eliquis for DVT  Patient fecal occult blood is positive but her hemoglobin has been  stable.  She has chronic low stable hemoglobin.  Will continue to monitor for major bleeding.  If not patient can be referred to outpatient colonoscopy  Continue current ongoing supportive and symptomatic care  Transfer to [place] has been canceled.  Patient can have outpatient orthopedic follow-up  Troponins elevated likely secondary to NSTEMI.  No intervention required.  Echo normal  Iron studies reviewed, no intervention.  Vitamin B12 adequate, TSH within normal limits  Patient impaired mentation/disorientation likely from acute dehydration from UTI.  Will continue to do supportive and symptomatic care and discharged back to nursing facility    Awaiting susceptibilities for blood culture.    Continue home medications  DVT prophylaxis: Eliquis  GI prophylaxis: Protonix                 Jimmie Whiting MD

## 2024-09-17 NOTE — DISCHARGE SUMMARY
Discharge Diagnosis  Syncope, unspecified syncope type    Issues Requiring Follow-Up  83-year-old female with a past medical history of dementia, osteoporosis, UTI, falls, right hip surgery rehabbing at the skilled nursing home presented with diarrhea dehydration and UTI. She was hyponatremic, hypokalemic and dehydrated on admission.  She has a right lower extremity swelling and a DVT present on admission.She started Eliquis.  Troponins were elevated.  She did have a lactic acidosis but is improved.  Blood pressures improved.  Patient was initially going to be transferred to [place] but then per orthopedic evaluation patient can do outpatient follow-up for orthopedic surgery.  Patient is currently being treated for acute dehydration and UTI.    During the course in the hospital patient was IV resuscitated.  She was started on IV antibiotics.  Patient's mentation improved patient was initiated transfer to [facility] for hip procedure.  But there was no need for inpatient intervention for the hip in house.  Orthopedic was consulted and then it safe for the patient to follow-up as outpatient.  Patient also had sepsis with the bacteremia with bacteriodes uniformis, urine culture was also positive for Candida.  Patient source of sepsis is unclear except for the recent procedure.  She was treated with appropriate antibiotics.  A concern sensitivity resulted in Unasyn.  Also appropriate antibiotics in the hospital, she was given a midline to complete a course of antibiotics for 14 days at the rehab facility.  Patient will be returning to rehab facility today.  Patient vitals main stable during hospitalization.  Show discharge normal sinus condition.      Discharge Meds     Medication List      ASK your doctor about these medications     acetaminophen 325 mg tablet; Commonly known as: Tylenol; Ask about:   Which instructions should I use?   alendronate 70 mg tablet; Commonly known as: Fosamax; Notes to patient:    Resume home regimen   AntifungaL (miconazole) 2 % powder; Generic drug: miconazole   ascorbic acid 500 mg tablet; Commonly known as: Vitamin C   aspirin 325 mg tablet   Jass Moisture Barrier Cr 61 % cream; Generic drug: white   petrolatum   cephalexin 500 mg capsule; Commonly known as: Keflex; Take 1 capsule   (500 mg) by mouth 4 times a day for 5 days.; Ask about: Should I take this   medication?   cranberry 500 mg capsule   diphenhydrAMINE-acetaminophen  mg per tablet; Commonly known as:   Tylenol PM   ferrous sulfate (325 mg ferrous sulfate) tablet   HYDROcodone-acetaminophen 5-300 mg tablet; Commonly known as: Vicodin   ibandronate 150 mg tablet; Commonly known as: Boniva; Take 1 tablet (150   mg) by mouth every 30 (thirty) days.; Notes to patient: Continue as before   admission   ibuprofen 200 mg tablet   magnesium hydroxide 400 mg/5 mL suspension; Commonly known as: Milk of   Magnesia   * memantine 10 mg tablet; Commonly known as: Namenda   * memantine 28 mg capsule,sprinkle,ER 24hr; Commonly known as: Namenda;   Take 1 capsule (28 mg) by mouth once daily.   multivitamin tablet   vitamin E 450 mg (1000 unit) capsule  * This list has 2 medication(s) that are the same as other medications   prescribed for you. Read the directions carefully, and ask your doctor or   other care provider to review them with you.       Test Results Pending At Discharge  Pending Labs       Order Current Status    Extra Urine Gray Tube Collected (09/13/24 0013)    Urinalysis with Reflex Culture and Microscopic In process    Blood Culture Preliminary result    Blood Culture Preliminary result    Urine Culture Preliminary result            Hospital Course   83-year-old female with a past medical history of dementia, osteoporosis, UTI, falls, right hip surgery rehabbing at the skilled nursing home presented with diarrhea dehydration and UTI. She was hyponatremic, hypokalemic and dehydrated on admission.  She has a right  lower extremity swelling and a DVT present on admission.She started Eliquis.  Troponins were elevated.  She did have a lactic acidosis but is improved.  Blood pressures improved.  Patient was initially going to be transferred to [place] but then per orthopedic evaluation patient can do outpatient follow-up for orthopedic surgery.  Patient is currently being treated for acute dehydration and UTI.    During the course in the hospital patient was IV resuscitated.  She was started on IV antibiotics.  Patient's mentation improved patient was initiated transfer to [facility] for hip procedure.  But there was no need for inpatient intervention for the hip in house.  Orthopedic was consulted and then it safe for the patient to follow-up as outpatient.  Patient also had sepsis with the bacteremia with bacteriodes uniformis, urine culture was also positive for Candida.  Patient source of sepsis is unclear except for the recent procedure.  She was treated with appropriate antibiotics.  A concern sensitivity resulted in Unasyn.  Also appropriate antibiotics in the hospital, she was given a midline to complete a course of antibiotics for 14 days at the rehab facility.  Patient will be returning to rehab facility today.  Patient vitals main stable during hospitalization.  Show discharge normal sinus condition.      Pertinent Physical Exam At Time of Discharge  Physical Exam  Physical Exam  Constitutional:       Comments: awake alert oreinted  HENT:      Head: Normocephalic and atraumatic.      Nose: Nose normal.      Mouth/Throat:      Mouth: Mucous membranes are moist.   Eyes:      Pupils: Pupils are equal, round, and reactive to light.   Cardiovascular:      Rate and Rhythm: Normal rate and regular rhythm.      Pulses: Normal pulses.   Pulmonary:      Effort: Pulmonary effort is normal.   Abdominal:      Palpations: Abdomen is soft.   Musculoskeletal:      Cervical back: Normal range of motion. Right hip suture ,  healing no pain   Skin:     General: Skin is dry.   Neurological:      General: No focal deficit present.      Comments: Advanced dementia   Outpatient Follow-Up  Future Appointments   Date Time Provider Department Center   11/18/2024 10:00 AM Abdiel Gutierrez MD [id number]         Jimmie Whiting MD

## 2024-09-17 NOTE — PROGRESS NOTES
Music Therapy Note    Dinah Parmar was referred by Sahra Ortiz, RN.     Therapy Session  Referral Type: New referral this admission  Visit Type: Follow-up visit  Session Start Time: 1545  Session End Time: 1605  Intervention Delivery: In-person  Conflict of Service: None  Number of family members present: 1  Family Present for Session: Spouse/Significant Other  Family Participation: Interactive     Pre-assessment  Unable to Assess Reason: Cognitive limitation  Mood/Affect: Calm, Confused         Treatment/Interventions  Areas of Focus: Coping  Music Therapy Interventions: Live music listening  Interruption: Yes  Interrupted by: Family    Post-assessment  Unable to Assess Reason: Cognitive limitation  Mood/Affect: Calm, Confused  Continue Visiting: No  Total Session Time (min): 20 minutes    Narrative  Assessment Detail: Pt found resting in bed, awake, confused, with  at bedside, upon arrival of music therapist. Pt and pt's  agreeable to session at this time. Pt's  expressed that he used to play guitar and they both enjoy country music.  Plan: Plan to provide live music listening intervention as a means of coping.  Intervention: During intervention, pt participated by passively receiving the music and resting her eye gaze on MT and/or the wall. Pt's  received phone call during intervention and session ended.  Evaluation: Session interrupted - not able to evaluate. Pt closed eyes during phone call and MT ended session.  Follow-up: MT will follow-up as applicable.    Education Documentation  No documentation found.        Expressive Therapies Note

## 2024-09-17 NOTE — PROGRESS NOTES
"Music Therapy Note    Dinah Parmar was referred by Sahra Ortiz RN.     Therapy Session  Referral Type: New referral this admission  Visit Type: New visit  Session Start Time: 1228  Session End Time: 1230  Intervention Delivery: In-person  Conflict of Service: None  Family Present for Session: None     Pre-assessment  Unable to Assess Reason: Outcomes not assessed  Mood/Affect: Calm, Confused         Treatment/Interventions  Music Therapy Interventions: Assessment    Post-assessment  Unable to Assess Reason: Did not provide expressive therapy intervention  Mood/Affect: Calm, Confused  Total Session Time (min): 2 minutes    Narrative  Assessment Detail: Pt found resting in bed, awake, confused, upon arrival of music therapist. Pt did express feeling \"good so far\" and \"yes\" when asked if she liked music. Pt open to follow-up session later today.  Follow-up: MT will follow-up as applicable.    Education Documentation  No documentation found.        Expressive Therapies Note  "

## 2024-09-17 NOTE — PROGRESS NOTES
Per medical team, patient is medically appropriate for discharge today.  met with patient at bedside to assure patient that [facility] team is working toward patient's safe plan. Patient confused at baseline and no family at bedside; Patient expressed appreciation. SW to phone patient's .  - 1220: Patient's discharge order is complete. SW phoned patient's  to review discharge plan and Medicare IMM. Patient's  intends to return to the hospital soon, prior to patient's departure.  - 1250: Per medical team, patient's discharge order was placed when team planned for patient to transfer to another hospital. Hospitalists had not yet cancelled same, but patient is not actually appropriate for discharge until after antibiotics sensitivities have been determined. SW/DSC to send updated notes to [facility] via [employer] as available.  - 1315: SW met with patient and  at bedside to review plan and Medicare IM. Patient's  confirmed understanding and agreement with same. Plan for patient is to return to [facility] pending results of sensitivity testing. Care Transitions to follow and assist. MEGAN Aguilar

## 2024-09-17 NOTE — PROGRESS NOTES
Occupational Therapy    OT Treatment    Patient Name: Dinah Parmar  MRN: [id number]  Department: [facility]  Room: [facility]  Today's Date: 9/17/2024  Time Calculation  Start Time: 1043  Stop Time: 1122  Time Calculation (min): 39 min        Assessment:  OT Assessment: pt remains confused yet following commands with increased accuracy.  pt continues to need two person assist for any standing level ADL.   improved ability to transfer and stand during ADLs this date.  Cont with OT POC  Prognosis: Good  Barriers to Discharge: None  Evaluation/Treatment Tolerance: Patient tolerated treatment well, Patient limited by fatigue  Medical Staff Made Aware: Yes  End of Session Communication: PCT/NA/CTA, Bedside nurse  End of Session Patient Position: Alarm on, Up in chair  Prognosis: Good  Barriers to Discharge: None  Evaluation/Treatment Tolerance: Patient tolerated treatment well, Patient limited by fatigue  Medical Staff Made Aware: Yes  Plan:  Treatment Interventions: ADL retraining, Functional transfer training, UE strengthening/ROM, Endurance training, Cognitive reorientation, Patient/family training, Equipment evaluation/education, Neuromuscular reeducation  OT Frequency: 3 times per week  OT Discharge Recommendations: Moderate intensity level of continued care  OT Recommended Transfer Status: Assist of 1, Assist of 2  OT - OK to Discharge: Yes (once medically appropriate)  Treatment Interventions: ADL retraining, Functional transfer training, UE strengthening/ROM, Endurance training, Cognitive reorientation, Patient/family training, Equipment evaluation/education, Neuromuscular reeducation    Subjective   Previous Visit Info:  OT Last Visit  OT Received On: 09/17/24  General:  General  Reason for Referral: Dinah Parmar is a 83 y.o. female presenting with dehydration, diarrhea and syncope with volume depletion along with hypokalemia, hyponatremia.  Failure to thrive and dementia.  Recurrent recent falls.  She had right hip  fracture surgery (9/2/24) from unwitnessed fall.IMPRESSION:  1. There is a large mass in the region of the right hip. This extends  through the subcutaneous fat and appears to invade the musculature  around the right hip. This is only partially visualized, but measures  at least 5.4 x 2.7 x 8.0 cm.  2. The gallbladder is not clearly identified in the gallbladder fossa.  However, there is a thick-walled, somewhat tubular structure in the  right upper abdomen which may represent the gallbladder. The common  bile duct is slightly dilated which may be due to biliary obstruction.  pt plan to transfer to [facility] once a bed is available  Referred By: J Carlos  Past Medical History Relevant to Rehab: it is charted that pt had a right clavicular fx but  denies knowing of this.  pt is using RUE WFL with no c/o pain  Family/Caregiver Present: Yes ( present in room at beginning of session but leaves shortly after therapist arrival.)  Prior to Session Communication: Bedside nurse  Patient Position Received: Bed, 3 rail up, Alarm on  General Comment: pt is confused yet agreeable to session  Precautions:  Medical Precautions: Fall precautions    Vital Signs (Past 2hrs)                 Pain:  Pain Assessment  0-10 (Numeric) Pain Score: 0 - No pain  Rankin-Baker FACES Pain Rating: No hurt    Objective    Cognition:  Cognition  Overall Cognitive Status: Impaired at baseline (no new changes)  Orientation Level: Disoriented to place, Disoriented to time, Disoriented to situation  Coordination:     Activities of Daily Living: Toileting  Toileting Level of Assistance: Dependent  Where Assessed: Bedside commode  Toileting Comments: pt needs min A to maintain static stand with gait belt and no device. total assist of another for hygiene and clothing management  Functional Standing Tolerance:  Time: 2 minutes  Activity: toileting  Functional Standing Tolerance Comments: min A for static stand no device, slightly blocking  knees  Bed Mobility/Transfers: Bed Mobility  Bed Mobility: Yes  Bed Mobility 1  Bed Mobility 1: Supine to sitting  Level of Assistance 1: Maximum assistance, Dependent  Bed Mobility Comments 1: pt needs assist to get BLEs to EOB and bring trunk up to midline    Transfers  Transfer: Yes  Transfer 1  Transfer From 1: Bed to  Transfer to 1: Commode-standard  Technique 1: Squat pivot  Transfer Device 1: Gait belt  Transfer Level of Assistance 1: Maximum assistance  Trials/Comments 1: physical cues for trunk flexion.  Transfers 2  Technique 2: Sit to stand, Stand to sit  Transfer Device 2: Gait belt  Transfer Level of Assistance 2: Maximum assistance  Trials/Comments 2: max A for initial sit to stand.  pt was able to maintain static stand with min A      Functional Mobility:  Functional Mobility  Functional Mobility Performed: No      Outcome Measures:[facility] Daily Activity  Putting on and taking off regular lower body clothing: Total  Bathing (including washing, rinsing, drying): Total  Putting on and taking off regular upper body clothing: A lot  Toileting, which includes using toilet, bedpan or urinal: Total  Taking care of personal grooming such as brushing teeth: A lot  Eating Meals: A little  Daily Activity - Total Score: 10        Education Documentation  Precautions, taught by Lindsay Melchor OT at 9/17/2024 11:52 AM.  Learner: Patient  Readiness: Eager  Method: Explanation, Demonstration  Response: Needs Reinforcement, Demonstrated Understanding  Comment: technique and safety during ADL    Body Mechanics, taught by Lindsay Melchor OT at 9/17/2024 11:52 AM.  Learner: Patient  Readiness: Eager  Method: Explanation, Demonstration  Response: Needs Reinforcement, Demonstrated Understanding  Comment: technique and safety during ADL    ADL Training, taught by Lindsay Melchor OT at 9/17/2024 11:52 AM.  Learner: Patient  Readiness: Eager  Method: Explanation, Demonstration  Response: Needs Reinforcement, Demonstrated  Understanding  Comment: technique and safety during ADL    Education Comments  No comments found.        OP EDUCATION:       Goals:  Encounter Problems       Encounter Problems (Active)       ADLs       Patient will perform UB bathing with minimal assist  level of assistance. (Progressing)       Start:  09/13/24    Expected End:  09/27/24               BALANCE       Pt will maintain dynamic sitting balance during ADL task with stand by assist level of assistance in order to demonstrate decreased risk of falling and improved postural control. (Progressing)       Start:  09/13/24    Expected End:  09/27/24               TRANSFERS       Patient will perform bed mobility minimal assist  level of assistance  (Progressing)       Start:  09/13/24    Expected End:  09/27/24            Patient will complete functional transfers with moderate assist level of assistance. (Progressing)       Start:  09/13/24    Expected End:  09/27/24

## 2024-09-18 VITALS
HEART RATE: 72 BPM | HEIGHT: 63 IN | WEIGHT: 105.2 LBS | BODY MASS INDEX: 18.64 KG/M2 | OXYGEN SATURATION: 95 % | RESPIRATION RATE: 18 BRPM | SYSTOLIC BLOOD PRESSURE: 122 MMHG | TEMPERATURE: 97.5 F | DIASTOLIC BLOOD PRESSURE: 71 MMHG

## 2024-09-18 LAB
ANION GAP SERPL CALC-SCNC: 9 MMOL/L (ref 10–20)
BUN SERPL-MCNC: 3 MG/DL (ref 6–23)
CALCIUM SERPL-MCNC: 8.1 MG/DL (ref 8.6–10.3)
CHLORIDE SERPL-SCNC: 104 MMOL/L (ref 98–107)
CO2 SERPL-SCNC: 26 MMOL/L (ref 21–32)
CREAT SERPL-MCNC: 0.39 MG/DL (ref 0.5–1.05)
EGFRCR SERPLBLD CKD-EPI 2021: >90 ML/MIN/1.73M*2
ERYTHROCYTE [DISTWIDTH] IN BLOOD BY AUTOMATED COUNT: 15.1 % (ref 11.5–14.5)
GLUCOSE SERPL-MCNC: 90 MG/DL (ref 74–99)
HCT VFR BLD AUTO: 26 % (ref 36–46)
HGB BLD-MCNC: 8.4 G/DL (ref 12–16)
MCH RBC QN AUTO: 31.6 PG (ref 26–34)
MCHC RBC AUTO-ENTMCNC: 32.3 G/DL (ref 32–36)
MCV RBC AUTO: 98 FL (ref 80–100)
NRBC BLD-RTO: 0 /100 WBCS (ref 0–0)
PLATELET # BLD AUTO: 443 X10*3/UL (ref 150–450)
POTASSIUM SERPL-SCNC: 3 MMOL/L (ref 3.5–5.3)
RBC # BLD AUTO: 2.66 X10*6/UL (ref 4–5.2)
SARS-COV-2 RNA RESP QL NAA+PROBE: NOT DETECTED
SODIUM SERPL-SCNC: 136 MMOL/L (ref 136–145)
WBC # BLD AUTO: 7.6 X10*3/UL (ref 4.4–11.3)

## 2024-09-18 PROCEDURE — 99239 HOSP IP/OBS DSCHRG MGMT >30: CPT | Performed by: STUDENT IN AN ORGANIZED HEALTH CARE EDUCATION/TRAINING PROGRAM

## 2024-09-18 PROCEDURE — 36569 INSJ PICC 5 YR+ W/O IMAGING: CPT

## 2024-09-18 PROCEDURE — 36415 COLL VENOUS BLD VENIPUNCTURE: CPT | Performed by: STUDENT IN AN ORGANIZED HEALTH CARE EDUCATION/TRAINING PROGRAM

## 2024-09-18 PROCEDURE — 85027 COMPLETE CBC AUTOMATED: CPT | Performed by: STUDENT IN AN ORGANIZED HEALTH CARE EDUCATION/TRAINING PROGRAM

## 2024-09-18 PROCEDURE — 2500000004 HC RX 250 GENERAL PHARMACY W/ HCPCS (ALT 636 FOR OP/ED): Performed by: STUDENT IN AN ORGANIZED HEALTH CARE EDUCATION/TRAINING PROGRAM

## 2024-09-18 PROCEDURE — 2500000001 HC RX 250 WO HCPCS SELF ADMINISTERED DRUGS (ALT 637 FOR MEDICARE OP): Performed by: STUDENT IN AN ORGANIZED HEALTH CARE EDUCATION/TRAINING PROGRAM

## 2024-09-18 PROCEDURE — 05H333Z INSERTION OF INFUSION DEVICE INTO RIGHT INNOMINATE VEIN, PERCUTANEOUS APPROACH: ICD-10-PCS | Performed by: STUDENT IN AN ORGANIZED HEALTH CARE EDUCATION/TRAINING PROGRAM

## 2024-09-18 PROCEDURE — 87635 SARS-COV-2 COVID-19 AMP PRB: CPT | Performed by: STUDENT IN AN ORGANIZED HEALTH CARE EDUCATION/TRAINING PROGRAM

## 2024-09-18 PROCEDURE — 2500000001 HC RX 250 WO HCPCS SELF ADMINISTERED DRUGS (ALT 637 FOR MEDICARE OP): Performed by: HOSPITALIST

## 2024-09-18 PROCEDURE — 80048 BASIC METABOLIC PNL TOTAL CA: CPT | Performed by: STUDENT IN AN ORGANIZED HEALTH CARE EDUCATION/TRAINING PROGRAM

## 2024-09-18 PROCEDURE — 97535 SELF CARE MNGMENT TRAINING: CPT | Mod: GO

## 2024-09-18 RX ORDER — KETOCONAZOLE 20 MG/G
CREAM TOPICAL DAILY PRN
Qty: 100 G | Refills: 0 | Status: SHIPPED | OUTPATIENT
Start: 2024-09-18 | End: 2024-10-03

## 2024-09-18 RX ORDER — FLUCONAZOLE 150 MG/1
150 TABLET ORAL DAILY
Qty: 10 TABLET | Refills: 0 | Status: SHIPPED | OUTPATIENT
Start: 2024-09-19 | End: 2024-09-29

## 2024-09-18 RX ORDER — POTASSIUM CHLORIDE 1.5 G/1.58G
40 POWDER, FOR SOLUTION ORAL ONCE
Status: COMPLETED | OUTPATIENT
Start: 2024-09-18 | End: 2024-09-18

## 2024-09-18 RX ORDER — POTASSIUM CHLORIDE 14.9 MG/ML
20 INJECTION INTRAVENOUS
Status: DISCONTINUED | OUTPATIENT
Start: 2024-09-18 | End: 2024-09-18

## 2024-09-18 RX ORDER — LIDOCAINE HYDROCHLORIDE 10 MG/ML
5 INJECTION, SOLUTION EPIDURAL; INFILTRATION; INTRACAUDAL; PERINEURAL ONCE
Status: DISCONTINUED | OUTPATIENT
Start: 2024-09-18 | End: 2024-09-18 | Stop reason: HOSPADM

## 2024-09-18 ASSESSMENT — COGNITIVE AND FUNCTIONAL STATUS - GENERAL
TOILETING: A LOT
HELP NEEDED FOR BATHING: A LOT
DRESSING REGULAR LOWER BODY CLOTHING: TOTAL
PERSONAL GROOMING: A LITTLE
HELP NEEDED FOR BATHING: TOTAL
DAILY ACTIVITIY SCORE: 10
DAILY ACTIVITIY SCORE: 16
EATING MEALS: A LITTLE
MOVING TO AND FROM BED TO CHAIR: A LOT
DRESSING REGULAR UPPER BODY CLOTHING: A LITTLE
MOVING FROM LYING ON BACK TO SITTING ON SIDE OF FLAT BED WITH BEDRAILS: A LITTLE
TURNING FROM BACK TO SIDE WHILE IN FLAT BAD: A LOT
MOBILITY SCORE: 13
DRESSING REGULAR UPPER BODY CLOTHING: A LOT
CLIMB 3 TO 5 STEPS WITH RAILING: TOTAL
STANDING UP FROM CHAIR USING ARMS: A LITTLE
TOILETING: TOTAL
WALKING IN HOSPITAL ROOM: A LOT
DRESSING REGULAR LOWER BODY CLOTHING: A LOT
PERSONAL GROOMING: A LOT

## 2024-09-18 ASSESSMENT — PAIN - FUNCTIONAL ASSESSMENT: PAIN_FUNCTIONAL_ASSESSMENT: 0-10

## 2024-09-18 ASSESSMENT — ACTIVITIES OF DAILY LIVING (ADL)
BATHING_WHERE_ASSESSED: BED LEVEL
HOME_MANAGEMENT_TIME_ENTRY: 38
BATHING_LEVEL_OF_ASSISTANCE: MAXIMUM ASSISTANCE

## 2024-09-18 ASSESSMENT — PAIN SCALES - GENERAL: PAINLEVEL_OUTOF10: 0 - NO PAIN

## 2024-09-18 NOTE — PROGRESS NOTES
Per medical team, patient may be medically appropriate for discharge today, pending placement of Midline so that patient may receive 4 doses daily of IV Unasyn/3g.  to meet with patient and  at bedside to review discharge plan; Medicare IM reviewed yesterday. SW/DSC to continue to send updated notes and final orders to [facility] via [employer] as completed.  - 1405: Final orders completed and sent to [facility]. Nursing confirmed transport for 1415. Facility and patient's  updated. Plan is for patient to discharge to [facility] at 1415 today. No further Care Transitions needs foreseen. Care Transitions available upon request.  MEGAN Aguilar

## 2024-09-18 NOTE — NURSING NOTE
Discharge Note: 9/18/2024 1540 Discharged via stretcher by [facility] to SNF, paperwork packet sent with transporter, personal belongings taken by transporter, no distress noted, no complaints voiced. Ad VAZQUEZ

## 2024-09-18 NOTE — CARE PLAN
The patient's goals for the shift include  rest comfortably while in bed     The clinical goals for the shift include no falls     Pt. In bed this shift and turned and repositioned for comfort.  Respirations even and unlabored, denies any pain .  Alert and confused.  Bed alarm in place and functioning.  Call light within reach, will continue to monitor.   Problem: Nutrition  Goal: Oral intake greater than 50%  Outcome: Progressing  Goal: Consume prescribed supplement  Outcome: Progressing  Goal: Adequate PO fluid intake  Outcome: Progressing  Goal: Lab values WNL  Outcome: Progressing  Goal: Electrolytes WNL  Outcome: Progressing  Goal: Promote healing  Outcome: Progressing  Goal: Gradual weight gain  Outcome: Progressing     Problem: Fall/Injury  Goal: Not fall by end of shift  Outcome: Progressing  Goal: Be free from injury by end of the shift  Outcome: Progressing  Goal: Verbalize understanding of personal risk factors for fall in the hospital  Outcome: Progressing  Goal: Verbalize understanding of risk factor reduction measures to prevent injury from fall in the home  Outcome: Progressing  Goal: Use assistive devices by end of the shift  Outcome: Progressing  Goal: Pace activities to prevent fatigue by end of the shift  Outcome: Progressing     Problem: Pain - Adult  Goal: Verbalizes/displays adequate comfort level or baseline comfort level  Outcome: Progressing     Problem: Safety - Adult  Goal: Free from fall injury  Outcome: Progressing     Problem: Discharge Planning  Goal: Discharge to home or other facility with appropriate resources  Outcome: Progressing     Problem: Chronic Conditions and Co-morbidities  Goal: Patient's chronic conditions and co-morbidity symptoms are monitored and maintained or improved  Outcome: Progressing     Problem: Skin  Goal: Decreased wound size/increased tissue granulation at next dressing change  Outcome: Progressing  Goal: Participates in plan/prevention/treatment  measures  Outcome: Progressing  Goal: Prevent/manage excess moisture  Outcome: Progressing  Goal: Prevent/minimize sheer/friction injuries  Outcome: Progressing  Goal: Promote/optimize nutrition  Outcome: Progressing  Goal: Promote skin healing  Outcome: Progressing

## 2024-09-18 NOTE — CONSULTS
"Nutrition Follow Up Note  Patient has Malnutrition Diagnosis: Yes  Diagnosis Status: Ongoing  Malnutrition Diagnosis: Mild malnutrition related to starvation  As Evidenced by: sudden poor PO intake prior to admission, <50% for >3 days, mild loss of muscle and subcutaneous fat  Nutrition Assessment         9/18 Pt still with poor PO intakes, eating a bit at every meal. Pt very confused when asking questions. Rec; supplementation to help with her poor PO intakes.     Ensure plus HP 1x daily at breakfast providing 350 kcals and 20g of protein and magic cup 1x daily at dinner providing an additional 290 kcals and 9g of protein.     Nutrition History:  Food and Nutrient History:  (Pt eating at every meal just not a majority of the meal. supplements to be added in place.)  Energy Intake: Poor < 50 %  Allergies   Allergen Reactions    Donepezil Unknown      GI Symptoms: None  Oral Problems: None     Anthropometrics:  Height: 160 cm (5' 3\")   Weight: 47.7 kg (105 lb 3.2 oz)   BMI (Calculated): 18.64       Weight History:     Weight         9/12/2024  0944 9/12/2024  1500          Weight: 48.5 kg (106 lb 14.8 oz) 47.7 kg (105 lb 3.2 oz)              Nutrition Significant Labs:    Results from last 7 days   Lab Units 09/18/24  0531 09/16/24  0755 09/15/24  0845 09/14/24  0449 09/12/24  1451 09/12/24  0948   GLUCOSE mg/dL 90 79 93   < >  --  186*   SODIUM mmol/L 136 138 134*   < >  --  132*   POTASSIUM mmol/L 3.0* 3.3* 2.8*   < >  --  3.3*   CHLORIDE mmol/L 104 109* 106   < >  --  103   CO2 mmol/L 26 22 20*   < >  --  22   BUN mg/dL 3* 3* 4*   < >  --  20   CREATININE mg/dL 0.39* 0.38* 0.39*   < >  --  0.84   EGFR mL/min/1.73m*2 >90 >90 >90   < >  --  68   CALCIUM mg/dL 8.1* 8.2* 7.9*   < >  --  8.2*   PHOSPHORUS mg/dL  --   --   --   --  3.3  --    MAGNESIUM mg/dL  --   --   --   --   --  2.01    < > = values in this interval not displayed.     No results found for: \"HGBA1C\"  Results from last 7 days   Lab Units " 09/12/24  0950   POCT GLUCOSE mg/dL 165*     Lab Results   Component Value Date    ALBUMIN 2.7 (L) 09/12/2024      Lab Results   Component Value Date    CRP 2.05 (H) 09/12/2024         Nutrition Specific Medications:   Scheduled medications:  apixaban, 10 mg, oral, BID   Followed by  [START ON 9/20/2024] apixaban, 5 mg, oral, BID  ascorbic acid, 500 mg, oral, Daily  ferrous sulfate (325 mg ferrous sulfate), 65 mg of iron, oral, Daily  fluconazole, 150 mg, oral, Daily  memantine, 10 mg, oral, BID  piperacillin-tazobactam, 3.375 g, intravenous, q6h      Continuous medications:     PRN medications:  PRN medications: acetaminophen, ketoconazole, loperamide, traZODone     Nursing Data Per flowsheet:   Stool Appearance: Watery (09/17/24 1830)  Gastrointestinal  Gastrointestinal (WDL): Within Defined Limits  Abdomen Inspection: Soft, Rounded  Abdominal Tenderness: Soft, No rebound  Bowel Sounds: All quadrants  Bowel Sounds (All Quadrants): Active  Passing Flatus: Yes  Last BM Date: 09/17/24  Bowel Incontinence: Yes  Stool Appearance: Watery  Gastrointestinal Symptoms: Diarrhea  Feeding assistance level: Independent after set-up    Intake/Output Summary (Last 24 hours) at 9/18/2024 1046  Last data filed at 9/18/2024 0958  Gross per 24 hour   Intake 1470 ml   Output --   Net 1470 ml      0-10 (Numeric) Pain Score: 0 - No pain  Rankin-Baker FACES Pain Rating: No hurt   Dietary Orders (From admission, onward)       Start     Ordered    09/15/24 0840  Adult diet Regular; Soft and bite sized 6  Diet effective now        Question Answer Comment   Diet type Regular    Texture Soft and bite sized 6        09/15/24 0839                     Estimated Needs:   Total Energy Estimated Needs (kCal): 1500 kCal  Method for Estimating Needs: 25-30 kcals/kg IBW (8435-2853)  Total Protein Estimated Needs (g): 52 g  Method for Estimating Needs: 1g/kg IBW  Total Fluid Estimated Needs (mL): 1500 mL  Method for Estimating Needs: 1 ml/kcal        Nutrition Diagnosis   Malnutrition Diagnosis  Patient has Malnutrition Diagnosis: Yes  Diagnosis Status: Ongoing  Malnutrition Diagnosis: Mild malnutrition related to starvation  As Evidenced by: sudden poor PO intake prior to admission, <50% for >3 days, mild loss of muscle and subcutaneous fat      Nutrition Interventions/Recommendations   Nutrition Prescription:   (Individual nutrition prescription of 1500 kcals and 52g of protein to be provided with diet order once diet is advanced per MD.)    Nutrition Interventions:   Interventions: Medical food supplement  Meals and Snacks:  (Follow diet order once diet is advanced per MD.)  Medical Food Supplement: Commercial food, Commercial beverage  Goal:  (Magic cup 1 daily and ensure plus HP 1x daily.)      Recommendations:  Continue to monitor PO intakes and wt status  Supplementation of ensure plus HP 1x daily providing an additional 350 kcals and 20g of protein  Supplementation of magic cup 1x daily providing an additional 290 kcals and 9g of protein          Nutrition Monitoring and Evaluation   Food/Nutrient Related History Monitoring  Monitoring and Evaluation Plan: Energy intake  Energy Intake: Estimated energy intake  Criteria: Goal of PO intakes once diet is advanced, goal of >50% of meals once diet is advanced.    Body Composition/Growth/Weight History  Monitoring and Evaluation Plan: Weight  Weight: Weight change  Criteria: Continue to monitor wt status for wt changes.    Time Spent (min): 30 minutes

## 2024-09-18 NOTE — PROGRESS NOTES
"Music Therapy Note    Dinah Parmar was referred by Sahra Ortiz, TAYLOR.     Therapy Session  Referral Type: New referral this admission  Visit Type: Follow-up visit  Session Start Time: 1550  Session End Time: 1551  Intervention Delivery: In-person  Conflict of Service: None  Family Present for Session: None     Pre-assessment  Unable to Assess Reason: Outcomes not assessed  Mood/Affect: Calm         Treatment/Interventions  Music Therapy Interventions: Assessment    Post-assessment  Unable to Assess Reason: Did not provide expressive therapy intervention  Mood/Affect: Calm, Confused  Total Session Time (min): 1 minutes    Narrative  Assessment Detail: Pt found resting in recliner, awake, upon arrival of music therapist. Pt expressed \"that was pretty I enjoyed that\" from yesterday's music therapy session. Pt open to session today.  Follow-up: MT will follow-up this afternoon.    Education Documentation  No documentation found.        Expressive Therapies Note  "

## 2024-09-18 NOTE — PROGRESS NOTES
Occupational Therapy    OT Treatment    Patient Name: Dinah Parmar  MRN: [id number]  Department: [facility]  Room: [facility]319-  Today's Date: 9/18/2024  Time Calculation  Start Time: 0724  Stop Time: 0802  Time Calculation (min): 38 min        Assessment:  OT Assessment: pt continues to need extensive assist for all ADLs and transfers.   pt limited by cognition which impedes carry over of skills learned in therapy.  Cont with skilled OT services and POC  Prognosis: Good  Barriers to Discharge: None  Evaluation/Treatment Tolerance: Patient tolerated treatment well, Patient limited by fatigue  Medical Staff Made Aware: Yes  End of Session Communication: PCT/NA/CTA, Bedside nurse  End of Session Patient Position: Alarm on, Up in chair  Prognosis: Good  Barriers to Discharge: None  Evaluation/Treatment Tolerance: Patient tolerated treatment well, Patient limited by fatigue  Medical Staff Made Aware: Yes  Plan:  Treatment Interventions: ADL retraining, Functional transfer training, UE strengthening/ROM, Endurance training, Cognitive reorientation, Patient/family training, Equipment evaluation/education, Neuromuscular reeducation  OT Frequency: 3 times per week  OT Discharge Recommendations: Moderate intensity level of continued care  OT Recommended Transfer Status: Assist of 1, Assist of 2  OT - OK to Discharge: Yes (once medically stable)  Treatment Interventions: ADL retraining, Functional transfer training, UE strengthening/ROM, Endurance training, Cognitive reorientation, Patient/family training, Equipment evaluation/education, Neuromuscular reeducation    Subjective   Previous Visit Info:  OT Last Visit  OT Received On: 09/18/24  General:  General  Reason for Referral: Dinah Parmar is a 83 y.o. female presenting with dehydration, diarrhea and syncope with volume depletion along with hypokalemia, hyponatremia.  Failure to thrive and dementia.  Recurrent recent falls.  She had right hip fracture surgery (9/2/24) from unwitnessed  fall.IMPRESSION:  1. There is a large mass in the region of the right hip. This extends  through the subcutaneous fat and appears to invade the musculature  around the right hip. This is only partially visualized, but measures  at least 5.4 x 2.7 x 8.0 cm.  2. The gallbladder is not clearly identified in the gallbladder fossa.  However, there is a thick-walled, somewhat tubular structure in the  right upper abdomen which may represent the gallbladder. The common  bile duct is slightly dilated which may be due to biliary obstruction.  pt plan to [facility] to [facility] once a bed is available  Referred By: J Carlos  Past Medical History Relevant to Rehab: it is charted that pt had a right clavicular fx but  denies knowing of this.  pt is using RUE WFL with no c/o pain  Family/Caregiver Present: No  Prior to Session Communication: Bedside nurse  Patient Position Received: Bed, 3 rail up, Alarm on  General Comment: pt awake and wanting to get out of bed  Precautions:  LE Weight Bearing Status: Weight Bearing as Tolerated  Medical Precautions: Fall precautions    Vital Signs (Past 2hrs)        Date/Time Vitals Session Patient Position Pulse Resp SpO2 BP MAP (mmHg)    09/18/24 0700 --  --  72  18  95 %  122/71  88                         Pain:  Pain Assessment  Pain Assessment: 0-10  0-10 (Numeric) Pain Score: 0 - No pain  Rnakin-Baker FACES Pain Rating: No hurt    Objective    Cognition:  Cognition  Overall Cognitive Status: Impaired at baseline (no change)  Orientation Level: Disoriented to place, Disoriented to time, Disoriented to situation  Coordination:     Activities of Daily Living:      LE Bathing  LE Bathing Level of Assistance: Maximum assistance  LE Bathing Where Assessed: Bed level  LE Bathing Comments: pt soiled upon arrival  max A for all aspects of hygiene    LE Dressing  LE Dressing: Yes  Adult Briefs Level of Assistance: Dependent  LE Dressing Where Assessed: Bed level  LE Dressing Comments: rolling  R/L with mod A, exta time, and max cues    Toileting  Toileting Level of Assistance: Dependent  Where Assessed: Bed level  Toileting Comments: pt needs min A to maintain static stand with gait belt and no device. total assist of another for hygiene and clothing management  Functional Standing Tolerance:  Time: 2 minutes  Activity: toileting  Functional Standing Tolerance Comments: min A for static stand no device, slightly blocking knees  Bed Mobility/Transfers: Bed Mobility  Bed Mobility: Yes  Bed Mobility 1  Bed Mobility 1: Supine to sitting  Level of Assistance 1: Maximum assistance, Dependent  Bed Mobility Comments 1: pt needs assist to get BLEs to EOB and bring trunk up to midline    Transfers  Transfer: Yes  Transfer 1  Transfer From 1: Bed to  Transfer to 1: Chair with arms  Technique 1: Squat pivot  Transfer Device 1: Gait belt  Transfer Level of Assistance 1: Maximum assistance  Trials/Comments 1: squat pivot from high to low.  blocking knees  Transfers 2  Technique 2: Sit to stand, Stand to sit  Transfer Device 2: Gait belt  Transfer Level of Assistance 2: Maximum assistance  Trials/Comments 2: max A for initial sit to stand.  pt was able to maintain static stand with min A      Outcome Measures:[facility] Daily Activity  Putting on and taking off regular lower body clothing: Total  Bathing (including washing, rinsing, drying): Total  Putting on and taking off regular upper body clothing: A lot  Toileting, which includes using toilet, bedpan or urinal: Total  Taking care of personal grooming such as brushing teeth: A lot  Eating Meals: A little  Daily Activity - Total Score: 10        Education Documentation  Precautions, taught by Lindsay Melchor OT at 9/18/2024  8:46 AM.  Learner: Patient  Readiness: Eager  Method: Explanation, Demonstration  Response: Needs Reinforcement, No Evidence of Learning  Comment: safety and technique for safe transfers    Body Mechanics, taught by Lindsay Melchor OT at 9/18/2024  8:46  AM.  Learner: Patient  Readiness: Eager  Method: Explanation, Demonstration  Response: Needs Reinforcement, No Evidence of Learning  Comment: safety and technique for safe transfers    ADL Training, taught by Lindsay Melchor OT at 9/18/2024  8:46 AM.  Learner: Patient  Readiness: Eager  Method: Explanation, Demonstration  Response: Needs Reinforcement, No Evidence of Learning  Comment: safety and technique for safe transfers    Precautions, taught by Lindsay Melchor OT at 9/17/2024 11:52 AM.  Learner: Patient  Readiness: Eager  Method: Explanation, Demonstration  Response: Needs Reinforcement, Demonstrated Understanding  Comment: technique and safety during ADL    Body Mechanics, taught by Lindsay Melchor OT at 9/17/2024 11:52 AM.  Learner: Patient  Readiness: Eager  Method: Explanation, Demonstration  Response: Needs Reinforcement, Demonstrated Understanding  Comment: technique and safety during ADL    ADL Training, taught by Lindsay Melchor OT at 9/17/2024 11:52 AM.  Learner: Patient  Readiness: Eager  Method: Explanation, Demonstration  Response: Needs Reinforcement, Demonstrated Understanding  Comment: technique and safety during ADL    Education Comments  No comments found.        OP EDUCATION:       Goals:  Encounter Problems       Encounter Problems (Active)       ADLs       Patient will perform UB bathing with minimal assist  level of assistance. (Not Progressing)       Start:  09/13/24    Expected End:  09/27/24         Goal Note       Pt limited by cognition                 BALANCE       Pt will maintain dynamic sitting balance during ADL task with stand by assist level of assistance in order to demonstrate decreased risk of falling and improved postural control. (Not Progressing)       Start:  09/13/24    Expected End:  09/27/24               TRANSFERS       Patient will perform bed mobility minimal assist  level of assistance  (Not Progressing)       Start:  09/13/24    Expected End:  09/27/24            Patient  will complete functional transfers with moderate assist level of assistance. (Not Progressing)       Start:  09/13/24    Expected End:  09/27/24

## 2024-09-18 NOTE — PROCEDURES
Midline Line Insertion Procedure Note    Procedure: Insertion of #4 FR/18G Midline    Indications:  Home IV therapy    Procedure Details   Informed consent was obtained for the procedure, including sedation.  Risks of lung perforation, hemorrhage, and adverse drug reaction were discussed.     Maximum sterile technique was used including cap, gloves, hand hygiene, and mask.      #4 FR/18G Midline inserted to the R Basilic vein per hospital protocol.   Blood return:  yes    Findings:  Catheter inserted to 16 cm, with 0 cm. Exposed. Mid upper arm circumference is 22 cm.  There were no changes to vital signs. Catheter was flushed with 20 cc NS. Patient did tolerate procedure well.    Recommendations:  Midline  Brochure given to patient with teaching instruction.

## 2024-09-18 NOTE — PROGRESS NOTES
"Music Therapy Note    Dinah Parmar was referred by Sahra Ortiz, TAYLOR.     Therapy Session  Referral Type: New referral this admission  Visit Type: Follow-up visit  Session Start Time: 1600  Session End Time: 1600  Intervention Delivery: In-person  Conflict of Service: None  Family Present for Session: None     Pre-assessment  Unable to Assess Reason: Outcomes not assessed  Mood/Affect: Calm         Treatment/Interventions  Music Therapy Interventions: Assessment    Post-assessment  Unable to Assess Reason: Did not provide expressive therapy intervention  Mood/Affect: Calm, Confused  Total Session Time (min): 0 minutes    Narrative  Assessment Detail: Pt expressed she had to \"poop\" upon arrival of music therapist. MT notified RN.  Follow-up: MT will follow-up as applicable.    Education Documentation  No documentation found.        Expressive Therapies Note  "

## 2024-09-19 LAB
BACTERIA BLD CULT: NORMAL
BACTERIA UR CULT: ABNORMAL

## 2024-09-23 NOTE — DOCUMENTATION CLARIFICATION NOTE
"    PATIENT:               ADRIAN GREER  ACCT #:                  [id number]  MRN:                       [id number]  :                       1940  ADMIT DATE:       2024 9:27 AM  DISCH DATE:        2024 3:40 PM  RESPONDING PROVIDER #:        [id number]          PROVIDER RESPONSE TEXT:    Non-ischemic myocardial injury    CDI QUERY TEXT:    Clarification    Instruction:    Based on your assessment of the patient and the clinical information, please provide the requested documentation by clicking on the appropriate radio button and enter any additional information if prompted.    Question: Please further clarify the diagnosis of NSTEMI type II as    When answering this query, please exercise your independent professional judgment. The fact that a question is being asked, does not imply that any particular answer is desired or expected.    The patient's clinical indicators include:  Clinical Information: 83-year-old white female presented to the ED with complaint of being unresponsive with low blood pressure.    Documented Diagnosis: H and P notes \"Presented with Diarrhea dehydration, possible viral illness - Hyponatremia - Hypokalemia  Volume depletion\", \"denies chest pain\".  Cardiology consult 9/15 notes \"Trop - - EKG shows normal sinus rhythm with PACs and no signs of acute ischemic changes\"  and progress notes - 9/15 and   notes \"NSTEMI type II\"      Clinical Indicators and Documentation:  -Vital Signs:  - HR 99-BP 86/49 sat 99 percent on RA  -Troponin trend: 24-  -EKG findings: 24-documented per Cardiology consult \"EKG shows normal sinus rhythm with PACs and no signs of acute ischemic changes\".  -ECHO findings: 24 results per cardiology \"Echocardiogram (2024):  1. The left ventricular systolic function is normal, with a visually estimated ejection fraction of 65-70%.  2. Spectral Doppler shows an impaired relaxation pattern of left ventricular diastolic " filling.  3. There is normal right ventricular global systolic function.  4. Mild to moderate aortic valve regurgitation.  -Physical Exam or Documented/Presenting symptoms: Presented with falls and syncope  -Cardiac Interventions: ECG and ECHO with Cardiology consult  -Cardiac Consult: Syncope- unresponsiveness, no signs of acute ischemic changes- do ECHO keep metoprolol and follow with 7 day holter upon discharge    Treatment: serial troponin x2 management of ow BP IV fluids, metoprolol maintained and cardiology consult    Risk Factors:  Options provided:  -- NSTEMI as evidenced by, Please specify additional information below  -- Type II MI as evidenced by, Please specify additional information below  -- Demand ischemia without progression to Myocardial  Injury  -- Non-ischemic myocardial injury  -- Other - I will add my own diagnosis  -- Refer to Clinical Documentation Reviewer    Query created by: Vannesa Perez on 9/17/2024 1:11 PM      Electronically signed by:  MARTELL MONTEZ MD 9/23/2024 8:24 AM

## 2024-09-25 ENCOUNTER — LAB REQUISITION (OUTPATIENT)
Dept: LAB | Facility: HOSPITAL | Age: 84
End: 2024-09-25
Payer: MEDICARE

## 2024-09-25 DIAGNOSIS — R41.0 DISORIENTATION, UNSPECIFIED: ICD-10-CM

## 2024-09-25 LAB
AMORPH CRY #/AREA UR COMP ASSIST: ABNORMAL /HPF
APPEARANCE UR: ABNORMAL
BACTERIA #/AREA URNS AUTO: ABNORMAL /HPF
BILIRUB UR STRIP.AUTO-MCNC: NEGATIVE MG/DL
COLOR UR: YELLOW
GLUCOSE UR STRIP.AUTO-MCNC: NORMAL MG/DL
HYALINE CASTS #/AREA URNS AUTO: ABNORMAL /LPF
KETONES UR STRIP.AUTO-MCNC: NEGATIVE MG/DL
LEUKOCYTE ESTERASE UR QL STRIP.AUTO: NEGATIVE
MUCOUS THREADS #/AREA URNS AUTO: ABNORMAL /LPF
NITRITE UR QL STRIP.AUTO: NEGATIVE
PH UR STRIP.AUTO: 6.5 [PH]
PROT UR STRIP.AUTO-MCNC: ABNORMAL MG/DL
RBC # UR STRIP.AUTO: NEGATIVE /UL
RBC #/AREA URNS AUTO: ABNORMAL /HPF
SP GR UR STRIP.AUTO: 1.03
TRANS CELLS #/AREA UR COMP ASSIST: ABNORMAL /HPF
UROBILINOGEN UR STRIP.AUTO-MCNC: ABNORMAL MG/DL
WBC #/AREA URNS AUTO: ABNORMAL /HPF
WBC CLUMPS #/AREA URNS AUTO: ABNORMAL /HPF

## 2024-09-25 PROCEDURE — 87086 URINE CULTURE/COLONY COUNT: CPT | Mod: OUT,SAMLAB | Performed by: INTERNAL MEDICINE

## 2024-09-25 PROCEDURE — 81001 URINALYSIS AUTO W/SCOPE: CPT | Mod: OUT | Performed by: INTERNAL MEDICINE

## 2024-09-26 LAB — BACTERIA UR CULT: NO GROWTH

## 2024-10-22 ENCOUNTER — TELEPHONE (OUTPATIENT)
Dept: PRIMARY CARE | Facility: CLINIC | Age: 84
End: 2024-10-22
Payer: MEDICARE

## 2024-10-22 NOTE — TELEPHONE ENCOUNTER
CALLED AND SAID ADRIAN IS IN THE [facility] AND DON'T THINK SHE WILL BE COMING HOME, SHE IS NOT DOING TO GOOD, THANK YOU.

## 2024-11-10 DIAGNOSIS — F01.50 VASCULAR DEMENTIA WITHOUT BEHAVIORAL DISTURBANCE (MULTI): ICD-10-CM

## 2024-11-12 RX ORDER — MEMANTINE HYDROCHLORIDE 28 MG/1
28 CAPSULE, EXTENDED RELEASE ORAL DAILY
Qty: 90 CAPSULE | Refills: 0 | OUTPATIENT
Start: 2024-11-12

## 2024-11-18 ENCOUNTER — APPOINTMENT (OUTPATIENT)
Dept: PRIMARY CARE | Facility: CLINIC | Age: 84
End: 2024-11-18
Payer: MEDICARE